# Patient Record
Sex: FEMALE | Race: WHITE | NOT HISPANIC OR LATINO | Employment: FULL TIME | URBAN - NONMETROPOLITAN AREA
[De-identification: names, ages, dates, MRNs, and addresses within clinical notes are randomized per-mention and may not be internally consistent; named-entity substitution may affect disease eponyms.]

---

## 2017-01-04 ENCOUNTER — TELEPHONE (OUTPATIENT)
Dept: OBSTETRICS AND GYNECOLOGY | Facility: CLINIC | Age: 22
End: 2017-01-04

## 2017-01-04 NOTE — TELEPHONE ENCOUNTER
----- Message from MILLY Aponte sent at 12/30/2016 12:47 PM CST -----  Contact: [phone]  I saw her in May of this year and told her to get her TSH checked and we would restart her synthroid if needed until she could get a PCP but she did not have the labs done. She needs to establish a PCP for her thyroid issues.    ----- Message -----     From: Brigid Agudelo     Sent: 12/30/2016  11:17 AM       To: MILLY Aponte    PATIENT IS WANTING TO KNOW IF SHE NEEDS TO COME BACK IN FOR HER THYROID LEVELS...

## 2017-08-15 ENCOUNTER — HOSPITAL ENCOUNTER (EMERGENCY)
Facility: HOSPITAL | Age: 22
Discharge: HOME | End: 2017-08-15
Attending: EMERGENCY MEDICINE | Admitting: EMERGENCY MEDICINE

## 2017-08-15 VITALS
OXYGEN SATURATION: 98 % | WEIGHT: 125 LBS | HEART RATE: 74 BPM | BODY MASS INDEX: 19.62 KG/M2 | SYSTOLIC BLOOD PRESSURE: 109 MMHG | DIASTOLIC BLOOD PRESSURE: 70 MMHG | RESPIRATION RATE: 16 BRPM | TEMPERATURE: 97 F | HEIGHT: 67 IN

## 2017-08-15 DIAGNOSIS — N94.6 MENSTRUAL CRAMPS: Primary | ICD-10-CM

## 2017-08-15 DIAGNOSIS — N94.3 PREMENSTRUAL SYNDROME: ICD-10-CM

## 2017-08-15 LAB
B-HCG UR QL: NEGATIVE
BACTERIA UR QL AUTO: ABNORMAL /HPF
BILIRUB UR QL STRIP: NEGATIVE
CLARITY UR: CLEAR
COLOR UR: YELLOW
GLUCOSE UR STRIP-MCNC: NEGATIVE MG/DL
HGB UR QL STRIP.AUTO: ABNORMAL
HYALINE CASTS UR QL AUTO: ABNORMAL /LPF
KETONES UR QL STRIP: ABNORMAL
LEUKOCYTE ESTERASE UR QL STRIP.AUTO: NEGATIVE
MUCOUS THREADS URNS QL MICRO: ABNORMAL /HPF
NITRITE UR QL STRIP: NEGATIVE
PH UR STRIP.AUTO: 6 [PH] (ref 5–9)
PROT UR QL STRIP: NEGATIVE
RBC # UR: ABNORMAL /HPF
REF LAB TEST METHOD: ABNORMAL
SP GR UR STRIP: 1.02 (ref 1–1.03)
SQUAMOUS #/AREA URNS HPF: ABNORMAL /HPF
UROBILINOGEN UR QL STRIP: ABNORMAL
WBC UR QL AUTO: ABNORMAL /HPF

## 2017-08-15 PROCEDURE — 87086 URINE CULTURE/COLONY COUNT: CPT | Performed by: EMERGENCY MEDICINE

## 2017-08-15 PROCEDURE — 81025 URINE PREGNANCY TEST: CPT | Performed by: EMERGENCY MEDICINE

## 2017-08-15 PROCEDURE — 96372 THER/PROPH/DIAG INJ SC/IM: CPT

## 2017-08-15 PROCEDURE — 99284 EMERGENCY DEPT VISIT MOD MDM: CPT

## 2017-08-15 PROCEDURE — 81001 URINALYSIS AUTO W/SCOPE: CPT | Performed by: EMERGENCY MEDICINE

## 2017-08-15 PROCEDURE — 81003 URINALYSIS AUTO W/O SCOPE: CPT | Performed by: EMERGENCY MEDICINE

## 2017-08-15 PROCEDURE — 25010000002 KETOROLAC TROMETHAMINE PER 15 MG: Performed by: EMERGENCY MEDICINE

## 2017-08-15 RX ORDER — KETOROLAC TROMETHAMINE 15 MG/ML
60 INJECTION, SOLUTION INTRAMUSCULAR; INTRAVENOUS ONCE
Status: DISCONTINUED | OUTPATIENT
Start: 2017-08-15 | End: 2017-08-15

## 2017-08-15 RX ORDER — KETOROLAC TROMETHAMINE 30 MG/ML
60 INJECTION, SOLUTION INTRAMUSCULAR; INTRAVENOUS ONCE
Status: COMPLETED | OUTPATIENT
Start: 2017-08-15 | End: 2017-08-15

## 2017-08-15 RX ADMIN — KETOROLAC TROMETHAMINE 60 MG: 60 INJECTION, SOLUTION INTRAMUSCULAR at 21:45

## 2017-08-16 ENCOUNTER — TELEPHONE (OUTPATIENT)
Dept: FAMILY MEDICINE CLINIC | Facility: CLINIC | Age: 22
End: 2017-08-16

## 2017-08-16 NOTE — ED PROVIDER NOTES
Subjective   HPI Comments: 22 years old female presented in the ER for evaluation of premenstrual and menstrual cramps.  She has been getting them for 2-3 years on a constant basis.  Patient reports she has sensation of bloating/fullness/nausea just before the start of cycle and initial couple of days or very heavy and painful.  They're progressively getting worse.  She is on her cycle for the last 2 days.  She woke up this morning with lower abdominal cramps.  It lasts for 30 minutes to an hour and improve on its own.  She has been taking over-the-counter pain medication with momentarily dropleaf.  She is not seen any provider for her ongoing symptoms.  She denies any nausea at present.  Her pain is better now.  She denies any diarrhea.  She has no fever or chills reported.  Patient reports her pain 2 out of 10.  Her cycles are pretty much regular.    Patient is a 22 y.o. female presenting with cramps.   History provided by:  Patient   used: No    Abdominal Cramping   Pain location: lower abdomen   Pain quality: cramping    Pain radiates to:  Does not radiate  Pain severity:  Moderate  Onset quality:  Gradual  Duration: almost 2-3 years   Timing:  Intermittent  Progression:  Waxing and waning  Chronicity:  Chronic  Context comment:  Premenstrual and menstrual  Relieved by:  OTC medications  Worsened by:  Nothing  Associated symptoms: no anorexia, no chest pain, no chills, no constipation, no cough, no diarrhea, no dysuria, no fever, no hematuria, no nausea, no shortness of breath and no vomiting        Review of Systems   Constitutional: Negative for activity change, chills and fever.   HENT: Negative for congestion, facial swelling, rhinorrhea and sinus pressure.    Eyes: Negative for photophobia and visual disturbance.   Respiratory: Negative for cough, chest tightness, shortness of breath and wheezing.    Cardiovascular: Negative for chest pain.   Gastrointestinal: Negative for abdominal  distention, abdominal pain, anorexia, constipation, diarrhea, nausea and vomiting.   Endocrine: Negative for polyuria.   Genitourinary: Negative for dysuria, flank pain and hematuria.   Musculoskeletal: Negative for back pain, joint swelling and neck pain.   Skin: Negative for rash.   Neurological: Negative for seizures, numbness and headaches.   Hematological: Negative for adenopathy.   Psychiatric/Behavioral: Negative for agitation.       Past Medical History:   Diagnosis Date   • Disease of thyroid gland    • Kidney stone    • Ovarian cyst        No Known Allergies    Past Surgical History:   Procedure Laterality Date   • ADENOIDECTOMY     • KIDNEY STONE SURGERY     • TONSILLECTOMY         History reviewed. No pertinent family history.    Social History     Social History   • Marital status: Single     Spouse name: N/A   • Number of children: N/A   • Years of education: N/A     Social History Main Topics   • Smoking status: Never Smoker   • Smokeless tobacco: None   • Alcohol use No   • Drug use: No   • Sexual activity: No     Other Topics Concern   • None     Social History Narrative   • None           Objective   Physical Exam   Constitutional: She is oriented to person, place, and time. She appears well-developed and well-nourished. No distress.   HENT:   Head: Normocephalic and atraumatic.   Eyes: Conjunctivae are normal.   Neck: Normal range of motion. Neck supple.   Cardiovascular: Normal rate, regular rhythm and normal heart sounds.    Pulmonary/Chest: Effort normal and breath sounds normal. No respiratory distress. She has no wheezes.   Abdominal: Soft. Bowel sounds are normal. There is no tenderness (minimal discomfort in the lsuprapubic area ). There is no rebound and no guarding.   Musculoskeletal: Normal range of motion. She exhibits no edema or deformity.   Neurological: She is alert and oriented to person, place, and time.   Skin: Skin is warm and dry. No rash noted.   Psychiatric: Her mood appears  anxious.   Nursing note and vitals reviewed.      Procedures         ED Course  ED Course   Comment By Time   I believe she has a urinary tract infection.  It safe contaminated somber.  She has clear picture of premenstrual syndrome with some menstrual cramps.  I have advised her to take Tylenol/ibuprofen as needed and follow-up with primary care to be on some medication for daily use. Hima Martinez MD 08/15 2955          .      Labs Reviewed   URINALYSIS W/ CULTURE IF INDICATED - Abnormal; Notable for the following:        Result Value    Ketones, UA Trace (*)     Blood, UA Trace (*)     All other components within normal limits   URINALYSIS, MICROSCOPIC ONLY - Abnormal; Notable for the following:     RBC, UA 0-2 (*)     WBC, UA 6-12 (*)     Bacteria, UA Trace (*)     Squamous Epithelial Cells, UA 6-12 (*)     Mucus, UA Large/3+ (*)     All other components within normal limits   PREGNANCY, URINE - Normal   URINE CULTURE       No results found.        MDM    Final diagnoses:   Menstrual cramps   Premenstrual syndrome            Hima Martinez MD  08/16/17 2906

## 2017-08-16 NOTE — ED TRIAGE NOTES
"Patient presents to ED with c/o lower abdominal pain, reportedly from her menstrual cycle. States her cramping is severe every month during this time but  this occurrence is the worst pain she has experienced thus far with her cycle. Claims to have a hx of ovarian cyst, diagnosed by vaginal ultrasound about 2 years ago. Pt took \"Pamprin\" for her pain @ 0800am this morning around the onset of the pain, with no relief.   "

## 2017-08-16 NOTE — DISCHARGE INSTRUCTIONS
Take Tylenol/Pamprin as needed.  Follow-up with primary care for further evaluation and to be on prophylactic medications.   none

## 2017-08-17 LAB — BACTERIA SPEC AEROBE CULT: NORMAL

## 2017-08-24 ENCOUNTER — OFFICE VISIT (OUTPATIENT)
Dept: FAMILY MEDICINE CLINIC | Facility: CLINIC | Age: 22
End: 2017-08-24

## 2017-08-24 ENCOUNTER — APPOINTMENT (OUTPATIENT)
Dept: LAB | Facility: HOSPITAL | Age: 22
End: 2017-08-24

## 2017-08-24 VITALS
WEIGHT: 132 LBS | SYSTOLIC BLOOD PRESSURE: 90 MMHG | DIASTOLIC BLOOD PRESSURE: 68 MMHG | BODY MASS INDEX: 20.72 KG/M2 | HEIGHT: 67 IN

## 2017-08-24 DIAGNOSIS — E07.89 THYROID FULLNESS: Primary | ICD-10-CM

## 2017-08-24 DIAGNOSIS — E03.9 ACQUIRED HYPOTHYROIDISM: ICD-10-CM

## 2017-08-24 DIAGNOSIS — R10.2 PELVIC PAIN: ICD-10-CM

## 2017-08-24 DIAGNOSIS — Z00.00 GENERAL MEDICAL EXAM: ICD-10-CM

## 2017-08-24 LAB
25(OH)D3 SERPL-MCNC: 32 NG/ML (ref 30–100)
ALBUMIN SERPL-MCNC: 4.6 G/DL (ref 3.4–4.8)
ALBUMIN/GLOB SERPL: 1.3 G/DL (ref 1.1–1.8)
ALP SERPL-CCNC: 81 U/L (ref 38–126)
ALT SERPL W P-5'-P-CCNC: 35 U/L (ref 9–52)
ANION GAP SERPL CALCULATED.3IONS-SCNC: 12 MMOL/L (ref 5–15)
AST SERPL-CCNC: 41 U/L (ref 14–36)
BASOPHILS # BLD AUTO: 0.01 10*3/MM3 (ref 0–0.2)
BASOPHILS NFR BLD AUTO: 0.1 % (ref 0–2)
BILIRUB SERPL-MCNC: 0.8 MG/DL (ref 0.2–1.3)
BUN BLD-MCNC: 10 MG/DL (ref 7–21)
BUN/CREAT SERPL: 10.2 (ref 7–25)
CALCIUM SPEC-SCNC: 9.9 MG/DL (ref 8.4–10.2)
CHLORIDE SERPL-SCNC: 101 MMOL/L (ref 95–110)
CO2 SERPL-SCNC: 25 MMOL/L (ref 22–31)
CREAT BLD-MCNC: 0.98 MG/DL (ref 0.5–1)
DEPRECATED RDW RBC AUTO: 40.4 FL (ref 36.4–46.3)
EOSINOPHIL # BLD AUTO: 0.23 10*3/MM3 (ref 0–0.7)
EOSINOPHIL NFR BLD AUTO: 3.3 % (ref 0–7)
ERYTHROCYTE [DISTWIDTH] IN BLOOD BY AUTOMATED COUNT: 13.4 % (ref 11.5–14.5)
GFR SERPL CREATININE-BSD FRML MDRD: 70 ML/MIN/1.73 (ref 60–165)
GLOBULIN UR ELPH-MCNC: 3.5 GM/DL (ref 2.3–3.5)
GLUCOSE BLD-MCNC: 89 MG/DL (ref 60–100)
HCT VFR BLD AUTO: 41.6 % (ref 35–45)
HGB BLD-MCNC: 14.2 G/DL (ref 12–15.5)
IMM GRANULOCYTES # BLD: 0.02 10*3/MM3 (ref 0–0.02)
IMM GRANULOCYTES NFR BLD: 0.3 % (ref 0–0.5)
IRON 24H UR-MRATE: 67 MCG/DL (ref 37–170)
LYMPHOCYTES # BLD AUTO: 1.27 10*3/MM3 (ref 0.6–4.2)
LYMPHOCYTES NFR BLD AUTO: 18 % (ref 10–50)
MCH RBC QN AUTO: 28 PG (ref 26.5–34)
MCHC RBC AUTO-ENTMCNC: 34.1 G/DL (ref 31.4–36)
MCV RBC AUTO: 81.9 FL (ref 80–98)
MONOCYTES # BLD AUTO: 0.42 10*3/MM3 (ref 0–0.9)
MONOCYTES NFR BLD AUTO: 5.9 % (ref 0–12)
NEUTROPHILS # BLD AUTO: 5.12 10*3/MM3 (ref 2–8.6)
NEUTROPHILS NFR BLD AUTO: 72.4 % (ref 37–80)
PLATELET # BLD AUTO: 216 10*3/MM3 (ref 150–450)
PMV BLD AUTO: 9.7 FL (ref 8–12)
POTASSIUM BLD-SCNC: 4.5 MMOL/L (ref 3.5–5.1)
PROT SERPL-MCNC: 8.1 G/DL (ref 6.3–8.6)
RBC # BLD AUTO: 5.08 10*6/MM3 (ref 3.77–5.16)
SODIUM BLD-SCNC: 138 MMOL/L (ref 137–145)
T3 SERPL-MCNC: 130 NG/DL (ref 97–169)
T4 SERPL-MCNC: 8.46 MCG/DL (ref 5.5–11)
TSH SERPL DL<=0.05 MIU/L-ACNC: 5.42 MIU/ML (ref 0.46–4.68)
VIT B12 BLD-MCNC: 605 PG/ML (ref 239–931)
WBC NRBC COR # BLD: 7.07 10*3/MM3 (ref 3.2–9.8)

## 2017-08-24 PROCEDURE — 82607 VITAMIN B-12: CPT | Performed by: NURSE PRACTITIONER

## 2017-08-24 PROCEDURE — 85025 COMPLETE CBC W/AUTO DIFF WBC: CPT | Performed by: NURSE PRACTITIONER

## 2017-08-24 PROCEDURE — 99214 OFFICE O/P EST MOD 30 MIN: CPT | Performed by: NURSE PRACTITIONER

## 2017-08-24 PROCEDURE — 82306 VITAMIN D 25 HYDROXY: CPT | Performed by: NURSE PRACTITIONER

## 2017-08-24 PROCEDURE — 84480 ASSAY TRIIODOTHYRONINE (T3): CPT | Performed by: NURSE PRACTITIONER

## 2017-08-24 PROCEDURE — 84443 ASSAY THYROID STIM HORMONE: CPT | Performed by: NURSE PRACTITIONER

## 2017-08-24 PROCEDURE — 80053 COMPREHEN METABOLIC PANEL: CPT | Performed by: NURSE PRACTITIONER

## 2017-08-24 PROCEDURE — 86376 MICROSOMAL ANTIBODY EACH: CPT | Performed by: NURSE PRACTITIONER

## 2017-08-24 PROCEDURE — 83540 ASSAY OF IRON: CPT | Performed by: NURSE PRACTITIONER

## 2017-08-24 PROCEDURE — 84436 ASSAY OF TOTAL THYROXINE: CPT | Performed by: NURSE PRACTITIONER

## 2017-08-24 PROCEDURE — 36415 COLL VENOUS BLD VENIPUNCTURE: CPT | Performed by: NURSE PRACTITIONER

## 2017-08-24 NOTE — PROGRESS NOTES
Chief Complaint   Patient presents with   • Establish Care   • Menstrual Problem     bad cramping   • Hypothyroidism     Subjective   Lala Padilla is a 22 y.o. female.     Menstrual Problem   This is a chronic problem. The current episode started more than 1 year ago. The problem occurs constantly. The problem has been gradually worsening. Associated symptoms include abdominal pain, arthralgias, congestion, fatigue and a sore throat. Pertinent negatives include no anorexia, change in bowel habit, chest pain, chills, coughing, diaphoresis, fever, headaches, joint swelling, myalgias, nausea, neck pain, numbness, rash, swollen glands, urinary symptoms, vertigo, visual change, vomiting or weakness. Exacerbated by: menstrual cycle  Treatments tried: 2 different ocp-did not help has been to the er several times with no relief  The treatment provided no relief.   Hypothyroidism   This is a recurrent problem. The current episode started 1 to 4 weeks ago. The problem occurs constantly. The problem has been gradually worsening. Associated symptoms include abdominal pain, arthralgias, congestion, fatigue and a sore throat. Pertinent negatives include no anorexia, change in bowel habit, chest pain, chills, coughing, diaphoresis, fever, headaches, joint swelling, myalgias, nausea, neck pain, numbness, rash, swollen glands, urinary symptoms, vertigo, visual change, vomiting or weakness. Nothing aggravates the symptoms. She has tried nothing for the symptoms.        The following portions of the patient's history were reviewed and updated as appropriate: allergies, current medications, past social history and problem list.    Review of Systems   Constitutional: Positive for activity change, appetite change and fatigue. Negative for chills, diaphoresis and fever.   HENT: Positive for congestion, postnasal drip and sore throat.    Eyes: Negative.  Negative for photophobia, pain, discharge, redness, itching and visual  "disturbance.   Respiratory: Negative.  Negative for apnea, cough, choking, chest tightness, shortness of breath, wheezing and stridor.    Cardiovascular: Negative.  Negative for chest pain.   Gastrointestinal: Positive for abdominal pain. Negative for anorexia, change in bowel habit, nausea and vomiting.   Endocrine: Negative.    Genitourinary: Positive for difficulty urinating, menstrual problem, pelvic pain and vaginal pain. Negative for decreased urine volume, dyspareunia, dysuria, enuresis, flank pain, frequency, genital sores, hematuria, urgency, vaginal bleeding and vaginal discharge.   Musculoskeletal: Positive for arthralgias. Negative for back pain, gait problem, joint swelling, myalgias and neck pain.   Skin: Negative for rash.   Allergic/Immunologic: Negative.  Negative for environmental allergies, food allergies and immunocompromised state.   Neurological: Negative.  Negative for dizziness, vertigo, tremors, seizures, syncope, facial asymmetry, speech difficulty, weakness, light-headedness, numbness and headaches.   Hematological: Negative.  Negative for adenopathy. Does not bruise/bleed easily.   Psychiatric/Behavioral: Negative.    All other systems reviewed and are negative.      Objective   BP 90/68  Ht 67\" (170.2 cm)  Wt 132 lb (59.9 kg)  BMI 20.67 kg/m2  Physical Exam   Constitutional: She is oriented to person, place, and time. She appears well-developed and well-nourished. No distress.   HENT:   Head: Normocephalic and atraumatic.   Mouth/Throat: No oropharyngeal exudate.   Thyroid fullness-right worse than left-non tender mother recently treated for thyroid cancer    Eyes: EOM are normal. Pupils are equal, round, and reactive to light. Right eye exhibits no discharge. Left eye exhibits no discharge. No scleral icterus.   Neck: Normal range of motion. No JVD present. No tracheal deviation present. No thyromegaly present.   Cardiovascular: Normal rate.  Exam reveals no gallop and no friction " rub.    No murmur heard.  Pulmonary/Chest: Effort normal and breath sounds normal. No stridor. No respiratory distress. She has no wheezes. She has no rales. She exhibits no tenderness.   Abdominal: Soft. Bowel sounds are normal. She exhibits no distension and no mass. There is tenderness. There is no rebound and no guarding. No hernia.   Musculoskeletal: Normal range of motion. She exhibits no edema, tenderness or deformity.   Lymphadenopathy:     She has no cervical adenopathy.   Neurological: She is alert and oriented to person, place, and time. She has normal reflexes. She displays normal reflexes. No cranial nerve deficit. She exhibits normal muscle tone. Coordination normal.   Skin: Skin is warm and dry. She is not diaphoretic.   Nursing note and vitals reviewed.      Assessment/Plan   Problem List Items Addressed This Visit        Endocrine    Thyroid fullness - Primary    Relevant Orders    TSH    T3    T4    Comprehensive Metabolic Panel    CBC & Differential    Iron    Vitamin B12    Vitamin D 25 Hydroxy    Thyroid Peroxidase Antibody    US Thyroid    CBC Auto Differential    Acquired hypothyroidism    Relevant Orders    TSH    T3    T4    Comprehensive Metabolic Panel    CBC & Differential    Iron    Vitamin B12    Vitamin D 25 Hydroxy    Thyroid Peroxidase Antibody    US Thyroid    CBC Auto Differential       Nervous and Auditory    Pelvic pain    Relevant Orders    Ambulatory Referral to Gynecology       Other    General medical exam    Relevant Orders    TSH    T3    T4    Comprehensive Metabolic Panel    CBC & Differential    Iron    Vitamin B12    Vitamin D 25 Hydroxy    Thyroid Peroxidase Antibody    US Thyroid    CBC Auto Differential         No orders of the defined types were placed in this encounter.    Follow up with gyn as scheduled due to heavy irregular menstrual cycles, us of thyroid due to mother's family hx and swelling of thyroid noted today

## 2017-08-25 DIAGNOSIS — E03.9 ACQUIRED HYPOTHYROIDISM: Primary | ICD-10-CM

## 2017-08-26 LAB — THYROPEROXIDASE AB SERPL-ACNC: 194 IU/ML (ref 0–34)

## 2017-08-28 DIAGNOSIS — E03.9 ACQUIRED HYPOTHYROIDISM: Primary | ICD-10-CM

## 2017-08-28 RX ORDER — LEVOTHYROXINE SODIUM 0.05 MG/1
50 TABLET ORAL DAILY
Qty: 30 TABLET | Refills: 11 | Status: SHIPPED | OUTPATIENT
Start: 2017-08-28 | End: 2018-03-02 | Stop reason: DRUGHIGH

## 2017-09-28 ENCOUNTER — OFFICE VISIT (OUTPATIENT)
Dept: OBSTETRICS AND GYNECOLOGY | Facility: CLINIC | Age: 22
End: 2017-09-28

## 2017-09-28 VITALS
WEIGHT: 132 LBS | DIASTOLIC BLOOD PRESSURE: 60 MMHG | SYSTOLIC BLOOD PRESSURE: 98 MMHG | HEIGHT: 67 IN | BODY MASS INDEX: 20.72 KG/M2

## 2017-09-28 DIAGNOSIS — Z01.419 ENCOUNTER FOR ANNUAL ROUTINE GYNECOLOGICAL EXAMINATION: Primary | ICD-10-CM

## 2017-09-28 DIAGNOSIS — N94.6 DYSMENORRHEA: Chronic | ICD-10-CM

## 2017-09-28 DIAGNOSIS — R10.2 PELVIC PAIN: ICD-10-CM

## 2017-09-28 PROCEDURE — 99385 PREV VISIT NEW AGE 18-39: CPT | Performed by: OBSTETRICS & GYNECOLOGY

## 2017-09-28 PROCEDURE — 88142 CYTOPATH C/V THIN LAYER: CPT | Performed by: OBSTETRICS & GYNECOLOGY

## 2017-09-29 NOTE — PROGRESS NOTES
Lala Padilla is a 22 y.o. y/o female     Chief Complaint: Annual GYN exam and Pap smear and pelvic pain and dysmenorrhea    HPI: 22-year-old G0 who states that she is not sexually active presents for annual GYN exam and Pap smear.  After exam and Pap smear she stated that her primary care provider sent her to me because she may have endometriosis.    We discussed endometriosis and how it is definitively diagnosed and some of the treatment options for it.  I suggested that she try a low-dose birth control pill, but she wants to think about that.    She is a nonsmoker, single, and unemployed.  She has hypothyroidism for which she takes 50 µg of Synthroid a day.  She states that her mother had thyroid cancer. Past surgical history include tonsillectomy in 2008 and surgery for kidney stones in 2014.    Review of Systems   Constitutional: Positive for fatigue. Negative for activity change, appetite change, chills, diaphoresis, fever and unexpected weight change.   Gastrointestinal: Positive for diarrhea. Negative for abdominal pain, constipation and nausea.   Genitourinary: Positive for menstrual problem and pelvic pain. Negative for difficulty urinating, dyspareunia, dysuria, urgency, vaginal bleeding, vaginal discharge and vaginal pain.   Neurological: Positive for headaches.   Psychiatric/Behavioral: Positive for dysphoric mood and sleep disturbance. The patient is nervous/anxious.    All other systems reviewed and are negative.     Breast ROS: negative    The following portions of the patient's history were reviewed and updated as appropriate: allergies, current medications, past family history, past medical history, past social history, past surgical history and problem list.    No Known Allergies       Current Outpatient Prescriptions:   •  levothyroxine (SYNTHROID) 50 MCG tablet, Take 1 tablet by mouth Daily., Disp: 30 tablet, Rfl: 11  •  Naproxen Sodium (PAMPRIN ALL DAY RELIEF MAX ST PO), Take  by  mouth., Disp: , Rfl:      The patient has a family history of   Family History   Problem Relation Age of Onset   • Cancer Other    • Diabetes Other    • Heart disease Other    • Hypertension Other    • Thyroid disease Other         Past Medical History:   Diagnosis Date   • Acute tonsillitis    • Bronchitis    • Cellulitis     History of abscess, near left axilla      • Contusion of knee    • Depressive disorder    • Diarrhea    • Disease of thyroid gland    • Dysmenorrhea 9/28/2017   • Dysuria    • Encounter for gynecological examination (general) (routine) without abnormal findings    • Encounter for removal of sutures    • Encounter for surveillance of contraceptive pills    • Folliculitis    • Fracture of phalanx of hand     Left thumb      • Gastroenteritis    • Generalized anxiety disorder    • Hypothyroidism, unspecified    • Irregular periods    • Kidney stone    • Knee pain    • Lipoma    • Ovarian cyst    • Pain in joint, ankle and foot    • Pain in wrist    • Panic disorder    • Ruptured cyst of ovary    • Sports physical    • Sprain of ankle    • Strain of neck muscle    • Tick bite    • Upper respiratory infection         OB History     No data available           Social History     Social History   • Marital status: Single     Spouse name: N/A   • Number of children: N/A   • Years of education: N/A     Occupational History   • Not on file.     Social History Main Topics   • Smoking status: Never Smoker   • Smokeless tobacco: Never Used   • Alcohol use No   • Drug use: No   • Sexual activity: Not Currently     Birth control/ protection: None     Other Topics Concern   • Not on file     Social History Narrative        Past Surgical History:   Procedure Laterality Date   • ADENOIDECTOMY     • HYSTERECTOMY     • INCISION AND DRAINAGE ABSCESS  02/04/2009    Left axillary abscess   • INJECTION OF MEDICATION  05/31/2014    Toradol(1)   • INJECTION OF MEDICATION  05/31/2014    Zofran(1)   • KIDNEY STONE  "SURGERY     • TONSILLECTOMY          Patient Active Problem List   Diagnosis   • Thyroid fullness   • General medical exam   • Acquired hypothyroidism   • Pelvic pain   • Dysmenorrhea        Documented Vitals    09/28/17 1440   BP: 98/60   Weight: 132 lb (59.9 kg)   Height: 67\" (170.2 cm)   PainSc: 0-No pain       Physical Exam   Constitutional: She is oriented to person, place, and time. She appears well-developed and well-nourished. No distress.   132 pounds with BMI 20.7   HENT:   Head: Normocephalic and atraumatic.   Eyes: Conjunctivae and EOM are normal. Pupils are equal, round, and reactive to light.   Neck: Normal range of motion. Neck supple. No JVD present. No tracheal deviation present. No thyromegaly present.   Cardiovascular: Normal rate, regular rhythm, normal heart sounds and intact distal pulses.  Exam reveals no gallop and no friction rub.    No murmur heard.  Pulmonary/Chest: Effort normal and breath sounds normal. No stridor. No respiratory distress. She has no wheezes. She has no rales. She exhibits no tenderness. Right breast exhibits no inverted nipple, no mass, no nipple discharge, no skin change and no tenderness. Left breast exhibits no inverted nipple, no mass, no nipple discharge, no skin change and no tenderness. Breasts are symmetrical. There is no breast swelling.   Abdominal: Soft. Bowel sounds are normal. She exhibits no distension and no mass. There is no tenderness. There is no rebound and no guarding. No hernia. Hernia confirmed negative in the right inguinal area and confirmed negative in the left inguinal area.   Genitourinary: Vagina normal and uterus normal. Rectal exam shows no external hemorrhoid. No breast tenderness, discharge or bleeding. No labial fusion. There is no rash, tenderness, lesion or injury on the right labia. There is no rash, tenderness, lesion or injury on the left labia. Uterus is not deviated, not enlarged, not fixed and not tender. Cervix exhibits no " motion tenderness, no discharge and no friability. Right adnexum displays no mass, no tenderness and no fullness. Left adnexum displays no mass, no tenderness and no fullness. No vaginal discharge found.   Genitourinary Comments: Pap smear of the cervix performed.  Urethral meatus without erythema or prolapse.   Musculoskeletal: Normal range of motion. She exhibits no edema, tenderness or deformity.   Lymphadenopathy:     She has no cervical adenopathy.        Right: No inguinal adenopathy present.        Left: No inguinal adenopathy present.   Neurological: She is alert and oriented to person, place, and time. She has normal reflexes. She displays normal reflexes. No cranial nerve deficit. She exhibits normal muscle tone. Coordination normal.   Skin: Skin is warm and dry. No rash noted. She is not diaphoretic. No erythema. No pallor.   Psychiatric: She has a normal mood and affect. Her behavior is normal. Judgment and thought content normal.   Nursing note and vitals reviewed.       Assessment        Diagnosis Plan   1. Encounter for annual routine gynecological examination  Liquid-based Pap Smear, Screening - ThinPrep Vial, Cervix   2. Pelvic pain     3. Dysmenorrhea           Plan      1. Consider birth control pills.    2. Encouraged in diet and exercise.  3. Handouts on depression, hot flashes, exercise, and vitamin use.   4. Follow-up in 3 months.  Follow-up sooner as needed.            This document has been electronically signed by Polo Keenan MD on September 28, 2017 8:54 PM

## 2017-10-02 LAB
LAB AP CASE REPORT: NORMAL
LAB AP GYN ADDITIONAL INFORMATION: NORMAL
Lab: NORMAL
PATH INTERP SPEC-IMP: NORMAL
STAT OF ADQ CVX/VAG CYTO-IMP: NORMAL

## 2017-11-15 DIAGNOSIS — E03.9 ACQUIRED HYPOTHYROIDISM: Primary | ICD-10-CM

## 2017-11-17 ENCOUNTER — APPOINTMENT (OUTPATIENT)
Dept: LAB | Facility: HOSPITAL | Age: 22
End: 2017-11-17

## 2017-11-17 LAB
T3 SERPL-MCNC: 132 NG/DL (ref 97–169)
T4 FREE SERPL-MCNC: 1.02 NG/DL (ref 0.78–2.19)
TSH SERPL DL<=0.05 MIU/L-ACNC: 2.02 MIU/ML (ref 0.46–4.68)

## 2017-11-17 PROCEDURE — 36415 COLL VENOUS BLD VENIPUNCTURE: CPT | Performed by: NURSE PRACTITIONER

## 2017-11-17 PROCEDURE — 84480 ASSAY TRIIODOTHYRONINE (T3): CPT | Performed by: NURSE PRACTITIONER

## 2017-11-17 PROCEDURE — 84439 ASSAY OF FREE THYROXINE: CPT | Performed by: NURSE PRACTITIONER

## 2017-11-17 PROCEDURE — 84443 ASSAY THYROID STIM HORMONE: CPT | Performed by: NURSE PRACTITIONER

## 2017-12-28 ENCOUNTER — OFFICE VISIT (OUTPATIENT)
Dept: OBSTETRICS AND GYNECOLOGY | Facility: CLINIC | Age: 22
End: 2017-12-28

## 2017-12-28 VITALS
BODY MASS INDEX: 21.6 KG/M2 | HEIGHT: 67 IN | WEIGHT: 137.6 LBS | DIASTOLIC BLOOD PRESSURE: 70 MMHG | SYSTOLIC BLOOD PRESSURE: 99 MMHG

## 2017-12-28 DIAGNOSIS — N94.6 DYSMENORRHEA: Primary | Chronic | ICD-10-CM

## 2017-12-28 DIAGNOSIS — E03.9 ACQUIRED HYPOTHYROIDISM: ICD-10-CM

## 2017-12-28 PROCEDURE — 99214 OFFICE O/P EST MOD 30 MIN: CPT | Performed by: OBSTETRICS & GYNECOLOGY

## 2017-12-28 RX ORDER — NORETHINDRONE ACETATE AND ETHINYL ESTRADIOL 1; .02 MG/1; MG/1
1 TABLET ORAL DAILY
Qty: 21 TABLET | Refills: 12 | Status: SHIPPED | OUTPATIENT
Start: 2017-12-28 | End: 2018-02-08 | Stop reason: SDUPTHER

## 2018-01-26 ENCOUNTER — OFFICE VISIT (OUTPATIENT)
Dept: FAMILY MEDICINE CLINIC | Facility: CLINIC | Age: 23
End: 2018-01-26

## 2018-01-26 VITALS
HEIGHT: 67 IN | TEMPERATURE: 98.2 F | DIASTOLIC BLOOD PRESSURE: 64 MMHG | WEIGHT: 138.9 LBS | SYSTOLIC BLOOD PRESSURE: 110 MMHG | BODY MASS INDEX: 21.8 KG/M2 | OXYGEN SATURATION: 98 % | HEART RATE: 109 BPM

## 2018-01-26 DIAGNOSIS — J01.00 ACUTE NON-RECURRENT MAXILLARY SINUSITIS: Primary | ICD-10-CM

## 2018-01-26 PROCEDURE — 99213 OFFICE O/P EST LOW 20 MIN: CPT | Performed by: NURSE PRACTITIONER

## 2018-01-26 RX ORDER — FLUTICASONE PROPIONATE 50 MCG
2 SPRAY, SUSPENSION (ML) NASAL DAILY
Qty: 1 BOTTLE | Refills: 0 | Status: SHIPPED | OUTPATIENT
Start: 2018-01-26 | End: 2018-03-01

## 2018-01-26 RX ORDER — AMOXICILLIN AND CLAVULANATE POTASSIUM 875; 125 MG/1; MG/1
1 TABLET, FILM COATED ORAL EVERY 12 HOURS SCHEDULED
Qty: 20 TABLET | Refills: 0 | Status: SHIPPED | OUTPATIENT
Start: 2018-01-26 | End: 2018-03-01

## 2018-01-26 NOTE — PROGRESS NOTES
Melissa Padilla is a 23 y.o. female.  Has had sinus issues for years but two days ago she began having blood in her drainage from her nose and the back of her throat.  Yesterday she was brushing her teeth and she kind of gagged and a lot of blood came up in her mucous.  Has been having a lot of pressure on her forehead and around her eyes.    Sinus Problem   This is a new problem. The current episode started in the past 7 days. The problem has been gradually worsening since onset. There has been no fever. Associated symptoms include coughing and sinus pressure. Past treatments include nothing. The treatment provided no relief.        The following portions of the patient's history were reviewed and updated as appropriate: allergies, current medications, past family history, past medical history, past social history, past surgical history and problem list.    Review of Systems   Constitutional: Negative.    HENT: Positive for sinus pressure.    Eyes: Negative.    Respiratory: Positive for cough.    Gastrointestinal: Negative.    Genitourinary: Negative.    Musculoskeletal: Negative.    Skin: Negative.        Objective   Physical Exam   Constitutional: She is oriented to person, place, and time. She appears well-developed and well-nourished.   HENT:   Head: Normocephalic.   Right Ear: External ear normal. Tympanic membrane is bulging.   Left Ear: External ear normal. Tympanic membrane is bulging.   Nose: Mucosal edema present. Right sinus exhibits maxillary sinus tenderness. Left sinus exhibits maxillary sinus tenderness.   Eyes: Pupils are equal, round, and reactive to light.   Dark circles to bilateral orbital areas   Neck: Normal range of motion. Neck supple.   Cardiovascular: Normal rate, regular rhythm and normal heart sounds.    Pulmonary/Chest: Effort normal and breath sounds normal.   Abdominal: Soft. Bowel sounds are normal.   Musculoskeletal: Normal range of motion.   Neurological:  She is alert and oriented to person, place, and time.   Skin: Skin is warm and dry.   Psychiatric: She has a normal mood and affect. Her behavior is normal. Judgment and thought content normal.   Nursing note and vitals reviewed.      Assessment/Plan   Problems Addressed this Visit     None      Visit Diagnoses     Acute non-recurrent maxillary sinusitis    -  Primary    Relevant Medications    amoxicillin-clavulanate (AUGMENTIN) 875-125 MG per tablet    fluticasone (FLONASE) 50 MCG/ACT nasal spray        Continue on current medications as previously prescribed  Schedule follow-up appointment with primary care provider or may establish primary care with this office    1.  Acute nonrecurrent maxillary sinusitis:  Begin prescription for Augmentin as prescribed to be taken 1 by mouth twice a day ×10 days  Educated patient on importance of completing full dose of antibiotic therapy again Flonase as prescribed to be one spray to each nostril twice a day  Schedule follow-up appointment with this office in 3-5 days if no improvement or worsening of symptoms        This document has been electronically signed by MILLY Desai on January 29, 2018 12:51 PM

## 2018-02-08 RX ORDER — NORETHINDRONE ACETATE AND ETHINYL ESTRADIOL 1; .02 MG/1; MG/1
1 TABLET ORAL DAILY
Qty: 21 TABLET | Refills: 12 | Status: SHIPPED | OUTPATIENT
Start: 2018-02-08 | End: 2019-02-05 | Stop reason: SDUPTHER

## 2018-03-01 ENCOUNTER — OFFICE VISIT (OUTPATIENT)
Dept: FAMILY MEDICINE CLINIC | Facility: CLINIC | Age: 23
End: 2018-03-01

## 2018-03-01 ENCOUNTER — APPOINTMENT (OUTPATIENT)
Dept: LAB | Facility: HOSPITAL | Age: 23
End: 2018-03-01

## 2018-03-01 VITALS
HEIGHT: 67 IN | WEIGHT: 141 LBS | DIASTOLIC BLOOD PRESSURE: 78 MMHG | BODY MASS INDEX: 22.13 KG/M2 | SYSTOLIC BLOOD PRESSURE: 118 MMHG

## 2018-03-01 DIAGNOSIS — E03.9 ACQUIRED HYPOTHYROIDISM: ICD-10-CM

## 2018-03-01 DIAGNOSIS — F41.9 ANXIETY: Primary | ICD-10-CM

## 2018-03-01 DIAGNOSIS — R53.81 MALAISE: ICD-10-CM

## 2018-03-01 LAB
25(OH)D3 SERPL-MCNC: <12.8 NG/ML (ref 30–100)
ALBUMIN SERPL-MCNC: 4.4 G/DL (ref 3.4–4.8)
ALBUMIN/GLOB SERPL: 1.3 G/DL (ref 1.1–1.8)
ALP SERPL-CCNC: 75 U/L (ref 38–126)
ALT SERPL W P-5'-P-CCNC: 28 U/L (ref 9–52)
ANION GAP SERPL CALCULATED.3IONS-SCNC: 13 MMOL/L (ref 5–15)
AST SERPL-CCNC: 29 U/L (ref 14–36)
BASOPHILS # BLD AUTO: 0.02 10*3/MM3 (ref 0–0.2)
BASOPHILS NFR BLD AUTO: 0.2 % (ref 0–2)
BILIRUB SERPL-MCNC: 0.4 MG/DL (ref 0.2–1.3)
BUN BLD-MCNC: 9 MG/DL (ref 7–21)
BUN/CREAT SERPL: 9 (ref 7–25)
CALCIUM SPEC-SCNC: 9.6 MG/DL (ref 8.4–10.2)
CHLORIDE SERPL-SCNC: 101 MMOL/L (ref 95–110)
CO2 SERPL-SCNC: 23 MMOL/L (ref 22–31)
CREAT BLD-MCNC: 1 MG/DL (ref 0.5–1)
DEPRECATED RDW RBC AUTO: 37.5 FL (ref 36.4–46.3)
EOSINOPHIL # BLD AUTO: 0.21 10*3/MM3 (ref 0–0.7)
EOSINOPHIL NFR BLD AUTO: 1.9 % (ref 0–7)
ERYTHROCYTE [DISTWIDTH] IN BLOOD BY AUTOMATED COUNT: 13 % (ref 11.5–14.5)
GFR SERPL CREATININE-BSD FRML MDRD: 68 ML/MIN/1.73 (ref 71–165)
GLOBULIN UR ELPH-MCNC: 3.5 GM/DL (ref 2.3–3.5)
GLUCOSE BLD-MCNC: 85 MG/DL (ref 60–100)
HCT VFR BLD AUTO: 40 % (ref 35–45)
HGB BLD-MCNC: 13.8 G/DL (ref 12–15.5)
IMM GRANULOCYTES # BLD: 0.02 10*3/MM3 (ref 0–0.02)
IMM GRANULOCYTES NFR BLD: 0.2 % (ref 0–0.5)
IRON 24H UR-MRATE: 97 MCG/DL (ref 37–170)
LYMPHOCYTES # BLD AUTO: 1.32 10*3/MM3 (ref 0.6–4.2)
LYMPHOCYTES NFR BLD AUTO: 12.1 % (ref 10–50)
MAGNESIUM SERPL-MCNC: 1.8 MG/DL (ref 1.6–2.3)
MCH RBC QN AUTO: 27.8 PG (ref 26.5–34)
MCHC RBC AUTO-ENTMCNC: 34.5 G/DL (ref 31.4–36)
MCV RBC AUTO: 80.5 FL (ref 80–98)
MONOCYTES # BLD AUTO: 0.44 10*3/MM3 (ref 0–0.9)
MONOCYTES NFR BLD AUTO: 4 % (ref 0–12)
NEUTROPHILS # BLD AUTO: 8.92 10*3/MM3 (ref 2–8.6)
NEUTROPHILS NFR BLD AUTO: 81.6 % (ref 37–80)
PLATELET # BLD AUTO: 283 10*3/MM3 (ref 150–450)
PMV BLD AUTO: 9.3 FL (ref 8–12)
POTASSIUM BLD-SCNC: 4.1 MMOL/L (ref 3.5–5.1)
PROT SERPL-MCNC: 7.9 G/DL (ref 6.3–8.6)
RBC # BLD AUTO: 4.97 10*6/MM3 (ref 3.77–5.16)
SODIUM BLD-SCNC: 137 MMOL/L (ref 137–145)
TSH SERPL DL<=0.05 MIU/L-ACNC: 7.97 MIU/ML (ref 0.46–4.68)
VIT B12 BLD-MCNC: 293 PG/ML (ref 239–931)
WBC NRBC COR # BLD: 10.93 10*3/MM3 (ref 3.2–9.8)

## 2018-03-01 PROCEDURE — 99213 OFFICE O/P EST LOW 20 MIN: CPT | Performed by: NURSE PRACTITIONER

## 2018-03-01 PROCEDURE — 80053 COMPREHEN METABOLIC PANEL: CPT | Performed by: NURSE PRACTITIONER

## 2018-03-01 PROCEDURE — 83540 ASSAY OF IRON: CPT | Performed by: NURSE PRACTITIONER

## 2018-03-01 PROCEDURE — 83735 ASSAY OF MAGNESIUM: CPT | Performed by: NURSE PRACTITIONER

## 2018-03-01 PROCEDURE — 82607 VITAMIN B-12: CPT | Performed by: NURSE PRACTITIONER

## 2018-03-01 PROCEDURE — 84443 ASSAY THYROID STIM HORMONE: CPT | Performed by: NURSE PRACTITIONER

## 2018-03-01 PROCEDURE — 82306 VITAMIN D 25 HYDROXY: CPT | Performed by: NURSE PRACTITIONER

## 2018-03-01 PROCEDURE — 85025 COMPLETE CBC W/AUTO DIFF WBC: CPT | Performed by: NURSE PRACTITIONER

## 2018-03-01 PROCEDURE — 36415 COLL VENOUS BLD VENIPUNCTURE: CPT | Performed by: NURSE PRACTITIONER

## 2018-03-01 RX ORDER — BUSPIRONE HYDROCHLORIDE 10 MG/1
TABLET ORAL
Qty: 60 TABLET | Refills: 5 | Status: SHIPPED | OUTPATIENT
Start: 2018-03-01 | End: 2018-06-21

## 2018-03-01 RX ORDER — HYDROXYZINE PAMOATE 25 MG/1
CAPSULE ORAL
Qty: 60 CAPSULE | Refills: 5 | Status: SHIPPED | OUTPATIENT
Start: 2018-03-01 | End: 2018-03-29

## 2018-03-01 RX ORDER — ESCITALOPRAM OXALATE 10 MG/1
10 TABLET ORAL DAILY
Qty: 30 TABLET | Refills: 11 | Status: SHIPPED | OUTPATIENT
Start: 2018-03-01 | End: 2018-12-07

## 2018-03-01 RX ORDER — CEFPROZIL 500 MG/1
500 TABLET, FILM COATED ORAL 2 TIMES DAILY
Qty: 20 TABLET | Refills: 0 | Status: SHIPPED | OUTPATIENT
Start: 2018-03-01 | End: 2018-03-29

## 2018-03-01 NOTE — PROGRESS NOTES
Chief Complaint   Patient presents with   • Depression   • Anxiety     trouble sleeping   • sinus issues     Subjective   Lala Padilla is a 23 y.o. female.     Depression   Visit Type: initial  Onset of symptoms: 1 to 6 months ago  Progression since onset: gradually worsening  Patient presents with the following symptoms: decreased concentration, depressed mood, excessive worry, feelings of worthlessness and nervousness/anxiety.  Patient is not experiencing: anhedonia, chest pain, choking sensation, compulsions, confusion, dizziness, malaise, memory impairment, muscle tension, nausea, obsessions, palpitations, panic, psychomotor agitation, psychomotor retardation, restlessness, shortness of breath and suicidal ideas.  Severity: severe   Sleep quality: fair  Nighttime awakenings: none  Patient has a history of: anxiety/panic attacks and depression  No history of: anemia, arrhythmia, asthma, bipolar disorder, CAD, CHF, chronic lung disease, fibromyalgia, hyperthyroidism, suicide attempt, mental illness and substance abuse    Anxiety   Symptoms include decreased concentration, depressed mood, excessive worry and nervous/anxious behavior. Patient reports no chest pain, compulsions, confusion, malaise, muscle tension, obsessions, palpitations, panic, restlessness, shortness of breath or suicidal ideas.     Her past medical history is significant for anxiety/panic attacks and depression. There is no history of arrhythmia, asthma, CAD, chronic lung disease or hyperthyroidism.   Sinus Problem   This is a recurrent problem. The current episode started 1 to 4 weeks ago. The problem has been gradually worsening since onset. There has been no fever. The fever has been present for less than 1 day. Her pain is at a severity of 5/10. The pain is moderate. Associated symptoms include congestion, sinus pressure, sneezing and a sore throat. Pertinent negatives include no chills, coughing, diaphoresis, ear pain,  headaches, hoarse voice, neck pain, shortness of breath or swollen glands. The treatment provided moderate relief.        The following portions of the patient's history were reviewed and updated as appropriate: allergies, current medications, past social history and problem list.    Review of Systems   Constitutional: Positive for fatigue. Negative for activity change, appetite change, chills, diaphoresis, fever and unexpected weight change.   HENT: Positive for congestion, postnasal drip, rhinorrhea, sinus pain, sinus pressure, sneezing and sore throat. Negative for dental problem, drooling, ear discharge, ear pain, facial swelling, hearing loss, hoarse voice, mouth sores, nosebleeds, tinnitus, trouble swallowing and voice change.    Eyes: Negative.    Respiratory: Negative.  Negative for apnea, cough, choking, chest tightness, shortness of breath, wheezing and stridor.    Cardiovascular: Negative.  Negative for chest pain, palpitations and leg swelling.   Gastrointestinal: Negative.  Negative for abdominal distention, abdominal pain, anal bleeding, blood in stool and constipation.   Endocrine: Negative.    Genitourinary: Negative.    Musculoskeletal: Negative.  Negative for arthralgias, back pain, neck pain and neck stiffness.   Skin: Negative.  Negative for color change.   Allergic/Immunologic: Negative.  Negative for environmental allergies, food allergies and immunocompromised state.   Neurological: Negative.  Negative for tremors, weakness and headaches.   Hematological: Negative.  Negative for adenopathy. Does not bruise/bleed easily.   Psychiatric/Behavioral: Positive for decreased concentration and sleep disturbance. Negative for agitation, behavioral problems, confusion, dysphoric mood, hallucinations, self-injury, substance abuse and suicidal ideas. The patient is nervous/anxious. The patient is not hyperactive.    All other systems reviewed and are negative.      Objective   /78  Ht 170.2 cm  "(67\")  Wt 64 kg (141 lb)  BMI 22.08 kg/m2  Physical Exam   Constitutional: She is oriented to person, place, and time. She appears well-developed and well-nourished. No distress.   HENT:   Head: Normocephalic and atraumatic.   Mouth/Throat: Oropharyngeal exudate present.   Eyes: EOM are normal. Pupils are equal, round, and reactive to light. Right eye exhibits no discharge. Left eye exhibits no discharge. No scleral icterus.   Neck: Normal range of motion. No JVD present. No tracheal deviation present. No thyromegaly present.   Cardiovascular: Normal rate.  Exam reveals no gallop and no friction rub.    No murmur heard.  Pulmonary/Chest: Effort normal and breath sounds normal. No stridor. No respiratory distress. She has no wheezes. She has no rales. She exhibits no tenderness.   Abdominal: Soft. Bowel sounds are normal. She exhibits no distension and no mass. There is no tenderness. There is no rebound and no guarding. No hernia.   Musculoskeletal: Normal range of motion. She exhibits no edema, tenderness or deformity.   Lymphadenopathy:     She has no cervical adenopathy.   Neurological: She is alert and oriented to person, place, and time. She has normal reflexes. She displays normal reflexes. No cranial nerve deficit. She exhibits normal muscle tone. Coordination normal.   Skin: Skin is warm and dry. No rash noted. She is not diaphoretic. No erythema. No pallor.   Nursing note and vitals reviewed.      Assessment/Plan   Problem List Items Addressed This Visit        Endocrine    Acquired hypothyroidism    Relevant Orders    CBC & Differential    Comprehensive Metabolic Panel    Iron    TSH    Magnesium    Vitamin B12    Vitamin D 25 Hydroxy    CBC Auto Differential       Other    Anxiety - Primary    Relevant Orders    CBC & Differential    Comprehensive Metabolic Panel    Iron    TSH    Magnesium    Vitamin B12    Vitamin D 25 Hydroxy    CBC Auto Differential    Malaise    Relevant Orders    CBC & " Differential    Comprehensive Metabolic Panel    Iron    TSH    Magnesium    Vitamin B12    Vitamin D 25 Hydroxy    CBC Auto Differential           New Medications Ordered This Visit   Medications   • escitalopram (LEXAPRO) 10 MG tablet     Sig: Take 1 tablet by mouth Daily.     Dispense:  30 tablet     Refill:  11   • busPIRone (BUSPAR) 10 MG tablet     Sig: Tid prn     Dispense:  60 tablet     Refill:  5   • cefprozil (CEFZIL) 500 MG tablet     Sig: Take 1 tablet by mouth 2 (Two) Times a Day.     Dispense:  20 tablet     Refill:  0   • hydrOXYzine (VISTARIL) 25 MG capsule     Sig: Take 1-2 at night     Dispense:  60 capsule     Refill:  5       Stress relief discussed in length. Consider therapy, suggest yoga, exercise, meditation -patient is agrees-will call back if worsen for referral

## 2018-03-02 RX ORDER — LEVOTHYROXINE SODIUM 88 UG/1
88 TABLET ORAL DAILY
Qty: 30 TABLET | Refills: 5 | Status: SHIPPED | OUTPATIENT
Start: 2018-03-02 | End: 2018-10-04 | Stop reason: SDUPTHER

## 2018-03-02 RX ORDER — ERGOCALCIFEROL 1.25 MG/1
50000 CAPSULE ORAL
Qty: 4 CAPSULE | Refills: 5 | Status: SHIPPED | OUTPATIENT
Start: 2018-03-02 | End: 2018-03-05

## 2018-03-14 RX ORDER — ERGOCALCIFEROL 1.25 MG/1
50000 CAPSULE ORAL
Qty: 4 CAPSULE | Refills: 5 | Status: SHIPPED | OUTPATIENT
Start: 2018-03-14 | End: 2018-03-29 | Stop reason: RX

## 2018-03-29 ENCOUNTER — APPOINTMENT (OUTPATIENT)
Dept: LAB | Facility: HOSPITAL | Age: 23
End: 2018-03-29

## 2018-03-29 ENCOUNTER — OFFICE VISIT (OUTPATIENT)
Dept: FAMILY MEDICINE CLINIC | Facility: CLINIC | Age: 23
End: 2018-03-29

## 2018-03-29 VITALS
BODY MASS INDEX: 21.82 KG/M2 | HEIGHT: 67 IN | WEIGHT: 139 LBS | SYSTOLIC BLOOD PRESSURE: 100 MMHG | DIASTOLIC BLOOD PRESSURE: 62 MMHG

## 2018-03-29 DIAGNOSIS — E55.9 VITAMIN D DEFICIENCY: ICD-10-CM

## 2018-03-29 DIAGNOSIS — F51.01 PRIMARY INSOMNIA: ICD-10-CM

## 2018-03-29 DIAGNOSIS — F41.9 ANXIETY: ICD-10-CM

## 2018-03-29 DIAGNOSIS — E03.9 ACQUIRED HYPOTHYROIDISM: Primary | ICD-10-CM

## 2018-03-29 LAB — TSH SERPL DL<=0.05 MIU/L-ACNC: 0.92 MIU/ML (ref 0.46–4.68)

## 2018-03-29 PROCEDURE — 36415 COLL VENOUS BLD VENIPUNCTURE: CPT | Performed by: NURSE PRACTITIONER

## 2018-03-29 PROCEDURE — 99213 OFFICE O/P EST LOW 20 MIN: CPT | Performed by: NURSE PRACTITIONER

## 2018-03-29 PROCEDURE — 84443 ASSAY THYROID STIM HORMONE: CPT | Performed by: NURSE PRACTITIONER

## 2018-03-29 RX ORDER — DOXEPIN HYDROCHLORIDE 25 MG/1
25 CAPSULE ORAL NIGHTLY
Qty: 30 CAPSULE | Refills: 11 | Status: SHIPPED | OUTPATIENT
Start: 2018-03-29 | End: 2018-06-21

## 2018-03-29 RX ORDER — ERGOCALCIFEROL 1.25 MG/1
50000 CAPSULE ORAL
Qty: 4 CAPSULE | Refills: 11 | Status: SHIPPED | OUTPATIENT
Start: 2018-03-29 | End: 2018-12-07 | Stop reason: RX

## 2018-03-29 NOTE — PROGRESS NOTES
Chief Complaint   Patient presents with   • Follow-up     4 week check up    • Anxiety   • Depression     Subjective   Lala Padilla is a 23 y.o. female.     Anxiety   Presents for follow-up visit. Symptoms include depressed mood, excessive worry and insomnia. Patient reports no chest pain, compulsions, decreased concentration, dizziness, feeling of choking, hyperventilation, impotence, irritability, malaise, muscle tension, nausea, nervous/anxious behavior, obsessions, palpitations, panic, restlessness, shortness of breath or suicidal ideas. Symptoms occur occasionally. The severity of symptoms is mild and interfering with daily activities. The quality of sleep is good. Nighttime awakenings: none.     Her past medical history is significant for depression. Compliance with medications is %.   Depression   Visit Type: initial  Onset of symptoms: 1 to 6 months ago  Progression since onset: gradually worsening  Patient presents with the following symptoms: depressed mood, excessive worry, feelings of worthlessness and insomnia.  Patient is not experiencing: anhedonia, chest pain, choking sensation, compulsions, decreased concentration, dizziness, hyperventilation, impotence, irritability, malaise, memory impairment, muscle tension, nausea, nervousness/anxiety, obsessions, palpitations, panic, psychomotor agitation, psychomotor retardation, restlessness, shortness of breath and suicidal ideas.  Severity: severe   Sleep quality: fair  Nighttime awakenings: none  Patient has a history of: depression  No history of: anemia, bipolar disorder, CHF, fibromyalgia, suicide attempt, mental illness and substance abuse    Insomnia   This is a recurrent problem. The current episode started more than 1 year ago. The problem occurs constantly. The problem has been gradually worsening. Associated symptoms include fatigue. Pertinent negatives include no abdominal pain, arthralgias, chest pain, congestion, fever,  "nausea or weakness. Nothing aggravates the symptoms. Treatments tried: vistaril and it did not help         The following portions of the patient's history were reviewed and updated as appropriate: allergies, current medications, past social history and problem list.    Review of Systems   Constitutional: Positive for fatigue. Negative for activity change, appetite change, fever, irritability and unexpected weight change.   HENT: Positive for postnasal drip, rhinorrhea and sinus pain. Negative for congestion, dental problem, drooling, ear discharge, ear pain, facial swelling, hearing loss, mouth sores, nosebleeds, tinnitus, trouble swallowing and voice change.    Eyes: Negative.    Respiratory: Negative.  Negative for apnea, choking, chest tightness, shortness of breath, wheezing and stridor.    Cardiovascular: Negative.  Negative for chest pain, palpitations and leg swelling.   Gastrointestinal: Negative.  Negative for abdominal distention, abdominal pain, anal bleeding, blood in stool, constipation and nausea.   Endocrine: Negative.    Genitourinary: Negative.  Negative for difficulty urinating and impotence.   Musculoskeletal: Negative.  Negative for arthralgias, back pain, gait problem and neck stiffness.   Skin: Negative.  Negative for color change.   Allergic/Immunologic: Negative.  Negative for environmental allergies, food allergies and immunocompromised state.   Neurological: Negative.  Negative for dizziness, tremors and weakness.   Hematological: Negative.  Negative for adenopathy. Does not bruise/bleed easily.   Psychiatric/Behavioral: Positive for sleep disturbance. Negative for decreased concentration, dysphoric mood, hallucinations, substance abuse and suicidal ideas. The patient has insomnia. The patient is not nervous/anxious and is not hyperactive.         Symptoms have improved with meds    All other systems reviewed and are negative.      Objective   /62   Ht 170.2 cm (67\")   Wt 63 kg " (139 lb)   BMI 21.77 kg/m²   Physical Exam   Constitutional: She is oriented to person, place, and time. She appears well-developed and well-nourished. No distress.   HENT:   Head: Normocephalic and atraumatic.   Mouth/Throat: No oropharyngeal exudate.   Eyes: EOM are normal. Pupils are equal, round, and reactive to light. Right eye exhibits no discharge. Left eye exhibits no discharge. No scleral icterus.   Neck: Normal range of motion. Neck supple. No JVD present. No tracheal deviation present. No thyromegaly present.   Cardiovascular: Normal rate and regular rhythm.  Exam reveals no gallop and no friction rub.    No murmur heard.  Pulmonary/Chest: Effort normal and breath sounds normal. No stridor. No respiratory distress. She has no wheezes. She has no rales. She exhibits no tenderness.   Abdominal: Soft. Bowel sounds are normal. She exhibits no distension and no mass. There is no tenderness. There is no rebound and no guarding. No hernia.   Musculoskeletal: Normal range of motion. She exhibits no edema, tenderness or deformity.   Lymphadenopathy:     She has no cervical adenopathy.   Neurological: She is alert and oriented to person, place, and time. She has normal reflexes. She displays normal reflexes. No cranial nerve deficit. She exhibits normal muscle tone. Coordination normal.   Skin: Skin is warm and dry. No rash noted. She is not diaphoretic. No erythema. No pallor.   Nursing note and vitals reviewed.      Assessment/Plan   Problem List Items Addressed This Visit        Digestive    Vitamin D deficiency       Endocrine    Acquired hypothyroidism - Primary    Relevant Orders    TSH       Other    Anxiety    Primary insomnia      Other Visit Diagnoses    None.          New Medications Ordered This Visit   Medications   • doxepin (SINEquan) 25 MG capsule     Sig: Take 1 capsule by mouth Every Night.     Dispense:  30 capsule     Refill:  11   • vitamin D (ERGOCALCIFEROL) 34149 units capsule capsule      Sig: Take 1 capsule by mouth Every 7 (Seven) Days.     Dispense:  4 capsule     Refill:  11       Stress relief discussed in length. Consider therapy, suggest yoga, exercise, meditation -patient is agrees-will call back if worsen for referral

## 2018-06-21 ENCOUNTER — APPOINTMENT (OUTPATIENT)
Dept: LAB | Facility: HOSPITAL | Age: 23
End: 2018-06-21

## 2018-06-21 ENCOUNTER — OFFICE VISIT (OUTPATIENT)
Dept: FAMILY MEDICINE CLINIC | Facility: CLINIC | Age: 23
End: 2018-06-21

## 2018-06-21 VITALS
DIASTOLIC BLOOD PRESSURE: 70 MMHG | SYSTOLIC BLOOD PRESSURE: 110 MMHG | WEIGHT: 144.5 LBS | TEMPERATURE: 97.4 F | BODY MASS INDEX: 22.68 KG/M2 | HEIGHT: 67 IN

## 2018-06-21 DIAGNOSIS — J32.9 RECURRENT SINUS INFECTIONS: ICD-10-CM

## 2018-06-21 DIAGNOSIS — E03.9 ACQUIRED HYPOTHYROIDISM: Primary | ICD-10-CM

## 2018-06-21 DIAGNOSIS — F41.9 ANXIETY: ICD-10-CM

## 2018-06-21 DIAGNOSIS — E55.9 VITAMIN D DEFICIENCY: ICD-10-CM

## 2018-06-21 PROBLEM — J01.00 ACUTE NON-RECURRENT MAXILLARY SINUSITIS: Status: ACTIVE | Noted: 2018-06-21

## 2018-06-21 LAB
25(OH)D3 SERPL-MCNC: 33.6 NG/ML (ref 30–100)
TSH SERPL DL<=0.05 MIU/L-ACNC: 0.24 MIU/ML (ref 0.46–4.68)

## 2018-06-21 PROCEDURE — 99213 OFFICE O/P EST LOW 20 MIN: CPT | Performed by: NURSE PRACTITIONER

## 2018-06-21 PROCEDURE — 84443 ASSAY THYROID STIM HORMONE: CPT | Performed by: NURSE PRACTITIONER

## 2018-06-21 PROCEDURE — 82306 VITAMIN D 25 HYDROXY: CPT | Performed by: NURSE PRACTITIONER

## 2018-06-21 PROCEDURE — 36415 COLL VENOUS BLD VENIPUNCTURE: CPT | Performed by: NURSE PRACTITIONER

## 2018-06-21 RX ORDER — CEFUROXIME AXETIL 500 MG/1
500 TABLET ORAL 2 TIMES DAILY
Qty: 20 TABLET | Refills: 0 | Status: SHIPPED | OUTPATIENT
Start: 2018-06-21 | End: 2018-12-07

## 2018-06-21 RX ORDER — FLUTICASONE PROPIONATE 50 MCG
2 SPRAY, SUSPENSION (ML) NASAL DAILY
Qty: 16 G | Refills: 11 | Status: SHIPPED | OUTPATIENT
Start: 2018-06-21 | End: 2018-12-07

## 2018-06-21 RX ORDER — LORATADINE 10 MG/1
10 TABLET ORAL DAILY
Qty: 30 TABLET | Refills: 11 | Status: SHIPPED | OUTPATIENT
Start: 2018-06-21 | End: 2018-12-07

## 2018-06-21 NOTE — PROGRESS NOTES
Chief Complaint   Patient presents with   • Sinusitis     Subjective   Lala Padilla is a 23 y.o. female.     Presents with recurrent sinus infections-requesting a referral to ENT       Anxiety   Presents for follow-up visit. Symptoms include malaise and nervous/anxious behavior. Patient reports no dizziness, feeling of choking, irritability, panic, restlessness, shortness of breath or suicidal ideas. Symptoms occur occasionally. The severity of symptoms is mild. The quality of sleep is good. Nighttime awakenings: none.     Compliance with medications is %.   Sinusitis   This is a recurrent problem. The current episode started 1 to 4 weeks ago. The problem has been waxing and waning since onset. There has been no fever. The fever has been present for 3 to 4 days. Her pain is at a severity of 7/10. The pain is moderate. Associated symptoms include congestion, headaches, sinus pressure, a sore throat and swollen glands. Pertinent negatives include no ear pain, hoarse voice, neck pain, shortness of breath or sneezing. Past treatments include lying down, saline sprays, sitting up, spray decongestants, antibiotics and acetaminophen. The treatment provided mild relief.        The following portions of the patient's history were reviewed and updated as appropriate: allergies, current medications, past social history and problem list.    Review of Systems   Constitutional: Negative for activity change, appetite change, irritability and unexpected weight change.   HENT: Positive for congestion, postnasal drip, rhinorrhea, sinus pain, sinus pressure and sore throat. Negative for dental problem, drooling, ear discharge, ear pain, facial swelling, hearing loss, hoarse voice, mouth sores, nosebleeds, sneezing, tinnitus, trouble swallowing and voice change.    Eyes: Negative.    Respiratory: Negative for apnea, chest tightness, shortness of breath, wheezing and stridor.    Cardiovascular: Negative.  Negative  "for leg swelling.   Gastrointestinal: Negative.  Negative for abdominal distention, anal bleeding, blood in stool, constipation and diarrhea.   Endocrine: Negative.    Genitourinary: Negative.  Negative for difficulty urinating.   Musculoskeletal: Negative.  Negative for back pain, gait problem, joint swelling, myalgias, neck pain and neck stiffness.   Skin: Negative.  Negative for color change.   Allergic/Immunologic: Negative.  Negative for environmental allergies, food allergies and immunocompromised state.   Neurological: Positive for headaches. Negative for dizziness and tremors.   Hematological: Negative.  Negative for adenopathy. Does not bruise/bleed easily.   Psychiatric/Behavioral: Positive for sleep disturbance. Negative for dysphoric mood, hallucinations and suicidal ideas. The patient is nervous/anxious. The patient is not hyperactive.         Symptoms have improved with meds    All other systems reviewed and are negative.      Objective   /70   Temp 97.4 °F (36.3 °C) (Tympanic)   Ht 170.2 cm (67\")   Wt 65.5 kg (144 lb 8 oz)   BMI 22.63 kg/m²   Physical Exam   Constitutional: She is oriented to person, place, and time. She appears well-developed and well-nourished. No distress.   HENT:   Head: Normocephalic and atraumatic.   Right Ear: External ear normal.   Left Ear: External ear normal.   Nose: Nose normal.   Mouth/Throat: Oropharyngeal exudate present.   Recurrent sinus infections-asking for a referral for ent    Eyes: Conjunctivae and EOM are normal. Pupils are equal, round, and reactive to light. Right eye exhibits no discharge. Left eye exhibits no discharge. No scleral icterus.   Neck: Normal range of motion. Neck supple. No JVD present. No tracheal deviation present. No thyromegaly present.   Cardiovascular: Normal rate and regular rhythm.  Exam reveals no gallop and no friction rub.    No murmur heard.  Pulmonary/Chest: Effort normal and breath sounds normal. No stridor. No " respiratory distress. She has no wheezes. She has no rales. She exhibits no tenderness.   Abdominal: Soft. Bowel sounds are normal. She exhibits no distension and no mass. There is no tenderness. There is no rebound and no guarding. No hernia.   Musculoskeletal: Normal range of motion. She exhibits no edema, tenderness or deformity.   Lymphadenopathy:     She has no cervical adenopathy.   Neurological: She is alert and oriented to person, place, and time. She has normal reflexes. She displays normal reflexes. No cranial nerve deficit. She exhibits normal muscle tone. Coordination normal.   Skin: Skin is warm and dry. No rash noted. She is not diaphoretic. No erythema. No pallor.   Nursing note and vitals reviewed.      Assessment/Plan   Problem List Items Addressed This Visit        Respiratory    Recurrent sinus infections    Relevant Medications    cefuroxime (CEFTIN) 500 MG tablet    loratadine (CLARITIN) 10 MG tablet    fluticasone (FLONASE) 50 MCG/ACT nasal spray    Other Relevant Orders    Ambulatory Referral to ENT (Otolaryngology)       Digestive    Vitamin D deficiency    Relevant Medications    cefuroxime (CEFTIN) 500 MG tablet    loratadine (CLARITIN) 10 MG tablet    fluticasone (FLONASE) 50 MCG/ACT nasal spray    Other Relevant Orders    TSH    Vitamin D 25 Hydroxy    Ambulatory Referral to ENT (Otolaryngology)       Endocrine    Acquired hypothyroidism - Primary    Relevant Medications    cefuroxime (CEFTIN) 500 MG tablet    loratadine (CLARITIN) 10 MG tablet    fluticasone (FLONASE) 50 MCG/ACT nasal spray    Other Relevant Orders    TSH    Vitamin D 25 Hydroxy    Ambulatory Referral to ENT (Otolaryngology)       Other    Anxiety    Relevant Medications    cefuroxime (CEFTIN) 500 MG tablet    loratadine (CLARITIN) 10 MG tablet    fluticasone (FLONASE) 50 MCG/ACT nasal spray    Other Relevant Orders    TSH    Vitamin D 25 Hydroxy    Ambulatory Referral to ENT (Otolaryngology)           New Medications  Ordered This Visit   Medications   • cefuroxime (CEFTIN) 500 MG tablet     Sig: Take 1 tablet by mouth 2 (Two) Times a Day.     Dispense:  20 tablet     Refill:  0   • loratadine (CLARITIN) 10 MG tablet     Sig: Take 1 tablet by mouth Daily.     Dispense:  30 tablet     Refill:  11   • fluticasone (FLONASE) 50 MCG/ACT nasal spray     Si sprays into each nostril Daily.     Dispense:  16 g     Refill:  11      FLUIDS, REST, MEDS AS DIRECTED, RETURN IF WORSEN       ENT if worsen-claritin, flonase and ceftin-cancel ent if this improves with meds

## 2018-06-28 ENCOUNTER — OFFICE VISIT (OUTPATIENT)
Dept: OBSTETRICS AND GYNECOLOGY | Facility: CLINIC | Age: 23
End: 2018-06-28

## 2018-06-28 VITALS
SYSTOLIC BLOOD PRESSURE: 100 MMHG | WEIGHT: 144 LBS | BODY MASS INDEX: 22.6 KG/M2 | HEIGHT: 67 IN | DIASTOLIC BLOOD PRESSURE: 69 MMHG

## 2018-06-28 DIAGNOSIS — N94.6 DYSMENORRHEA: Primary | ICD-10-CM

## 2018-06-28 DIAGNOSIS — E03.9 ACQUIRED HYPOTHYROIDISM: ICD-10-CM

## 2018-06-28 PROCEDURE — 99213 OFFICE O/P EST LOW 20 MIN: CPT | Performed by: OBSTETRICS & GYNECOLOGY

## 2018-07-02 ENCOUNTER — TELEPHONE (OUTPATIENT)
Dept: FAMILY MEDICINE CLINIC | Facility: CLINIC | Age: 23
End: 2018-07-02

## 2018-07-02 NOTE — TELEPHONE ENCOUNTER
I was unable to reach Lala so she gave her Mother permission to get her lab results. Mother was notified with Lala's results being normal. Paola stated that her thyroid was a little overactive but as along as she was feeling ok she would not change anything..----- Message from MILLY Oakes sent at 6/22/2018  8:33 AM CDT -----  Can you let her know her vitamin d is great, thyroid is working very well-actually a little over but this is good as long as she is good-no changes needed as long as she is feeling good. Recheck in 3 months

## 2018-10-04 RX ORDER — LEVOTHYROXINE SODIUM 88 UG/1
TABLET ORAL
Qty: 30 TABLET | Refills: 5 | Status: SHIPPED | OUTPATIENT
Start: 2018-10-04 | End: 2018-12-12 | Stop reason: DRUGHIGH

## 2018-10-14 NOTE — PROGRESS NOTES
Lala Padilla is a 23 y.o. y/o female     Chief Complaint: Pelvic pain and dysmenorrhea and probable endometriosis    HPI: 23-year-old G0 who states that she is not sexually active.  Presents for treatment for pelvic pain and dysmenorrhea and suspected endometriosis.    Pap smear 9/28/2017 was Negative for intraepithelial lesion or malignancy.    We discussed endometriosis and how it is definitively diagnosed and some of the treatment options for it.  I suggested that she try a low-dose birth control pill, and she is ready to try that.    She is a nonsmoker, single, and unemployed.      She has hypothyroidism for which she takes 50 µg of Synthroid a day.      She states that her mother had thyroid cancer.     Past surgical history include tonsillectomy in 2008 and surgery for kidney stones in 2014.    June 28, 2018, pain has improved some with birth control pills.    Review of Systems   Constitutional: Positive for fatigue. Negative for activity change, appetite change, chills, diaphoresis, fever and unexpected weight change.   Gastrointestinal: Negative for abdominal pain, constipation, diarrhea and nausea.   Genitourinary: Positive for menstrual problem and pelvic pain. Negative for difficulty urinating, dysuria, urgency, vaginal bleeding, vaginal discharge and vaginal pain.   Neurological: Negative for headaches.   Psychiatric/Behavioral: Positive for dysphoric mood and sleep disturbance. The patient is not nervous/anxious.    All other systems reviewed and are negative.     Breast ROS: negative    The following portions of the patient's history were reviewed and updated as appropriate: allergies, current medications, past family history, past medical history, past social history, past surgical history and problem list.    No Known Allergies       Current Outpatient Prescriptions:   •  cefuroxime (CEFTIN) 500 MG tablet, Take 1 tablet by mouth 2 (Two) Times a Day., Disp: 20 tablet, Rfl: 0  •   escitalopram (LEXAPRO) 10 MG tablet, Take 1 tablet by mouth Daily., Disp: 30 tablet, Rfl: 11  •  fluticasone (FLONASE) 50 MCG/ACT nasal spray, 2 sprays into each nostril Daily., Disp: 16 g, Rfl: 11  •  loratadine (CLARITIN) 10 MG tablet, Take 1 tablet by mouth Daily., Disp: 30 tablet, Rfl: 11  •  norethindrone-ethinyl estradiol (LOESTRIN 1/20, 21,) 1-20 MG-MCG per tablet, Take 1 tablet by mouth Daily., Disp: 21 tablet, Rfl: 12  •  vitamin D (ERGOCALCIFEROL) 99923 units capsule capsule, Take 1 capsule by mouth Every 7 (Seven) Days., Disp: 4 capsule, Rfl: 11  •  levothyroxine (SYNTHROID, LEVOTHROID) 88 MCG tablet, TAKE 1 TABLET BY MOUTH ONCE DAILY, Disp: 30 tablet, Rfl: 5     The patient has a family history of   Family History   Problem Relation Age of Onset   • Cancer Other    • Diabetes Other    • Heart disease Other    • Hypertension Other    • Thyroid disease Other         Past Medical History:   Diagnosis Date   • Acute tonsillitis    • Bronchitis    • Cellulitis     History of abscess, near left axilla      • Contusion of knee    • Depressive disorder    • Diarrhea    • Disease of thyroid gland    • Dysmenorrhea 9/28/2017   • Dysuria    • Encounter for gynecological examination (general) (routine) without abnormal findings    • Encounter for removal of sutures    • Encounter for surveillance of contraceptive pills    • Folliculitis    • Fracture of phalanx of hand     Left thumb      • Gastroenteritis    • Generalized anxiety disorder    • Hypothyroidism, unspecified    • Irregular periods    • Kidney stone    • Knee pain    • Lipoma    • Ovarian cyst    • Pain in joint, ankle and foot    • Pain in wrist    • Panic disorder    • Ruptured cyst of ovary    • Sports physical    • Sprain of ankle    • Strain of neck muscle    • Tick bite    • Upper respiratory infection         OB History     No data available           Social History     Social History   • Marital status: Single     Spouse name: N/A   • Number of  "children: N/A   • Years of education: N/A     Occupational History   • Not on file.     Social History Main Topics   • Smoking status: Never Smoker   • Smokeless tobacco: Never Used   • Alcohol use No   • Drug use: No   • Sexual activity: Not Currently     Birth control/ protection: None     Other Topics Concern   • Not on file     Social History Narrative   • No narrative on file        Past Surgical History:   Procedure Laterality Date   • ADENOIDECTOMY     • INCISION AND DRAINAGE ABSCESS  02/04/2009    Left axillary abscess   • INJECTION OF MEDICATION  05/31/2014    Toradol(1)   • INJECTION OF MEDICATION  05/31/2014    Zofran(1)   • KIDNEY STONE SURGERY     • TONSILLECTOMY          Patient Active Problem List   Diagnosis   • Thyroid fullness   • General medical exam   • Acquired hypothyroidism   • Pelvic pain   • Dysmenorrhea   • Anxiety   • Malaise   • Primary insomnia   • Vitamin D deficiency   • Acute non-recurrent maxillary sinusitis   • Recurrent sinus infections        Documented Vitals    06/28/18 1538   BP: 100/69   Weight: 65.3 kg (144 lb)   Height: 170.2 cm (67\")   PainSc: 0-No pain       Physical Exam   Constitutional: She is oriented to person, place, and time. She appears well-developed and well-nourished. No distress.   144 pounds with BMI 22.6.  Blood pressure 100/69.  Pulse 80.   HENT:   Head: Normocephalic and atraumatic.   Eyes: Pupils are equal, round, and reactive to light. Conjunctivae and EOM are normal.   Neck: Normal range of motion. Neck supple. No JVD present. No tracheal deviation present. No thyromegaly present.   Cardiovascular: Normal rate, regular rhythm, normal heart sounds and intact distal pulses.  Exam reveals no gallop and no friction rub.    No murmur heard.  Pulmonary/Chest: Effort normal and breath sounds normal. No stridor. No respiratory distress. She has no wheezes. She has no rales. She exhibits no tenderness.   Abdominal: Soft. Bowel sounds are normal. She exhibits no " distension and no mass. There is no tenderness. There is no rebound and no guarding. No hernia.   Musculoskeletal: Normal range of motion. She exhibits no edema, tenderness or deformity.   Lymphadenopathy:     She has no cervical adenopathy.   Neurological: She is alert and oriented to person, place, and time. She has normal reflexes. She displays normal reflexes. No cranial nerve deficit. She exhibits normal muscle tone. Coordination normal.   Skin: Skin is warm and dry. No rash noted. She is not diaphoretic. No erythema. No pallor.   Psychiatric: She has a normal mood and affect. Her behavior is normal. Judgment and thought content normal.   Nursing note and vitals reviewed.      Assessment        Diagnosis Plan   1. Dysmenorrhea     2. Acquired hypothyroidism           Plan      1. Continue LoEstrin 1/20 OCPs.  2. Continue thyroid replacement therapy  3. Recommend vitamin D3 supplementation.  4. Encouraged in healthy diet and exercise.   5. Follow-up in 6 months.  Follow-up sooner as needed.            This document has been electronically signed by Polo Keenan MD on October 14, 2018 4:55 PM

## 2018-12-07 ENCOUNTER — OFFICE VISIT (OUTPATIENT)
Dept: FAMILY MEDICINE CLINIC | Facility: CLINIC | Age: 23
End: 2018-12-07

## 2018-12-07 ENCOUNTER — APPOINTMENT (OUTPATIENT)
Dept: LAB | Facility: HOSPITAL | Age: 23
End: 2018-12-07

## 2018-12-07 VITALS
HEIGHT: 67 IN | WEIGHT: 142 LBS | BODY MASS INDEX: 22.29 KG/M2 | DIASTOLIC BLOOD PRESSURE: 70 MMHG | SYSTOLIC BLOOD PRESSURE: 120 MMHG

## 2018-12-07 DIAGNOSIS — E03.9 ACQUIRED HYPOTHYROIDISM: Primary | ICD-10-CM

## 2018-12-07 DIAGNOSIS — F41.9 ANXIETY: ICD-10-CM

## 2018-12-07 LAB
T3 SERPL-MCNC: 142 NG/DL (ref 97–169)
T4 FREE SERPL-MCNC: 1.25 NG/DL (ref 0.78–2.19)
TSH SERPL DL<=0.05 MIU/L-ACNC: 6.97 MIU/ML (ref 0.46–4.68)

## 2018-12-07 PROCEDURE — 84443 ASSAY THYROID STIM HORMONE: CPT | Performed by: NURSE PRACTITIONER

## 2018-12-07 PROCEDURE — 84480 ASSAY TRIIODOTHYRONINE (T3): CPT | Performed by: NURSE PRACTITIONER

## 2018-12-07 PROCEDURE — 36415 COLL VENOUS BLD VENIPUNCTURE: CPT | Performed by: NURSE PRACTITIONER

## 2018-12-07 PROCEDURE — 84439 ASSAY OF FREE THYROXINE: CPT | Performed by: NURSE PRACTITIONER

## 2018-12-07 PROCEDURE — 99213 OFFICE O/P EST LOW 20 MIN: CPT | Performed by: NURSE PRACTITIONER

## 2018-12-07 NOTE — PROGRESS NOTES
Chief Complaint   Patient presents with   • Follow-up     thyroid issues , having symptoms      Subjective   Lala Padilla is a 23 y.o. female.     Anxiety   Presents for follow-up visit. Symptoms include depressed mood, malaise and nervous/anxious behavior. Patient reports no chest pain, confusion, decreased concentration, dizziness, feeling of choking, irritability, nausea, palpitations, panic, restlessness, shortness of breath or suicidal ideas. Symptoms occur occasionally. The severity of symptoms is mild. The quality of sleep is good. Nighttime awakenings: none.     Compliance with medications is %.   Fatigue   This is a recurrent problem. The current episode started 1 to 4 weeks ago. The problem occurs every several days. The problem has been gradually worsening. Associated symptoms include congestion and fatigue. Pertinent negatives include no abdominal pain, anorexia, arthralgias, change in bowel habit, chest pain, chills, coughing, diaphoresis, fever, headaches, joint swelling, myalgias, nausea, neck pain, numbness, rash, sore throat, swollen glands, urinary symptoms, vertigo, visual change, vomiting or weakness. She has tried acetaminophen for the symptoms. The treatment provided mild relief.   Thyroid Problem   Presents for follow-up visit. Symptoms include anxiety, depressed mood, dry skin and fatigue. Patient reports no cold intolerance, constipation, diaphoresis, diarrhea, hair loss, heat intolerance, hoarse voice, leg swelling, menstrual problem, nail problem, palpitations, tremors, visual change, weight gain or weight loss. The symptoms have been stable.        The following portions of the patient's history were reviewed and updated as appropriate: allergies, current medications, past social history and problem list.    Review of Systems   Constitutional: Positive for activity change, appetite change and fatigue. Negative for chills, diaphoresis, fever, irritability, unexpected  weight change, weight gain and weight loss.   HENT: Positive for congestion and rhinorrhea. Negative for dental problem, drooling, ear discharge, ear pain, facial swelling, hearing loss, hoarse voice, mouth sores, nosebleeds, postnasal drip, sinus pain, sore throat, tinnitus, trouble swallowing and voice change.    Eyes: Negative.  Negative for photophobia, pain, discharge, redness, itching and visual disturbance.   Respiratory: Negative for apnea, cough, choking, chest tightness, shortness of breath, wheezing and stridor.    Cardiovascular: Negative.  Negative for chest pain, palpitations and leg swelling.   Gastrointestinal: Negative.  Negative for abdominal distention, abdominal pain, anal bleeding, anorexia, blood in stool, change in bowel habit, constipation, diarrhea, nausea, rectal pain and vomiting.   Endocrine: Negative.  Negative for cold intolerance, heat intolerance, polydipsia, polyphagia and polyuria.   Genitourinary: Negative.  Negative for decreased urine volume, difficulty urinating, dyspareunia, dysuria, menstrual problem and vaginal discharge.   Musculoskeletal: Negative.  Negative for arthralgias, back pain, gait problem, joint swelling, myalgias, neck pain and neck stiffness.   Skin: Negative.  Negative for color change and rash.   Allergic/Immunologic: Negative.  Negative for environmental allergies, food allergies and immunocompromised state.   Neurological: Negative.  Negative for dizziness, vertigo, tremors, seizures, syncope, facial asymmetry, speech difficulty, weakness, light-headedness, numbness and headaches.   Hematological: Negative.  Negative for adenopathy. Does not bruise/bleed easily.   Psychiatric/Behavioral: Positive for sleep disturbance. Negative for agitation, behavioral problems, confusion, decreased concentration, dysphoric mood, hallucinations, self-injury and suicidal ideas. The patient is nervous/anxious. The patient is not hyperactive.         Symptoms have improved  "with meds    All other systems reviewed and are negative.      Objective   /70   Ht 170.2 cm (67\")   Wt 64.4 kg (142 lb)   BMI 22.24 kg/m²   Physical Exam   Constitutional: She is oriented to person, place, and time. She appears well-developed and well-nourished. No distress.   HENT:   Head: Normocephalic and atraumatic.   Right Ear: External ear normal.   Left Ear: External ear normal.   Nose: Nose normal.   Mouth/Throat: Oropharynx is clear and moist. No oropharyngeal exudate.   Eyes: Conjunctivae and EOM are normal. Pupils are equal, round, and reactive to light. Right eye exhibits no discharge. Left eye exhibits no discharge. No scleral icterus.   Neck: Normal range of motion. Neck supple. No JVD present. No tracheal deviation present. No thyromegaly present.   Cardiovascular: Normal rate, regular rhythm, normal heart sounds and intact distal pulses. Exam reveals no gallop and no friction rub.   No murmur heard.  Pulmonary/Chest: Effort normal and breath sounds normal. No stridor. No respiratory distress. She has no wheezes. She has no rales. She exhibits no tenderness.   Abdominal: Soft. Bowel sounds are normal. She exhibits no distension and no mass. There is no tenderness. There is no rebound and no guarding. No hernia.   Musculoskeletal: Normal range of motion. She exhibits no edema, tenderness or deformity.   Lymphadenopathy:     She has no cervical adenopathy.   Neurological: She is alert and oriented to person, place, and time. She has normal reflexes. She displays normal reflexes. No cranial nerve deficit or sensory deficit. She exhibits normal muscle tone. Coordination normal.   Skin: Skin is warm and dry. No rash noted. She is not diaphoretic. No erythema. No pallor.   Nursing note and vitals reviewed.      Assessment/Plan   Problem List Items Addressed This Visit        Endocrine    Acquired hypothyroidism - Primary    Relevant Orders    T4, Free    T3    TSH       Other    Anxiety         No " orders of the defined types were placed in this encounter.      It's not just what you eat, but when you eat  Eat breakfast, and eat smaller meals throughout the day. A healthy breakfast can jumpstart your metabolism, while eating small, healthy meals (rather than the standard three large meals) keeps your energy up.   Avoid eating at night. Try to eat dinner earlier and fast for 14-16 hours until breakfast the next morning. Studies suggest that eating only when you’re most active and giving your digestive system a long break each day may help to regulate weight.     Labs as directed , meds reviewed, diet discussed

## 2018-12-12 ENCOUNTER — OFFICE VISIT (OUTPATIENT)
Dept: FAMILY MEDICINE CLINIC | Facility: CLINIC | Age: 23
End: 2018-12-12

## 2018-12-12 VITALS
WEIGHT: 142 LBS | OXYGEN SATURATION: 99 % | BODY MASS INDEX: 22.29 KG/M2 | SYSTOLIC BLOOD PRESSURE: 110 MMHG | HEIGHT: 67 IN | TEMPERATURE: 99.3 F | DIASTOLIC BLOOD PRESSURE: 62 MMHG

## 2018-12-12 DIAGNOSIS — J20.9 ACUTE BRONCHITIS, UNSPECIFIED ORGANISM: Primary | ICD-10-CM

## 2018-12-12 DIAGNOSIS — R50.9 FEVER, UNSPECIFIED FEVER CAUSE: ICD-10-CM

## 2018-12-12 LAB
EXPIRATION DATE: NORMAL
FLUAV AG NPH QL: NORMAL
FLUBV AG NPH QL: NORMAL
INTERNAL CONTROL: NORMAL
Lab: NORMAL

## 2018-12-12 PROCEDURE — 99213 OFFICE O/P EST LOW 20 MIN: CPT | Performed by: NURSE PRACTITIONER

## 2018-12-12 PROCEDURE — 87804 INFLUENZA ASSAY W/OPTIC: CPT | Performed by: NURSE PRACTITIONER

## 2018-12-12 RX ORDER — DEXTROMETHORPHAN HYDROBROMIDE AND PROMETHAZINE HYDROCHLORIDE 15; 6.25 MG/5ML; MG/5ML
5 SYRUP ORAL 4 TIMES DAILY PRN
Qty: 180 ML | Refills: 0 | Status: SHIPPED | OUTPATIENT
Start: 2018-12-12 | End: 2019-05-03

## 2018-12-12 RX ORDER — AZITHROMYCIN 250 MG/1
TABLET, FILM COATED ORAL
Qty: 6 TABLET | Refills: 1 | Status: SHIPPED | OUTPATIENT
Start: 2018-12-12 | End: 2019-03-13

## 2018-12-12 RX ORDER — LEVOTHYROXINE SODIUM 0.1 MG/1
100 TABLET ORAL DAILY
COMMUNITY
End: 2019-03-15 | Stop reason: DRUGHIGH

## 2018-12-12 RX ORDER — LEVOTHYROXINE SODIUM 0.1 MG/1
100 TABLET ORAL DAILY
Qty: 30 TABLET | Refills: 5 | Status: SHIPPED | OUTPATIENT
Start: 2018-12-12 | End: 2019-03-13

## 2018-12-12 NOTE — PROGRESS NOTES
Chief Complaint   Patient presents with   • Cough   • Nasal Congestion     Subjective   Lala Padilla is a 23 y.o. female.     Cough   This is a new problem. The current episode started yesterday. The problem has been gradually worsening. The problem occurs constantly. The cough is productive of sputum. Associated symptoms include ear congestion, ear pain, a fever, headaches, nasal congestion, postnasal drip, rhinorrhea and a sore throat. Pertinent negatives include no chest pain, chills, heartburn, hemoptysis, myalgias, rash, shortness of breath, sweats, weight loss or wheezing. Her past medical history is significant for bronchitis. There is no history of asthma, bronchiectasis, emphysema, environmental allergies or pneumonia.   Fever    This is a new problem. The current episode started today. The problem occurs rarely. The problem has been gradually worsening. The maximum temperature noted was 101 to 101.9 F. The temperature was taken using an axillary reading. Associated symptoms include congestion, coughing, diarrhea, ear pain, headaches, muscle aches, sleepiness and a sore throat. Pertinent negatives include no abdominal pain, chest pain, nausea, rash, urinary pain, vomiting or wheezing.   Risk factors: sick contacts    Risk factors: no contaminated food, no contaminated water, no hx of cancer, no immunosuppression, no recent sickness and no recent travel         The following portions of the patient's history were reviewed and updated as appropriate: allergies, current medications, past social history and problem list.    Review of Systems   Constitutional: Positive for activity change and fever. Negative for chills and weight loss.   HENT: Positive for congestion, ear pain, postnasal drip, rhinorrhea and sore throat.    Eyes: Negative for photophobia and visual disturbance.   Respiratory: Positive for cough. Negative for hemoptysis, shortness of breath and wheezing.    Cardiovascular:  "Negative for chest pain.   Gastrointestinal: Positive for diarrhea. Negative for abdominal pain, heartburn, nausea and vomiting.   Genitourinary: Negative for dysuria.   Musculoskeletal: Negative for myalgias.   Skin: Negative for rash.   Allergic/Immunologic: Negative for environmental allergies.   Neurological: Positive for headaches. Negative for light-headedness.       Objective   /62   Temp 99.3 °F (37.4 °C)   Ht 170.2 cm (67\")   Wt 64.4 kg (142 lb)   SpO2 99%   BMI 22.24 kg/m²   Physical Exam   Constitutional: She is oriented to person, place, and time. She appears well-developed and well-nourished. No distress.   HENT:   Head: Normocephalic and atraumatic.   Right Ear: External ear normal.   Left Ear: External ear normal.   Nose: Nose normal.   Mouth/Throat: Oropharyngeal exudate present.   Eyes: Conjunctivae and EOM are normal. Pupils are equal, round, and reactive to light. Right eye exhibits no discharge. Left eye exhibits no discharge. No scleral icterus.   Neck: Normal range of motion. Neck supple. No JVD present. No tracheal deviation present. No thyromegaly present.   Cardiovascular: Normal rate, regular rhythm, normal heart sounds and intact distal pulses. Exam reveals no gallop and no friction rub.   No murmur heard.  Pulmonary/Chest: Effort normal and breath sounds normal. No stridor. No respiratory distress. She has no wheezes. She has no rales. She exhibits no tenderness.   Thick pnd present mid facial pain and congestion -diffuse rhonchi with cough    Abdominal: Soft. Bowel sounds are normal. She exhibits no distension and no mass. There is no tenderness. There is no rebound and no guarding. No hernia.   Musculoskeletal: Normal range of motion. She exhibits no edema, tenderness or deformity.   Lymphadenopathy:     She has no cervical adenopathy.   Neurological: She is alert and oriented to person, place, and time. She has normal reflexes. She displays normal reflexes. No cranial nerve " deficit or sensory deficit. She exhibits normal muscle tone. Coordination normal.   Skin: Skin is warm and dry. No rash noted. She is not diaphoretic. No erythema. No pallor.   Nursing note and vitals reviewed.      Assessment/Plan   Problem List Items Addressed This Visit        Respiratory    Acute bronchitis - Primary    Relevant Medications    promethazine-dextromethorphan (PROMETHAZINE-DM) 6.25-15 MG/5ML syrup       Other    Fever    Relevant Orders    POC Influenza A / B           New Medications Ordered This Visit   Medications   • levothyroxine (SYNTHROID) 100 MCG tablet     Sig: Take 1 tablet by mouth Daily.     Dispense:  30 tablet     Refill:  5   • azithromycin (ZITHROMAX) 250 MG tablet     Sig: Take 2 tablets the first day, then 1 tablet daily for 4 days.     Dispense:  6 tablet     Refill:  1   • promethazine-dextromethorphan (PROMETHAZINE-DM) 6.25-15 MG/5ML syrup     Sig: Take 5 mL by mouth 4 (Four) Times a Day As Needed for Cough.     Dispense:  180 mL     Refill:  0   • mupirocin (BACTROBAN) 2 % ointment     Sig: Apply  topically to the appropriate area as directed 3 (Three) Times a Day.     Dispense:  30 g     Refill:  1      meds as directed, increase fluids, return if worsen   Flu negative, meds as directed

## 2019-02-05 RX ORDER — NORETHINDRONE ACETATE AND ETHINYL ESTRADIOL 1; .02 MG/1; MG/1
1 TABLET ORAL DAILY
Qty: 21 TABLET | Refills: 12 | Status: SHIPPED | OUTPATIENT
Start: 2019-02-05 | End: 2019-11-18

## 2019-03-13 ENCOUNTER — APPOINTMENT (OUTPATIENT)
Dept: LAB | Facility: HOSPITAL | Age: 24
End: 2019-03-13

## 2019-03-13 ENCOUNTER — OFFICE VISIT (OUTPATIENT)
Dept: FAMILY MEDICINE CLINIC | Facility: CLINIC | Age: 24
End: 2019-03-13

## 2019-03-13 VITALS
WEIGHT: 140 LBS | DIASTOLIC BLOOD PRESSURE: 75 MMHG | BODY MASS INDEX: 21.97 KG/M2 | HEIGHT: 67 IN | SYSTOLIC BLOOD PRESSURE: 101 MMHG

## 2019-03-13 DIAGNOSIS — E03.9 ACQUIRED HYPOTHYROIDISM: ICD-10-CM

## 2019-03-13 DIAGNOSIS — E55.9 VITAMIN D DEFICIENCY: Primary | ICD-10-CM

## 2019-03-13 LAB
25(OH)D3 SERPL-MCNC: 28.3 NG/ML (ref 30–100)
ALBUMIN SERPL-MCNC: 4.4 G/DL (ref 3.4–4.8)
ALBUMIN/GLOB SERPL: 1.3 G/DL (ref 1.1–1.8)
ALP SERPL-CCNC: 64 U/L (ref 38–126)
ALT SERPL W P-5'-P-CCNC: 37 U/L (ref 9–52)
ANION GAP SERPL CALCULATED.3IONS-SCNC: 6 MMOL/L (ref 5–15)
AST SERPL-CCNC: 39 U/L (ref 14–36)
BASOPHILS # BLD AUTO: 0.04 10*3/MM3 (ref 0–0.2)
BASOPHILS NFR BLD AUTO: 0.4 % (ref 0–1.5)
BILIRUB SERPL-MCNC: 0.3 MG/DL (ref 0.2–1.3)
BUN BLD-MCNC: 13 MG/DL (ref 7–21)
BUN/CREAT SERPL: 10.8 (ref 7–25)
CALCIUM SPEC-SCNC: 9.7 MG/DL (ref 8.4–10.2)
CHLORIDE SERPL-SCNC: 101 MMOL/L (ref 95–110)
CO2 SERPL-SCNC: 28 MMOL/L (ref 22–31)
CREAT BLD-MCNC: 1.2 MG/DL (ref 0.5–1)
DEPRECATED RDW RBC AUTO: 43.5 FL (ref 37–54)
EOSINOPHIL # BLD AUTO: 0.4 10*3/MM3 (ref 0–0.4)
EOSINOPHIL NFR BLD AUTO: 4.1 % (ref 0.3–6.2)
ERYTHROCYTE [DISTWIDTH] IN BLOOD BY AUTOMATED COUNT: 14.6 % (ref 12.3–15.4)
GFR SERPL CREATININE-BSD FRML MDRD: 55 ML/MIN/1.73 (ref 71–165)
GLOBULIN UR ELPH-MCNC: 3.4 GM/DL (ref 2.3–3.5)
GLUCOSE BLD-MCNC: 74 MG/DL (ref 60–100)
HCT VFR BLD AUTO: 38.9 % (ref 34–46.6)
HGB BLD-MCNC: 12.9 G/DL (ref 12–15.9)
IMM GRANULOCYTES # BLD AUTO: 0.03 10*3/MM3 (ref 0–0.05)
IMM GRANULOCYTES NFR BLD AUTO: 0.3 % (ref 0–0.5)
IRON 24H UR-MRATE: 53 MCG/DL (ref 37–170)
LYMPHOCYTES # BLD AUTO: 1.84 10*3/MM3 (ref 0.7–3.1)
LYMPHOCYTES NFR BLD AUTO: 18.6 % (ref 19.6–45.3)
MCH RBC QN AUTO: 27.3 PG (ref 26.6–33)
MCHC RBC AUTO-ENTMCNC: 33.2 G/DL (ref 31.5–35.7)
MCV RBC AUTO: 82.4 FL (ref 79–97)
MONOCYTES # BLD AUTO: 0.64 10*3/MM3 (ref 0.1–0.9)
MONOCYTES NFR BLD AUTO: 6.5 % (ref 5–12)
NEUTROPHILS # BLD AUTO: 6.92 10*3/MM3 (ref 1.4–7)
NEUTROPHILS NFR BLD AUTO: 70.1 % (ref 42.7–76)
NRBC BLD AUTO-RTO: 0 /100 WBC (ref 0–0)
PLATELET # BLD AUTO: 283 10*3/MM3 (ref 140–450)
PMV BLD AUTO: 9.2 FL (ref 6–12)
POTASSIUM BLD-SCNC: 4.3 MMOL/L (ref 3.5–5.1)
PROT SERPL-MCNC: 7.8 G/DL (ref 6.3–8.6)
RBC # BLD AUTO: 4.72 10*6/MM3 (ref 3.77–5.28)
SODIUM BLD-SCNC: 135 MMOL/L (ref 137–145)
TSH SERPL DL<=0.05 MIU/L-ACNC: 9.53 MIU/ML (ref 0.46–4.68)
VIT B12 BLD-MCNC: 305 PG/ML (ref 239–931)
WBC NRBC COR # BLD: 9.87 10*3/MM3 (ref 3.4–10.8)

## 2019-03-13 PROCEDURE — 99213 OFFICE O/P EST LOW 20 MIN: CPT | Performed by: NURSE PRACTITIONER

## 2019-03-13 PROCEDURE — 82607 VITAMIN B-12: CPT | Performed by: NURSE PRACTITIONER

## 2019-03-13 PROCEDURE — 85025 COMPLETE CBC W/AUTO DIFF WBC: CPT | Performed by: NURSE PRACTITIONER

## 2019-03-13 PROCEDURE — 84443 ASSAY THYROID STIM HORMONE: CPT | Performed by: NURSE PRACTITIONER

## 2019-03-13 PROCEDURE — 83540 ASSAY OF IRON: CPT | Performed by: NURSE PRACTITIONER

## 2019-03-13 PROCEDURE — 36415 COLL VENOUS BLD VENIPUNCTURE: CPT | Performed by: NURSE PRACTITIONER

## 2019-03-13 PROCEDURE — 80053 COMPREHEN METABOLIC PANEL: CPT | Performed by: NURSE PRACTITIONER

## 2019-03-13 PROCEDURE — 82306 VITAMIN D 25 HYDROXY: CPT | Performed by: NURSE PRACTITIONER

## 2019-03-13 NOTE — PROGRESS NOTES
Chief Complaint   Patient presents with   • Follow-up   • Hypothyroidism     Subjective   Lala Padilla is a 24 y.o. female.     Hypothyroidism   This is a chronic problem. The current episode started more than 1 year ago. The problem has been gradually improving. Pertinent negatives include no abdominal pain, arthralgias, change in bowel habit, chest pain, chills, diaphoresis, headaches, joint swelling, myalgias, nausea, neck pain, numbness, swollen glands, urinary symptoms, vertigo, visual change or weakness. The treatment provided mild relief.   Anxiety   Presents for follow-up visit. Symptoms include depressed mood, malaise and nervous/anxious behavior. Patient reports no chest pain, confusion, decreased concentration, dizziness, feeling of choking, irritability, nausea, palpitations, panic, restlessness or suicidal ideas. Symptoms occur occasionally. The severity of symptoms is mild. The quality of sleep is good. Nighttime awakenings: none.     Compliance with medications is %.   Fatigue   This is a recurrent problem. The current episode started 1 to 4 weeks ago. The problem occurs every several days. The problem has been gradually worsening. Pertinent negatives include no abdominal pain, arthralgias, change in bowel habit, chest pain, chills, diaphoresis, headaches, joint swelling, myalgias, nausea, neck pain, numbness, swollen glands, urinary symptoms, vertigo, visual change or weakness. She has tried acetaminophen for the symptoms. The treatment provided mild relief.   Thyroid Problem   Presents for follow-up visit. Symptoms include anxiety, depressed mood and dry skin. Patient reports no cold intolerance, constipation, diaphoresis, hair loss, heat intolerance, hoarse voice, leg swelling, menstrual problem, nail problem, palpitations, tremors, visual change, weight gain or weight loss. The symptoms have been stable.        The following portions of the patient's history were reviewed and  "updated as appropriate: allergies, current medications, past social history and problem list.    Review of Systems   Constitutional: Positive for activity change. Negative for appetite change, chills, diaphoresis, irritability, weight gain and weight loss.   HENT: Negative for dental problem, drooling, ear discharge, ear pain, facial swelling, hoarse voice and postnasal drip.    Eyes: Negative.  Negative for photophobia and visual disturbance.   Respiratory: Negative.  Negative for wheezing.    Cardiovascular: Negative.  Negative for chest pain, palpitations and leg swelling.   Gastrointestinal: Negative.  Negative for abdominal distention, abdominal pain, anal bleeding, blood in stool, change in bowel habit, constipation and nausea.   Endocrine: Negative for cold intolerance and heat intolerance.   Genitourinary: Negative.  Negative for dysuria, genital sores, hematuria and menstrual problem.   Musculoskeletal: Negative.  Negative for arthralgias, back pain, gait problem, joint swelling, myalgias, neck pain and neck stiffness.   Skin: Negative.  Negative for color change and pallor.        Requesting refill of bactroban-does not need now but requesting for the future-hx of folliculitis    Allergic/Immunologic: Negative.  Negative for environmental allergies.   Neurological: Negative for dizziness, vertigo, tremors, syncope, weakness, light-headedness, numbness and headaches.   Hematological: Negative.  Negative for adenopathy. Does not bruise/bleed easily.   Psychiatric/Behavioral: Negative for agitation, behavioral problems, confusion, decreased concentration, dysphoric mood, hallucinations, self-injury, sleep disturbance and suicidal ideas. The patient is nervous/anxious. The patient is not hyperactive.         Symptoms improved on meds       Objective   /75 (BP Location: Left arm)   Ht 170.2 cm (67\")   Wt 63.5 kg (140 lb)   BMI 21.93 kg/m²   Physical Exam   Constitutional: She is oriented to person, " place, and time. She appears well-developed and well-nourished. No distress.   HENT:   Head: Normocephalic and atraumatic.   Right Ear: External ear normal.   Left Ear: External ear normal.   Nose: Nose normal.   Mouth/Throat: No oropharyngeal exudate.   Eyes: Conjunctivae and EOM are normal. Pupils are equal, round, and reactive to light. Right eye exhibits no discharge. Left eye exhibits no discharge. No scleral icterus.   Neck: Normal range of motion. Neck supple. No JVD present. No tracheal deviation present. No thyromegaly present.   Cardiovascular: Normal rate, regular rhythm, normal heart sounds and intact distal pulses. Exam reveals no gallop and no friction rub.   No murmur heard.  Pulmonary/Chest: Effort normal and breath sounds normal. No stridor. No respiratory distress. She has no wheezes. She has no rales. She exhibits no tenderness.   Abdominal: Soft. Bowel sounds are normal. She exhibits no distension and no mass. There is no tenderness. There is no rebound and no guarding. No hernia.   Musculoskeletal: Normal range of motion. She exhibits no edema, tenderness or deformity.   Lymphadenopathy:     She has no cervical adenopathy.   Neurological: She is alert and oriented to person, place, and time. She has normal reflexes. She displays normal reflexes. No cranial nerve deficit or sensory deficit. She exhibits normal muscle tone. Coordination normal.   Skin: Skin is warm and dry. No rash noted. She is not diaphoretic. No erythema. No pallor.   Nursing note and vitals reviewed.      Assessment/Plan   Problem List Items Addressed This Visit        Digestive    Vitamin D deficiency - Primary    Relevant Orders    CBC & Differential    Comprehensive Metabolic Panel    Iron    TSH    Vitamin D 25 Hydroxy    Vitamin B12       Endocrine    Acquired hypothyroidism           New Medications Ordered This Visit   Medications   • mupirocin (BACTROBAN) 2 % ointment     Sig: Apply  topically to the appropriate area  as directed 3 (Three) Times a Day.     Dispense:  30 g     Refill:  1      rx for bactroban to use on groin lesions as needed

## 2019-03-15 DIAGNOSIS — E03.9 HYPOTHYROIDISM, UNSPECIFIED TYPE: Primary | ICD-10-CM

## 2019-03-15 RX ORDER — LEVOTHYROXINE SODIUM 0.12 MG/1
125 TABLET ORAL DAILY
Qty: 30 TABLET | Refills: 5 | Status: SHIPPED | OUTPATIENT
Start: 2019-03-15 | End: 2019-10-21 | Stop reason: SDUPTHER

## 2019-05-03 ENCOUNTER — APPOINTMENT (OUTPATIENT)
Dept: LAB | Facility: HOSPITAL | Age: 24
End: 2019-05-03

## 2019-05-03 ENCOUNTER — OFFICE VISIT (OUTPATIENT)
Dept: ENDOCRINOLOGY | Facility: CLINIC | Age: 24
End: 2019-05-03

## 2019-05-03 VITALS
SYSTOLIC BLOOD PRESSURE: 124 MMHG | HEART RATE: 102 BPM | HEIGHT: 67 IN | WEIGHT: 144 LBS | BODY MASS INDEX: 22.6 KG/M2 | DIASTOLIC BLOOD PRESSURE: 70 MMHG

## 2019-05-03 DIAGNOSIS — E03.9 ACQUIRED HYPOTHYROIDISM: Primary | ICD-10-CM

## 2019-05-03 DIAGNOSIS — E55.9 VITAMIN D DEFICIENCY: ICD-10-CM

## 2019-05-03 LAB
ALBUMIN SERPL-MCNC: 4.4 G/DL (ref 3.5–5.2)
ALBUMIN/GLOB SERPL: 1.4 G/DL
ALP SERPL-CCNC: 55 U/L (ref 39–117)
ALT SERPL W P-5'-P-CCNC: 21 U/L (ref 1–33)
ANION GAP SERPL CALCULATED.3IONS-SCNC: 11.1 MMOL/L
AST SERPL-CCNC: 19 U/L (ref 1–32)
BASOPHILS # BLD AUTO: 0.06 10*3/MM3 (ref 0–0.2)
BASOPHILS NFR BLD AUTO: 0.7 % (ref 0–1.5)
BILIRUB SERPL-MCNC: 0.4 MG/DL (ref 0.2–1.2)
BUN BLD-MCNC: 10 MG/DL (ref 6–20)
BUN/CREAT SERPL: 9.3 (ref 7–25)
CALCIUM SPEC-SCNC: 9.3 MG/DL (ref 8.6–10.5)
CHLORIDE SERPL-SCNC: 105 MMOL/L (ref 98–107)
CO2 SERPL-SCNC: 24.9 MMOL/L (ref 22–29)
CREAT BLD-MCNC: 1.07 MG/DL (ref 0.57–1)
DEPRECATED RDW RBC AUTO: 41 FL (ref 37–54)
EOSINOPHIL # BLD AUTO: 0.28 10*3/MM3 (ref 0–0.4)
EOSINOPHIL NFR BLD AUTO: 3.3 % (ref 0.3–6.2)
ERYTHROCYTE [DISTWIDTH] IN BLOOD BY AUTOMATED COUNT: 13.3 % (ref 12.3–15.4)
GFR SERPL CREATININE-BSD FRML MDRD: 62 ML/MIN/1.73
GLOBULIN UR ELPH-MCNC: 3.2 GM/DL
GLUCOSE BLD-MCNC: 84 MG/DL (ref 65–99)
HCT VFR BLD AUTO: 40.9 % (ref 34–46.6)
HGB BLD-MCNC: 13.5 G/DL (ref 12–15.9)
IMM GRANULOCYTES # BLD AUTO: 0.05 10*3/MM3 (ref 0–0.05)
IMM GRANULOCYTES NFR BLD AUTO: 0.6 % (ref 0–0.5)
LYMPHOCYTES # BLD AUTO: 1.3 10*3/MM3 (ref 0.7–3.1)
LYMPHOCYTES NFR BLD AUTO: 15.5 % (ref 19.6–45.3)
MCH RBC QN AUTO: 27.4 PG (ref 26.6–33)
MCHC RBC AUTO-ENTMCNC: 33 G/DL (ref 31.5–35.7)
MCV RBC AUTO: 83.1 FL (ref 79–97)
MONOCYTES # BLD AUTO: 0.52 10*3/MM3 (ref 0.1–0.9)
MONOCYTES NFR BLD AUTO: 6.2 % (ref 5–12)
NEUTROPHILS # BLD AUTO: 6.19 10*3/MM3 (ref 1.7–7)
NEUTROPHILS NFR BLD AUTO: 73.7 % (ref 42.7–76)
NRBC BLD AUTO-RTO: 0 /100 WBC (ref 0–0.2)
PLATELET # BLD AUTO: 262 10*3/MM3 (ref 140–450)
PMV BLD AUTO: 10.2 FL (ref 6–12)
POTASSIUM BLD-SCNC: 4.6 MMOL/L (ref 3.5–5.2)
PROT SERPL-MCNC: 7.6 G/DL (ref 6–8.5)
RBC # BLD AUTO: 4.92 10*6/MM3 (ref 3.77–5.28)
SODIUM BLD-SCNC: 141 MMOL/L (ref 136–145)
TSH SERPL DL<=0.05 MIU/L-ACNC: 2.58 MIU/ML (ref 0.27–4.2)
WBC NRBC COR # BLD: 8.4 10*3/MM3 (ref 3.4–10.8)

## 2019-05-03 PROCEDURE — 36415 COLL VENOUS BLD VENIPUNCTURE: CPT | Performed by: NURSE PRACTITIONER

## 2019-05-03 PROCEDURE — 83516 IMMUNOASSAY NONANTIBODY: CPT | Performed by: NURSE PRACTITIONER

## 2019-05-03 PROCEDURE — 80053 COMPREHEN METABOLIC PANEL: CPT | Performed by: NURSE PRACTITIONER

## 2019-05-03 PROCEDURE — 86376 MICROSOMAL ANTIBODY EACH: CPT | Performed by: NURSE PRACTITIONER

## 2019-05-03 PROCEDURE — 85025 COMPLETE CBC W/AUTO DIFF WBC: CPT | Performed by: NURSE PRACTITIONER

## 2019-05-03 PROCEDURE — 82784 ASSAY IGA/IGD/IGG/IGM EACH: CPT | Performed by: NURSE PRACTITIONER

## 2019-05-03 PROCEDURE — 84443 ASSAY THYROID STIM HORMONE: CPT | Performed by: NURSE PRACTITIONER

## 2019-05-03 PROCEDURE — 99214 OFFICE O/P EST MOD 30 MIN: CPT | Performed by: NURSE PRACTITIONER

## 2019-05-03 NOTE — PROGRESS NOTES
Referring provider Paola Obando     History       24 year old female presents for consultation     REASON --- hypothyroidism       Duration  - since 2015     Timing - constant    Quality - not controlled     Severity - mild    Context - diagnosed with routine lab work     Location/size - thyroid     No ultrasond     Quantity -       Lab Results   Component Value Date    TSH 9.530 (H) 03/13/2019         Symptoms -  Trouble having sleeping , no complaints , no dysphagia       Alleviating Factors - levothyroxine 125 mcg one daily   She takes the medication alone with no other medications or food     Aggravating Factors - none      Past Medical History:   Diagnosis Date   • Acute tonsillitis    • Bronchitis    • Cellulitis     History of abscess, near left axilla      • Contusion of knee    • Depressive disorder    • Diarrhea    • Disease of thyroid gland    • Dysmenorrhea 9/28/2017   • Dysuria    • Encounter for gynecological examination (general) (routine) without abnormal findings    • Encounter for removal of sutures    • Encounter for surveillance of contraceptive pills    • Folliculitis    • Fracture of phalanx of hand     Left thumb      • Gastroenteritis    • Generalized anxiety disorder    • Hypothyroidism, unspecified    • Irregular periods    • Kidney stone    • Knee pain    • Lipoma    • Ovarian cyst    • Pain in joint, ankle and foot    • Pain in wrist    • Panic disorder    • Ruptured cyst of ovary    • Sports physical    • Sprain of ankle    • Strain of neck muscle    • Tick bite    • Upper respiratory infection      Past Surgical History:   Procedure Laterality Date   • ADENOIDECTOMY     • INCISION AND DRAINAGE ABSCESS  02/04/2009    Left axillary abscess   • INJECTION OF MEDICATION  05/31/2014    Toradol(1)   • INJECTION OF MEDICATION  05/31/2014    Zofran(1)   • KIDNEY STONE SURGERY     • TONSILLECTOMY       Family History   Problem Relation Age of Onset   • Cancer Other    • Diabetes Other    • Heart  disease Other    • Hypertension Other    • Thyroid disease Other      Social History     Tobacco Use   • Smoking status: Never Smoker   • Smokeless tobacco: Never Used   Substance Use Topics   • Alcohol use: No   • Drug use: No           Current Outpatient Medications:   •  levothyroxine (SYNTHROID) 125 MCG tablet, Take 1 tablet by mouth Daily., Disp: 30 tablet, Rfl: 5  •  mupirocin (BACTROBAN) 2 % ointment, Apply  topically to the appropriate area as directed 3 (Three) Times a Day., Disp: 30 g, Rfl: 1  •  norethindrone-ethinyl estradiol (LOESTRIN 1/20, 21,) 1-20 MG-MCG per tablet, Take 1 tablet by mouth Daily., Disp: 21 tablet, Rfl: 12        Review of Systems   Constitutional: Negative for activity change, appetite change, diaphoresis and fatigue.   HENT: Negative for facial swelling, sneezing, sore throat, tinnitus, trouble swallowing and voice change.    Eyes: Negative for photophobia, pain, discharge, redness, itching and visual disturbance.   Respiratory: Negative for apnea, cough, choking, chest tightness and shortness of breath.    Cardiovascular: Negative for chest pain, palpitations and leg swelling.   Gastrointestinal: Negative for abdominal distention, abdominal pain, constipation, diarrhea, nausea and vomiting.   Endocrine: Negative for cold intolerance, heat intolerance, polydipsia, polyphagia and polyuria.   Genitourinary: Negative for difficulty urinating, dysuria, frequency, hematuria and urgency.   Musculoskeletal: Negative for arthralgias, back pain, gait problem, joint swelling, myalgias, neck pain and neck stiffness.   Skin: Negative for color change, pallor, rash and wound.   Neurological: Negative for dizziness, tremors, weakness, light-headedness, numbness and headaches.   Hematological: Negative for adenopathy. Does not bruise/bleed easily.   Psychiatric/Behavioral: Negative for behavioral problems, confusion and sleep disturbance.       /70 (BP Location: Left arm, Patient Position:  "Sitting, Cuff Size: Adult)   Pulse 102   Ht 170.2 cm (67\")   Wt 65.3 kg (144 lb)   BMI 22.55 kg/m²     Physical Exam   Constitutional: She is oriented to person, place, and time. She appears well-developed and well-nourished. No distress.   HENT:   Head: Normocephalic and atraumatic.   Right Ear: External ear normal.   Left Ear: External ear normal.   Nose: Nose normal.   Eyes: Conjunctivae and EOM are normal. Pupils are equal, round, and reactive to light.   Neck: Normal range of motion. Neck supple. No tracheal deviation present. No thyromegaly present.   Cardiovascular: Normal rate, regular rhythm and normal heart sounds.   No murmur heard.  Pulmonary/Chest: Effort normal and breath sounds normal. No respiratory distress. She has no wheezes.   Abdominal: Soft. Bowel sounds are normal. There is no tenderness. There is no rebound and no guarding.   Musculoskeletal: Normal range of motion. She exhibits no edema, tenderness or deformity.   Neurological: She is alert and oriented to person, place, and time. No cranial nerve deficit.   Skin: Skin is warm and dry. No rash noted.   Psychiatric: She has a normal mood and affect. Her behavior is normal. Judgment and thought content normal.           Labs    Lab Results   Component Value Date    GLUCOSE 74 03/13/2019    BUN 13 03/13/2019    CREATININE 1.20 (H) 03/13/2019    EGFRIFNONA 55 (L) 03/13/2019    BCR 10.8 03/13/2019    K 4.3 03/13/2019    CO2 28.0 03/13/2019    CALCIUM 9.7 03/13/2019    ALBUMIN 4.40 03/13/2019    AST 39 (H) 03/13/2019    ALT 37 03/13/2019       Lab Results   Component Value Date    WBC 9.87 03/13/2019    HGB 12.9 03/13/2019    HCT 38.9 03/13/2019    MCV 82.4 03/13/2019     03/13/2019       Lab Results   Component Value Date    JRYY82UY 28.3 (L) 03/13/2019       No results found for: HGBA1C          Lab Results   Component Value Date    TSH 9.530 (H) 03/13/2019    TSH 6.970 (H) 12/07/2018       Lab Results   Component Value Date    FREET4 " 1.25 12/07/2018    FREET4 1.02 11/17/2017       Lab Results   Component Value Date    H7KWGAX 142.0 12/07/2018    T4YOQIF 132.0 11/17/2017       Thyroid Peroxidase Antibodies    Lab Results   Component Value Date    THYROIDAB 194 (H) 08/24/2017       ThyroidStimulating Immunoglobulin    No results found for: LABTHYR      Assessment         ICD-10-CM ICD-9-CM   1. Acquired hypothyroidism E03.9 244.9   2. Vitamin D deficiency E55.9 268.9     Hypothyroidism not controlled  Vitamin d def.     In summary Ms. Padilla is a very pleasant 24 year old female with a history of hypothyroidism that is not controlled. She has been referred to us for further evaluation.    PLAN    Repeat TSH today and I will call     Due to variability in TSH will rule out celiac disease    We need to change to brand name synthroid due to variability in TSH     She takes the medication correctly so compliance is not an issue, will check for malabsorption with celiac panel, b12 was normal        Bone Health    Vitamin d def..    Component      Latest Ref Rng & Units 3/13/2019   25 Hydroxy, Vitamin D      30.0 - 100.0 ng/ml 28.3 (L)       Recommend to start MVI daily       Return in about 3 months (around 8/3/2019) for Recheck.      Records from Mrs. Obando received and reviewed from 2019   Thank you for this consultation

## 2019-05-04 LAB
GLIADIN PEPTIDE IGA SER-ACNC: 3 UNITS (ref 0–19)
IGA SERPL-MCNC: 88 MG/DL (ref 87–352)
THYROPEROXIDASE AB SERPL-ACNC: 124 IU/ML (ref 0–34)
TTG IGA SER-ACNC: <2 U/ML (ref 0–3)

## 2019-05-10 ENCOUNTER — TELEPHONE (OUTPATIENT)
Dept: ENDOCRINOLOGY | Facility: CLINIC | Age: 24
End: 2019-05-10

## 2019-05-10 NOTE — TELEPHONE ENCOUNTER
----- Message from MILLY Alcala sent at 5/6/2019  7:53 AM CDT -----  Let her know thyroid level is normal no change in dosage; celiac penal negative ( that is test for gluten)

## 2019-07-19 ENCOUNTER — APPOINTMENT (OUTPATIENT)
Dept: LAB | Facility: HOSPITAL | Age: 24
End: 2019-07-19

## 2019-07-19 ENCOUNTER — OFFICE VISIT (OUTPATIENT)
Dept: FAMILY MEDICINE CLINIC | Facility: CLINIC | Age: 24
End: 2019-07-19

## 2019-07-19 VITALS
SYSTOLIC BLOOD PRESSURE: 112 MMHG | HEIGHT: 67 IN | BODY MASS INDEX: 22.6 KG/M2 | WEIGHT: 144 LBS | DIASTOLIC BLOOD PRESSURE: 72 MMHG

## 2019-07-19 DIAGNOSIS — F41.9 ANXIETY: ICD-10-CM

## 2019-07-19 DIAGNOSIS — E07.89 THYROID FULLNESS: ICD-10-CM

## 2019-07-19 DIAGNOSIS — E03.9 ACQUIRED HYPOTHYROIDISM: Primary | ICD-10-CM

## 2019-07-19 DIAGNOSIS — R53.81 MALAISE: ICD-10-CM

## 2019-07-19 DIAGNOSIS — E55.9 VITAMIN D DEFICIENCY: ICD-10-CM

## 2019-07-19 PROCEDURE — 84443 ASSAY THYROID STIM HORMONE: CPT | Performed by: NURSE PRACTITIONER

## 2019-07-19 PROCEDURE — 99214 OFFICE O/P EST MOD 30 MIN: CPT | Performed by: NURSE PRACTITIONER

## 2019-07-19 PROCEDURE — 36415 COLL VENOUS BLD VENIPUNCTURE: CPT | Performed by: NURSE PRACTITIONER

## 2019-07-19 PROCEDURE — 86376 MICROSOMAL ANTIBODY EACH: CPT | Performed by: NURSE PRACTITIONER

## 2019-07-19 PROCEDURE — 82306 VITAMIN D 25 HYDROXY: CPT | Performed by: NURSE PRACTITIONER

## 2019-07-19 PROCEDURE — 85025 COMPLETE CBC W/AUTO DIFF WBC: CPT | Performed by: NURSE PRACTITIONER

## 2019-07-19 PROCEDURE — 80053 COMPREHEN METABOLIC PANEL: CPT | Performed by: NURSE PRACTITIONER

## 2019-07-19 PROCEDURE — 83540 ASSAY OF IRON: CPT | Performed by: NURSE PRACTITIONER

## 2019-07-19 NOTE — PROGRESS NOTES
Chief Complaint   Patient presents with   • Hypothyroidism     4 month check up      Subjective   Lala Padilla is a 24 y.o. female.     Hypothyroidism   This is a chronic problem. The current episode started more than 1 year ago. The problem has been waxing and waning. Associated symptoms include fatigue. Pertinent negatives include no abdominal pain, arthralgias, change in bowel habit, chest pain, chills, diaphoresis, fever, headaches, joint swelling, myalgias, nausea, neck pain, numbness, sore throat, swollen glands, urinary symptoms, vertigo, visual change, vomiting or weakness. The treatment provided mild relief.   Fatigue   This is a recurrent problem. The current episode started 1 to 4 weeks ago. The problem occurs every several days. The problem has been gradually worsening. Associated symptoms include fatigue. Pertinent negatives include no abdominal pain, arthralgias, change in bowel habit, chest pain, chills, diaphoresis, fever, headaches, joint swelling, myalgias, nausea, neck pain, numbness, sore throat, swollen glands, urinary symptoms, vertigo, visual change, vomiting or weakness. She has tried acetaminophen for the symptoms. The treatment provided mild relief.   Thyroid Problem   Presents for follow-up visit. Symptoms include depressed mood, dry skin and fatigue. Patient reports no anxiety, cold intolerance, constipation, diaphoresis, hair loss, heat intolerance, hoarse voice, leg swelling, menstrual problem, nail problem, palpitations, tremors, visual change, weight gain or weight loss. The symptoms have been stable.   Anxiety   Presents for follow-up visit. Symptoms include depressed mood, excessive worry and panic. Patient reports no chest pain, compulsions, confusion, decreased concentration, dizziness, dry mouth, feeling of choking, hyperventilation, impotence, insomnia, irritability, malaise, muscle tension, nausea, nervous/anxious behavior, obsessions, palpitations,  restlessness, shortness of breath or suicidal ideas. Symptoms occur most days. The quality of sleep is good. Nighttime awakenings: none.     Compliance with medications is %.        The following portions of the patient's history were reviewed and updated as appropriate: allergies, current medications, past social history and problem list.    Review of Systems   Constitutional: Positive for activity change and fatigue. Negative for appetite change, chills, diaphoresis, fever, irritability, unexpected weight change, weight gain and weight loss.   HENT: Negative for dental problem, drooling, ear discharge, ear pain, facial swelling, hoarse voice, postnasal drip and sore throat.         Reports trouble swallowing on and off    Eyes: Negative.  Negative for photophobia, discharge, itching and visual disturbance.   Respiratory: Negative.  Negative for apnea, choking, chest tightness, shortness of breath and wheezing.    Cardiovascular: Negative.  Negative for chest pain, palpitations and leg swelling.   Gastrointestinal: Negative.  Negative for abdominal distention, abdominal pain, anal bleeding, blood in stool, change in bowel habit, constipation, nausea, rectal pain and vomiting.   Endocrine: Negative for cold intolerance and heat intolerance.   Genitourinary: Negative.  Negative for dysuria, genital sores, hematuria, impotence and menstrual problem.   Musculoskeletal: Negative.  Negative for arthralgias, back pain, gait problem, joint swelling, myalgias, neck pain and neck stiffness.   Skin: Negative.  Negative for color change and pallor.   Allergic/Immunologic: Negative.  Negative for environmental allergies, food allergies and immunocompromised state.   Neurological: Negative.  Negative for dizziness, vertigo, tremors, seizures, syncope, speech difficulty, weakness, light-headedness, numbness and headaches.   Hematological: Negative.  Negative for adenopathy. Does not bruise/bleed easily.  "  Psychiatric/Behavioral: Negative for agitation, behavioral problems, confusion, decreased concentration, dysphoric mood, hallucinations, self-injury, sleep disturbance and suicidal ideas. The patient is not nervous/anxious, does not have insomnia and is not hyperactive.         Symptoms improved on meds       Objective   /72   Ht 170.2 cm (67\")   Wt 65.3 kg (144 lb)   BMI 22.55 kg/m²   Physical Exam   Constitutional: She is oriented to person, place, and time. She appears well-developed and well-nourished. No distress.   HENT:   Head: Normocephalic and atraumatic.   Right Ear: External ear normal.   Left Ear: External ear normal.   Nose: Nose normal.   Mouth/Throat: No oropharyngeal exudate.   Thyroid fullness right side worse than left   Eyes: Conjunctivae and EOM are normal. Pupils are equal, round, and reactive to light. Right eye exhibits no discharge. Left eye exhibits no discharge. No scleral icterus.   Neck: Normal range of motion. Neck supple. No JVD present. No tracheal deviation present. No thyromegaly present.   Cardiovascular: Normal rate, regular rhythm, normal heart sounds and intact distal pulses. Exam reveals no gallop and no friction rub.   No murmur heard.  Pulmonary/Chest: Effort normal and breath sounds normal. No stridor. No respiratory distress. She has no wheezes. She has no rales. She exhibits no tenderness.   Abdominal: Soft. Bowel sounds are normal. She exhibits no distension and no mass. There is no tenderness. There is no rebound and no guarding. No hernia.   Musculoskeletal: Normal range of motion. She exhibits no edema, tenderness or deformity.   Lymphadenopathy:     She has no cervical adenopathy.   Neurological: She is alert and oriented to person, place, and time. She has normal reflexes. She displays normal reflexes. No cranial nerve deficit or sensory deficit. She exhibits normal muscle tone. Coordination normal.   Skin: Skin is warm and dry. No rash noted. She is not " diaphoretic. No erythema. No pallor.   Nursing note and vitals reviewed.      Assessment/Plan   Problem List Items Addressed This Visit        Digestive    Vitamin D deficiency    Relevant Orders    CBC & Differential    Comprehensive Metabolic Panel    TSH    Iron    Vitamin D 25 Hydroxy    CBC Auto Differential       Endocrine    Thyroid fullness    Relevant Orders    US Thyroid    Acquired hypothyroidism - Primary    Relevant Orders    CBC & Differential    Comprehensive Metabolic Panel    TSH    Iron    Vitamin D 25 Hydroxy    Thyroid Peroxidase Antibody    US Thyroid    CBC Auto Differential       Other    Anxiety    Relevant Orders    Ambulatory Referral to Behavioral Health (Completed)    Malaise    Relevant Orders    CBC & Differential    Comprehensive Metabolic Panel    TSH    Iron    Vitamin D 25 Hydroxy    CBC Auto Differential         No orders of the defined types were placed in this encounter.      It's not just what you eat, but when you eat  Eat breakfast, and eat smaller meals throughout the day. A healthy breakfast can jumpstart your metabolism, while eating small, healthy meals (rather than the standard three large meals) keeps your energy up.   Avoid eating at night. Try to eat dinner earlier and fast for 14-16 hours until breakfast the next morning. Studies suggest that eating only when you’re most active and giving your digestive system a long break each day may help to regulate weight.     Labs today, refer to mental health for evaluation-us of thyroid as directed

## 2019-07-20 LAB
25(OH)D3 SERPL-MCNC: 35.8 NG/ML (ref 30–100)
ALBUMIN SERPL-MCNC: 4.2 G/DL (ref 3.5–5.2)
ALBUMIN/GLOB SERPL: 1.4 G/DL
ALP SERPL-CCNC: 59 U/L (ref 39–117)
ALT SERPL W P-5'-P-CCNC: 24 U/L (ref 1–33)
ANION GAP SERPL CALCULATED.3IONS-SCNC: 13.9 MMOL/L (ref 5–15)
AST SERPL-CCNC: 23 U/L (ref 1–32)
BASOPHILS # BLD AUTO: 0.02 10*3/MM3 (ref 0–0.2)
BASOPHILS NFR BLD AUTO: 0.2 % (ref 0–1.5)
BILIRUB SERPL-MCNC: 0.2 MG/DL (ref 0.2–1.2)
BUN BLD-MCNC: 12 MG/DL (ref 6–20)
BUN/CREAT SERPL: 11.2 (ref 7–25)
CALCIUM SPEC-SCNC: 10 MG/DL (ref 8.6–10.5)
CHLORIDE SERPL-SCNC: 99 MMOL/L (ref 98–107)
CO2 SERPL-SCNC: 22.1 MMOL/L (ref 22–29)
CREAT BLD-MCNC: 1.07 MG/DL (ref 0.57–1)
DEPRECATED RDW RBC AUTO: 38 FL (ref 37–54)
EOSINOPHIL # BLD AUTO: 0.23 10*3/MM3 (ref 0–0.4)
EOSINOPHIL NFR BLD AUTO: 2.8 % (ref 0.3–6.2)
ERYTHROCYTE [DISTWIDTH] IN BLOOD BY AUTOMATED COUNT: 13 % (ref 12.3–15.4)
GFR SERPL CREATININE-BSD FRML MDRD: 62 ML/MIN/1.73
GLOBULIN UR ELPH-MCNC: 3 GM/DL
GLUCOSE BLD-MCNC: 75 MG/DL (ref 65–99)
HCT VFR BLD AUTO: 38.6 % (ref 34–46.6)
HGB BLD-MCNC: 12.8 G/DL (ref 12–15.9)
IMM GRANULOCYTES # BLD AUTO: 0.03 10*3/MM3 (ref 0–0.05)
IMM GRANULOCYTES NFR BLD AUTO: 0.4 % (ref 0–0.5)
IRON 24H UR-MRATE: 56 MCG/DL (ref 37–145)
LYMPHOCYTES # BLD AUTO: 1.65 10*3/MM3 (ref 0.7–3.1)
LYMPHOCYTES NFR BLD AUTO: 20.2 % (ref 19.6–45.3)
MCH RBC QN AUTO: 26.8 PG (ref 26.6–33)
MCHC RBC AUTO-ENTMCNC: 33.2 G/DL (ref 31.5–35.7)
MCV RBC AUTO: 80.9 FL (ref 79–97)
MONOCYTES # BLD AUTO: 0.58 10*3/MM3 (ref 0.1–0.9)
MONOCYTES NFR BLD AUTO: 7.1 % (ref 5–12)
NEUTROPHILS # BLD AUTO: 5.64 10*3/MM3 (ref 1.7–7)
NEUTROPHILS NFR BLD AUTO: 69.3 % (ref 42.7–76)
NRBC BLD AUTO-RTO: 0 /100 WBC (ref 0–0.2)
PLATELET # BLD AUTO: 300 10*3/MM3 (ref 140–450)
PMV BLD AUTO: 10 FL (ref 6–12)
POTASSIUM BLD-SCNC: 4.2 MMOL/L (ref 3.5–5.2)
PROT SERPL-MCNC: 7.2 G/DL (ref 6–8.5)
RBC # BLD AUTO: 4.77 10*6/MM3 (ref 3.77–5.28)
SODIUM BLD-SCNC: 135 MMOL/L (ref 136–145)
TSH SERPL DL<=0.05 MIU/L-ACNC: 1.74 MIU/ML (ref 0.27–4.2)
WBC NRBC COR # BLD: 8.15 10*3/MM3 (ref 3.4–10.8)

## 2019-07-21 LAB — THYROPEROXIDASE AB SERPL-ACNC: 105 IU/ML (ref 0–34)

## 2019-07-26 ENCOUNTER — OFFICE VISIT (OUTPATIENT)
Dept: ENDOCRINOLOGY | Facility: CLINIC | Age: 24
End: 2019-07-26

## 2019-07-26 ENCOUNTER — HOSPITAL ENCOUNTER (OUTPATIENT)
Dept: ULTRASOUND IMAGING | Facility: HOSPITAL | Age: 24
Discharge: HOME | End: 2019-07-26
Admitting: NURSE PRACTITIONER

## 2019-07-26 VITALS
SYSTOLIC BLOOD PRESSURE: 118 MMHG | WEIGHT: 146 LBS | HEIGHT: 67 IN | BODY MASS INDEX: 22.91 KG/M2 | DIASTOLIC BLOOD PRESSURE: 84 MMHG | HEART RATE: 90 BPM

## 2019-07-26 DIAGNOSIS — E55.9 VITAMIN D DEFICIENCY: ICD-10-CM

## 2019-07-26 DIAGNOSIS — E06.3 HASHIMOTO'S THYROIDITIS: Primary | ICD-10-CM

## 2019-07-26 PROCEDURE — 76536 US EXAM OF HEAD AND NECK: CPT

## 2019-07-26 PROCEDURE — 99214 OFFICE O/P EST MOD 30 MIN: CPT | Performed by: NURSE PRACTITIONER

## 2019-07-26 NOTE — PROGRESS NOTES
Subjective    Lala Padilla is a 24 y.o. female. she is here today for follow-up.    History of Present Illness       History         24 year old female presents for follow up thyroid        REASON --- hypothyroidism         Duration  - since 2015      Timing - constant     Quality - not controlled      Severity - mild     Context - diagnosed with routine lab work      Location/size - thyroid      THYROID ULTRASOUND     HISTORY: Hypothyroidism. Acquired hypothyroidism.     The thyroid was scanned with a small parts transducer.     COMPARISON: None     FINDINGS:  The right lobe measures 4.23 cm in length by 1.13 cm AP by 1.23  cm transverse.  The left lobe measures 4.30 cm in length by 1.28 cm AP by 1.14 cm  transverse.  Unremarkable isthmus.     Thyroid of inhomogeneous echotexture.  No focal nodule or mass.     IMPRESSION:  CONCLUSION:  Heterogeneous thyroid which could represent thyroiditis.  No focal nodule or mass.     [id number]     Electronically signed by:  Jm Lee MD  7/26/2019 1:35 PM CDT  Workstation: [id number]       Quantity -               Lab Results   Component Value Date     TSH 9.530 (H) 03/13/2019            Lab Results   Component Value Date    TSH 1.740 07/19/2019       Symptoms -  Trouble having sleeping , no complaints , no dysphagia         Alleviating Factors - levothyroxine 125 mcg one daily   She takes the medication alone with no other medications or food      Aggravating Factors - none                     Evaluation history:  TSH   Date Value Ref Range Status   07/19/2019 1.740 0.270 - 4.200 mIU/mL Final     Free T4   Date Value Ref Range Status   12/07/2018 1.25 0.78 - 2.19 ng/dL Final       Current medications:  Current Outpatient Medications   Medication Sig Dispense Refill   • levothyroxine (SYNTHROID) 125 MCG tablet Take 1 tablet by mouth Daily. 30 tablet 5   • norethindrone-ethinyl estradiol (LOESTRIN 1/20, 21,) 1-20 MG-MCG per tablet Take 1 tablet by mouth Daily. 21  tablet 12     No current facility-administered medications for this visit.        The following portions of the patient's history were reviewed and updated as appropriate:   Past Medical History:   Diagnosis Date   • Acute tonsillitis    • Bronchitis    • Cellulitis     History of abscess, near left axilla      • Contusion of knee    • Depressive disorder    • Diarrhea    • Disease of thyroid gland    • Dysmenorrhea 9/28/2017   • Dysuria    • Encounter for gynecological examination (general) (routine) without abnormal findings    • Encounter for removal of sutures    • Encounter for surveillance of contraceptive pills    • Folliculitis    • Fracture of phalanx of hand     Left thumb      • Gastroenteritis    • Generalized anxiety disorder    • Hypothyroidism, unspecified    • Irregular periods    • Kidney stone    • Knee pain    • Lipoma    • Ovarian cyst    • Pain in joint, ankle and foot    • Pain in wrist    • Panic disorder    • Ruptured cyst of ovary    • Sports physical    • Sprain of ankle    • Strain of neck muscle    • Tick bite    • Upper respiratory infection      Past Surgical History:   Procedure Laterality Date   • ADENOIDECTOMY     • INCISION AND DRAINAGE ABSCESS  02/04/2009    Left axillary abscess   • INJECTION OF MEDICATION  05/31/2014    Toradol(1)   • INJECTION OF MEDICATION  05/31/2014    Zofran(1)   • KIDNEY STONE SURGERY     • TONSILLECTOMY       Family History   Problem Relation Age of Onset   • Cancer Other    • Diabetes Other    • Heart disease Other    • Hypertension Other    • Thyroid disease Other      OB History     No data available        No Known Allergies  Social History     Socioeconomic History   • Marital status: Single     Spouse name: Not on file   • Number of children: Not on file   • Years of education: Not on file   • Highest education level: Not on file   Tobacco Use   • Smoking status: Never Smoker   • Smokeless tobacco: Never Used   Substance and Sexual Activity   •  "Alcohol use: No   • Drug use: No   • Sexual activity: Not Currently     Birth control/protection: None       Review of Systems  Review of Systems   Constitutional: Negative for activity change, appetite change, diaphoresis and fatigue.   HENT: Negative for facial swelling, sneezing, sore throat, tinnitus, trouble swallowing and voice change.    Eyes: Negative for photophobia, pain, discharge, redness, itching and visual disturbance.   Respiratory: Negative for apnea, cough, choking, chest tightness and shortness of breath.    Cardiovascular: Negative for chest pain, palpitations and leg swelling.   Gastrointestinal: Negative for abdominal distention, abdominal pain, constipation, diarrhea, nausea and vomiting.   Endocrine: Negative for cold intolerance, heat intolerance, polydipsia, polyphagia and polyuria.   Genitourinary: Negative for difficulty urinating, dysuria, frequency, hematuria and urgency.   Musculoskeletal: Negative for arthralgias, back pain, gait problem, joint swelling, myalgias, neck pain and neck stiffness.   Skin: Negative for color change, pallor, rash and wound.   Neurological: Negative for dizziness, tremors, weakness, light-headedness, numbness and headaches.   Hematological: Negative for adenopathy. Does not bruise/bleed easily.   Psychiatric/Behavioral: Negative for behavioral problems, confusion and sleep disturbance.        Objective    /84 (BP Location: Right arm, Patient Position: Sitting, Cuff Size: Adult)   Pulse 90   Ht 170.2 cm (67\")   Wt 66.2 kg (146 lb)   BMI 22.87 kg/m²   Physical Exam   Constitutional: She is oriented to person, place, and time. She appears well-developed and well-nourished. No distress.   HENT:   Head: Normocephalic and atraumatic.   Right Ear: External ear normal.   Left Ear: External ear normal.   Nose: Nose normal.   Eyes: Conjunctivae and EOM are normal. Pupils are equal, round, and reactive to light.   Neck: Normal range of motion. Neck supple. No " tracheal deviation present. No thyromegaly present.   Cardiovascular: Normal rate, regular rhythm and normal heart sounds.   No murmur heard.  Pulmonary/Chest: Effort normal and breath sounds normal. No respiratory distress. She has no wheezes.   Abdominal: Soft. Bowel sounds are normal. There is no tenderness. There is no rebound and no guarding.   Musculoskeletal: Normal range of motion. She exhibits no edema, tenderness or deformity.   Neurological: She is alert and oriented to person, place, and time. No cranial nerve deficit.   Skin: Skin is warm and dry. No rash noted.   Psychiatric: She has a normal mood and affect. Her behavior is normal. Judgment and thought content normal.       Lab Review  Lab Results   Component Value Date    TSH 1.740 07/19/2019     Lab Results   Component Value Date    FREET4 1.25 12/07/2018        Assessment/Plan      1. Hashimoto's thyroiditis    2. Vitamin D deficiency    . This diagnosis was discussed and reviewed with the patient including the advantages of drug therapy.     1. Orders placed during this encounter include:  Orders Placed This Encounter   Procedures   • TSH       Medications prescribed:  Outpatient Encounter Medications as of 7/26/2019   Medication Sig Dispense Refill   • levothyroxine (SYNTHROID) 125 MCG tablet Take 1 tablet by mouth Daily. 30 tablet 5   • norethindrone-ethinyl estradiol (LOESTRIN 1/20, 21,) 1-20 MG-MCG per tablet Take 1 tablet by mouth Daily. 21 tablet 12     No facility-administered encounter medications on file as of 7/26/2019.      Hypothyroidism due to hashimoto's   Vitamin d def.            Component      Latest Ref Rng & Units 5/3/2019   TSH Baseline      0.270 - 4.200 mIU/mL 2.580           Lab Results   Component Value Date    TSH 1.740 07/19/2019                        Bone Health     Vitamin d def..     Component      Latest Ref Rng & Units 3/13/2019   25 Hydroxy, Vitamin D      30.0 - 100.0 ng/ml 28.3 (L)         Recommend to start MVI  daily      Component      Latest Ref Rng & Units 7/19/2019   25 Hydroxy, Vitamin D      30.0 - 100.0 ng/ml 35.8          4. Return in about 6 months (around 1/26/2020) for Recheck.

## 2019-07-31 ENCOUNTER — TELEPHONE (OUTPATIENT)
Dept: FAMILY MEDICINE CLINIC | Facility: CLINIC | Age: 24
End: 2019-07-31

## 2019-07-31 NOTE — PROGRESS NOTES
Per MILLY Ratliff, Ms. Padilla has been called with recent Thyroid Ultrasound results & recommendations.  Continue current medications and follow-up as planned or sooner if any problems.

## 2019-07-31 NOTE — TELEPHONE ENCOUNTER
Per MILLY Ratliff, Ms. Padilla has been called with recent Thyroid Ultrasound results & recommendations.  Continue current medications and follow-up as planned or sooner if any problems.      ----- Message from MILLY Keen sent at 7/29/2019  7:45 AM CDT -----  Can you let her know no nodules just enlarged thyroid-she is already being treated for this.

## 2019-08-21 DIAGNOSIS — N28.9 ABNORMAL KIDNEY FUNCTION: Primary | ICD-10-CM

## 2019-10-09 ENCOUNTER — APPOINTMENT (OUTPATIENT)
Dept: LAB | Facility: HOSPITAL | Age: 24
End: 2019-10-09

## 2019-10-09 ENCOUNTER — TRANSCRIBE ORDERS (OUTPATIENT)
Dept: LAB | Facility: HOSPITAL | Age: 24
End: 2019-10-09

## 2019-10-09 ENCOUNTER — HOSPITAL ENCOUNTER (OUTPATIENT)
Dept: ULTRASOUND IMAGING | Facility: HOSPITAL | Age: 24
Discharge: HOME | End: 2019-10-09
Admitting: INTERNAL MEDICINE

## 2019-10-09 DIAGNOSIS — N28.9 DISORDER OF KIDNEY AND URETER: ICD-10-CM

## 2019-10-09 DIAGNOSIS — N28.9 DISORDER OF KIDNEY AND URETER: Primary | ICD-10-CM

## 2019-10-09 LAB
ALBUMIN SERPL-MCNC: 4.5 G/DL (ref 3.5–5.2)
ALBUMIN UR-MCNC: <1.2 MG/DL
ANION GAP SERPL CALCULATED.3IONS-SCNC: 12 MMOL/L (ref 5–15)
BUN BLD-MCNC: 8 MG/DL (ref 6–20)
BUN/CREAT SERPL: 8.6 (ref 7–25)
CALCIUM SPEC-SCNC: 9.6 MG/DL (ref 8.6–10.5)
CHLORIDE SERPL-SCNC: 99 MMOL/L (ref 98–107)
CO2 SERPL-SCNC: 27 MMOL/L (ref 22–29)
CREAT BLD-MCNC: 0.93 MG/DL (ref 0.57–1)
CREAT UR-MCNC: 17.9 MG/DL
GFR SERPL CREATININE-BSD FRML MDRD: 73 ML/MIN/1.73
GLUCOSE BLD-MCNC: 83 MG/DL (ref 65–99)
MICROALBUMIN/CREAT UR: NORMAL MG/G{CREAT}
PHOSPHATE SERPL-MCNC: 2.9 MG/DL (ref 2.5–4.5)
POTASSIUM BLD-SCNC: 4 MMOL/L (ref 3.5–5.2)
PTH-INTACT SERPL-MCNC: 35 PG/ML (ref 15–65)
SODIUM BLD-SCNC: 138 MMOL/L (ref 136–145)
URATE SERPL-MCNC: 3.6 MG/DL (ref 2.4–5.7)

## 2019-10-09 PROCEDURE — 36415 COLL VENOUS BLD VENIPUNCTURE: CPT | Performed by: INTERNAL MEDICINE

## 2019-10-09 PROCEDURE — 82570 ASSAY OF URINE CREATININE: CPT | Performed by: INTERNAL MEDICINE

## 2019-10-09 PROCEDURE — 80069 RENAL FUNCTION PANEL: CPT | Performed by: INTERNAL MEDICINE

## 2019-10-09 PROCEDURE — 84550 ASSAY OF BLOOD/URIC ACID: CPT | Performed by: INTERNAL MEDICINE

## 2019-10-09 PROCEDURE — 76775 US EXAM ABDO BACK WALL LIM: CPT

## 2019-10-09 PROCEDURE — 83970 ASSAY OF PARATHORMONE: CPT | Performed by: INTERNAL MEDICINE

## 2019-10-09 PROCEDURE — 82043 UR ALBUMIN QUANTITATIVE: CPT | Performed by: INTERNAL MEDICINE

## 2019-10-22 RX ORDER — LEVOTHYROXINE SODIUM 0.12 MG/1
125 TABLET ORAL DAILY
Qty: 30 TABLET | Refills: 5 | Status: SHIPPED | OUTPATIENT
Start: 2019-10-22 | End: 2019-11-21 | Stop reason: SDUPTHER

## 2019-11-18 ENCOUNTER — OFFICE VISIT (OUTPATIENT)
Dept: FAMILY MEDICINE CLINIC | Facility: CLINIC | Age: 24
End: 2019-11-18

## 2019-11-18 ENCOUNTER — APPOINTMENT (OUTPATIENT)
Dept: LAB | Facility: HOSPITAL | Age: 24
End: 2019-11-18

## 2019-11-18 VITALS
BODY MASS INDEX: 21.66 KG/M2 | WEIGHT: 138 LBS | DIASTOLIC BLOOD PRESSURE: 78 MMHG | HEIGHT: 67 IN | SYSTOLIC BLOOD PRESSURE: 120 MMHG

## 2019-11-18 DIAGNOSIS — M25.512 ACUTE PAIN OF LEFT SHOULDER: ICD-10-CM

## 2019-11-18 DIAGNOSIS — F41.9 ANXIETY: ICD-10-CM

## 2019-11-18 DIAGNOSIS — E03.9 ACQUIRED HYPOTHYROIDISM: Primary | ICD-10-CM

## 2019-11-18 PROCEDURE — 36415 COLL VENOUS BLD VENIPUNCTURE: CPT | Performed by: NURSE PRACTITIONER

## 2019-11-18 PROCEDURE — 85025 COMPLETE CBC W/AUTO DIFF WBC: CPT | Performed by: NURSE PRACTITIONER

## 2019-11-18 PROCEDURE — 84443 ASSAY THYROID STIM HORMONE: CPT | Performed by: NURSE PRACTITIONER

## 2019-11-18 PROCEDURE — 83540 ASSAY OF IRON: CPT | Performed by: NURSE PRACTITIONER

## 2019-11-18 PROCEDURE — 82306 VITAMIN D 25 HYDROXY: CPT | Performed by: NURSE PRACTITIONER

## 2019-11-18 PROCEDURE — 82607 VITAMIN B-12: CPT | Performed by: NURSE PRACTITIONER

## 2019-11-18 PROCEDURE — 80053 COMPREHEN METABOLIC PANEL: CPT | Performed by: NURSE PRACTITIONER

## 2019-11-18 PROCEDURE — 99214 OFFICE O/P EST MOD 30 MIN: CPT | Performed by: NURSE PRACTITIONER

## 2019-11-18 PROCEDURE — 83735 ASSAY OF MAGNESIUM: CPT | Performed by: NURSE PRACTITIONER

## 2019-11-18 RX ORDER — BACLOFEN 10 MG/1
10 TABLET ORAL 2 TIMES DAILY
Qty: 60 TABLET | Refills: 4 | Status: SHIPPED | OUTPATIENT
Start: 2019-11-18 | End: 2020-10-29

## 2019-11-18 RX ORDER — BUPROPION HYDROCHLORIDE 150 MG/1
150 TABLET ORAL DAILY
Refills: 2 | COMMUNITY
Start: 2019-11-07 | End: 2020-02-17

## 2019-11-18 NOTE — PROGRESS NOTES
Chief Complaint   Patient presents with   • Hypothyroidism     4 month check up      Subjective   Lala Padilla is a 24 y.o. female.     Hypothyroidism   This is a chronic problem. The current episode started more than 1 year ago. The problem has been waxing and waning. Associated symptoms include arthralgias, fatigue, joint swelling and myalgias. Pertinent negatives include no abdominal pain, anorexia, change in bowel habit, chest pain, chills, congestion, coughing, diaphoresis, fever, headaches, nausea, neck pain, numbness, rash, sore throat, swollen glands, urinary symptoms, vertigo, visual change, vomiting or weakness. The treatment provided mild relief.   Fatigue   This is a recurrent problem. The current episode started 1 to 4 weeks ago. The problem occurs every several days. The problem has been gradually worsening. Associated symptoms include arthralgias, fatigue, joint swelling and myalgias. Pertinent negatives include no abdominal pain, anorexia, change in bowel habit, chest pain, chills, congestion, coughing, diaphoresis, fever, headaches, nausea, neck pain, numbness, rash, sore throat, swollen glands, urinary symptoms, vertigo, visual change, vomiting or weakness. She has tried acetaminophen for the symptoms. The treatment provided mild relief.   Thyroid Problem   Presents for follow-up visit. Symptoms include depressed mood, dry skin and fatigue. Patient reports no anxiety, cold intolerance, constipation, diaphoresis, diarrhea, hair loss, heat intolerance, hoarse voice, leg swelling, menstrual problem, nail problem, palpitations, tremors, visual change, weight gain or weight loss. The symptoms have been stable.   Anxiety   Presents for follow-up visit. Symptoms include depressed mood, excessive worry and panic. Patient reports no chest pain, compulsions, confusion, decreased concentration, dizziness, dry mouth, feeling of choking, hyperventilation, impotence, insomnia, irritability,  malaise, muscle tension, nausea, nervous/anxious behavior, obsessions, palpitations, restlessness, shortness of breath or suicidal ideas. Symptoms occur most days. The quality of sleep is good. Nighttime awakenings: none.     Compliance with medications is %.   Upper Extremity Issue   This is a new problem. The current episode started 1 to 4 weeks ago. The problem occurs intermittently. The problem has been gradually worsening. Associated symptoms include arthralgias, fatigue, joint swelling and myalgias. Pertinent negatives include no abdominal pain, anorexia, change in bowel habit, chest pain, chills, congestion, coughing, diaphoresis, fever, headaches, nausea, neck pain, numbness, rash, sore throat, swollen glands, urinary symptoms, vertigo, visual change, vomiting or weakness. She has tried NSAIDs and acetaminophen for the symptoms. The treatment provided mild relief.        The following portions of the patient's history were reviewed and updated as appropriate: allergies, current medications, past social history and problem list.    Review of Systems   Constitutional: Positive for activity change and fatigue. Negative for appetite change, chills, diaphoresis, fever, irritability, unexpected weight change, weight gain and weight loss.   HENT: Negative for congestion, dental problem, drooling, ear discharge, ear pain, facial swelling, hearing loss, hoarse voice, postnasal drip, rhinorrhea, sinus pain and sore throat.         Reports trouble swallowing on and off    Eyes: Negative.  Negative for photophobia, pain, discharge, redness, itching and visual disturbance.   Respiratory: Negative.  Negative for apnea, cough, choking, chest tightness, shortness of breath, wheezing and stridor.    Cardiovascular: Negative.  Negative for chest pain, palpitations and leg swelling.   Gastrointestinal: Negative.  Negative for abdominal distention, abdominal pain, anal bleeding, anorexia, blood in stool, change in bowel  "habit, constipation, diarrhea, nausea, rectal pain and vomiting.   Endocrine: Negative for cold intolerance, heat intolerance, polydipsia, polyphagia and polyuria.   Genitourinary: Negative.  Negative for dysuria, genital sores, hematuria, impotence and menstrual problem.   Musculoskeletal: Positive for arthralgias, back pain, gait problem, joint swelling and myalgias. Negative for neck pain and neck stiffness.   Skin: Negative.  Negative for color change, pallor and rash.   Allergic/Immunologic: Negative.  Negative for environmental allergies, food allergies and immunocompromised state.   Neurological: Negative for dizziness, vertigo, tremors, seizures, syncope, speech difficulty, weakness, light-headedness, numbness and headaches.   Hematological: Negative.  Negative for adenopathy. Does not bruise/bleed easily.   Psychiatric/Behavioral: Positive for sleep disturbance. Negative for agitation, behavioral problems, confusion, decreased concentration, dysphoric mood, hallucinations, self-injury and suicidal ideas. The patient is not nervous/anxious, does not have insomnia and is not hyperactive.         Symptoms improved on meds       Objective   /78   Ht 170.2 cm (67\")   Wt 62.6 kg (138 lb)   BMI 21.61 kg/m²   Physical Exam   Constitutional: She is oriented to person, place, and time. She appears well-developed and well-nourished. No distress.   HENT:   Head: Normocephalic and atraumatic.   Right Ear: External ear normal.   Left Ear: External ear normal.   Nose: Nose normal.   Mouth/Throat: No oropharyngeal exudate.   Thyroid fullness right side worse than left   Eyes: Conjunctivae and EOM are normal. Pupils are equal, round, and reactive to light. Right eye exhibits no discharge. Left eye exhibits no discharge. No scleral icterus.   Neck: Normal range of motion. Neck supple. No JVD present. No tracheal deviation present. No thyromegaly present.   Cardiovascular: Normal rate, regular rhythm, normal heart " sounds and intact distal pulses. Exam reveals no gallop and no friction rub.   No murmur heard.  Pulmonary/Chest: Effort normal and breath sounds normal. No stridor. No respiratory distress. She has no wheezes. She has no rales. She exhibits no tenderness.   Abdominal: Soft. Bowel sounds are normal. She exhibits no distension and no mass. There is no tenderness. There is no rebound and no guarding. No hernia.   Musculoskeletal: Normal range of motion. She exhibits tenderness. She exhibits no edema or deformity.        Cervical back: She exhibits tenderness, bony tenderness, swelling and pain. She exhibits normal range of motion, no edema, no deformity, no laceration, no spasm and normal pulse.        Back:    Lymphadenopathy:     She has no cervical adenopathy.   Neurological: She is alert and oriented to person, place, and time. She has normal reflexes. She displays normal reflexes. No cranial nerve deficit or sensory deficit. She exhibits normal muscle tone. Coordination normal.   Skin: Skin is warm and dry. No rash noted. She is not diaphoretic. No erythema. No pallor.   Nursing note and vitals reviewed.      Assessment/Plan   Problem List Items Addressed This Visit        Endocrine    Acquired hypothyroidism - Primary    Relevant Orders    CBC & Differential    Comprehensive Metabolic Panel    Iron    Vitamin B12    Vitamin D 25 Hydroxy    Magnesium    CBC Auto Differential       Nervous and Auditory    Acute pain of left shoulder       Other    Anxiety           New Medications Ordered This Visit   Medications   • baclofen (LIORESAL) 10 MG tablet     Sig: Take 1 tablet by mouth 2 (Two) Times a Day.     Dispense:  60 tablet     Refill:  4   • diclofenac (VOLTAREN) 50 MG EC tablet     Sig: Take 1 tablet by mouth 2 (Two) Times a Day. Bid prn     Dispense:  60 tablet     Refill:  5       It's not just what you eat, but when you eat  Eat breakfast, and eat smaller meals throughout the day. A healthy breakfast can  jumpstart your metabolism, while eating small, healthy meals (rather than the standard three large meals) keeps your energy up.   Avoid eating at night. Try to eat dinner earlier and fast for 14-16 hours until breakfast the next morning. Studies suggest that eating only when you’re most active and giving your digestive system a long break each day may help to regulate weight.     meds as directed, recheck if worsen

## 2019-11-19 LAB
25(OH)D3 SERPL-MCNC: 22.1 NG/ML (ref 30–100)
ALBUMIN SERPL-MCNC: 4.4 G/DL (ref 3.5–5.2)
ALBUMIN/GLOB SERPL: 1.3 G/DL
ALP SERPL-CCNC: 78 U/L (ref 39–117)
ALT SERPL W P-5'-P-CCNC: 19 U/L (ref 1–33)
ANION GAP SERPL CALCULATED.3IONS-SCNC: 12.4 MMOL/L (ref 5–15)
AST SERPL-CCNC: 18 U/L (ref 1–32)
BASOPHILS # BLD AUTO: 0.06 10*3/MM3 (ref 0–0.2)
BASOPHILS NFR BLD AUTO: 0.6 % (ref 0–1.5)
BILIRUB SERPL-MCNC: 0.3 MG/DL (ref 0.2–1.2)
BUN BLD-MCNC: 8 MG/DL (ref 6–20)
BUN/CREAT SERPL: 8.6 (ref 7–25)
CALCIUM SPEC-SCNC: 10 MG/DL (ref 8.6–10.5)
CHLORIDE SERPL-SCNC: 102 MMOL/L (ref 98–107)
CO2 SERPL-SCNC: 25.6 MMOL/L (ref 22–29)
CREAT BLD-MCNC: 0.93 MG/DL (ref 0.57–1)
DEPRECATED RDW RBC AUTO: 39.3 FL (ref 37–54)
EOSINOPHIL # BLD AUTO: 0.38 10*3/MM3 (ref 0–0.4)
EOSINOPHIL NFR BLD AUTO: 3.6 % (ref 0.3–6.2)
ERYTHROCYTE [DISTWIDTH] IN BLOOD BY AUTOMATED COUNT: 13.5 % (ref 12.3–15.4)
GFR SERPL CREATININE-BSD FRML MDRD: 73 ML/MIN/1.73
GLOBULIN UR ELPH-MCNC: 3.5 GM/DL
GLUCOSE BLD-MCNC: 76 MG/DL (ref 65–99)
HCT VFR BLD AUTO: 41.5 % (ref 34–46.6)
HGB BLD-MCNC: 13.4 G/DL (ref 12–15.9)
IMM GRANULOCYTES # BLD AUTO: 0.03 10*3/MM3 (ref 0–0.05)
IMM GRANULOCYTES NFR BLD AUTO: 0.3 % (ref 0–0.5)
IRON 24H UR-MRATE: 26 MCG/DL (ref 37–145)
LYMPHOCYTES # BLD AUTO: 1.31 10*3/MM3 (ref 0.7–3.1)
LYMPHOCYTES NFR BLD AUTO: 12.5 % (ref 19.6–45.3)
MAGNESIUM SERPL-MCNC: 2 MG/DL (ref 1.6–2.6)
MCH RBC QN AUTO: 25.9 PG (ref 26.6–33)
MCHC RBC AUTO-ENTMCNC: 32.3 G/DL (ref 31.5–35.7)
MCV RBC AUTO: 80.3 FL (ref 79–97)
MONOCYTES # BLD AUTO: 0.76 10*3/MM3 (ref 0.1–0.9)
MONOCYTES NFR BLD AUTO: 7.2 % (ref 5–12)
NEUTROPHILS # BLD AUTO: 7.96 10*3/MM3 (ref 1.7–7)
NEUTROPHILS NFR BLD AUTO: 75.8 % (ref 42.7–76)
NRBC BLD AUTO-RTO: 0 /100 WBC (ref 0–0.2)
PLATELET # BLD AUTO: 314 10*3/MM3 (ref 140–450)
PMV BLD AUTO: 9.7 FL (ref 6–12)
POTASSIUM BLD-SCNC: 4.5 MMOL/L (ref 3.5–5.2)
PROT SERPL-MCNC: 7.9 G/DL (ref 6–8.5)
RBC # BLD AUTO: 5.17 10*6/MM3 (ref 3.77–5.28)
SODIUM BLD-SCNC: 140 MMOL/L (ref 136–145)
TSH SERPL DL<=0.05 MIU/L-ACNC: 0.02 UIU/ML (ref 0.27–4.2)
VIT B12 BLD-MCNC: 497 PG/ML (ref 211–946)
WBC NRBC COR # BLD: 10.5 10*3/MM3 (ref 3.4–10.8)

## 2019-11-22 ENCOUNTER — TELEPHONE (OUTPATIENT)
Dept: FAMILY MEDICINE CLINIC | Facility: CLINIC | Age: 24
End: 2019-11-22

## 2019-11-22 RX ORDER — VITAMIN B COMPLEX
1000 TABLET ORAL DAILY
Qty: 90 TABLET | Refills: 3 | Status: SHIPPED | OUTPATIENT
Start: 2019-11-22 | End: 2022-02-25

## 2019-11-22 RX ORDER — LEVOTHYROXINE SODIUM 0.12 MG/1
125 TABLET ORAL DAILY
Qty: 30 TABLET | Refills: 5 | Status: SHIPPED | OUTPATIENT
Start: 2019-11-22 | End: 2020-02-18 | Stop reason: DRUGHIGH

## 2019-11-22 RX ORDER — FERROUS SULFATE 325(65) MG
325 TABLET ORAL
Qty: 90 TABLET | Refills: 2 | Status: SHIPPED | OUTPATIENT
Start: 2019-11-22 | End: 2020-04-28 | Stop reason: ALTCHOICE

## 2019-11-22 NOTE — TELEPHONE ENCOUNTER
Per MILLY Ratliff, Ms. Padilla has been called with recent lab results & recommendations.  Continue current medications and follow-up as planned or sooner if any problems.    Scripts sent to her Pharmacy for the OTC meds      ----- Message from MILLY Keen sent at 11/20/2019  7:50 AM CST -----  Can you let her know labs are good except needs vitamin d and iron daily-also thyroid is a little overstimulated but this is ok -will continue her dose-make sure she gets iron and vitamin d otc-325mg iron and 1000iu vitamin d

## 2019-11-22 NOTE — PROGRESS NOTES
Per MILLY Ratliff, Ms. Padilla has been called with recent lab results & recommendations.  Continue current medications and follow-up as planned or sooner if any problems.    Scripts sent to her Pharmacy for the OTC meds

## 2019-12-19 ENCOUNTER — OFFICE VISIT (OUTPATIENT)
Dept: FAMILY MEDICINE CLINIC | Facility: CLINIC | Age: 24
End: 2019-12-19

## 2019-12-19 ENCOUNTER — APPOINTMENT (OUTPATIENT)
Dept: LAB | Facility: HOSPITAL | Age: 24
End: 2019-12-19

## 2019-12-19 VITALS
HEIGHT: 67 IN | SYSTOLIC BLOOD PRESSURE: 110 MMHG | BODY MASS INDEX: 21.35 KG/M2 | WEIGHT: 136 LBS | DIASTOLIC BLOOD PRESSURE: 82 MMHG

## 2019-12-19 DIAGNOSIS — Z23 NEED FOR VACCINATION: ICD-10-CM

## 2019-12-19 DIAGNOSIS — Z00.00 WELLNESS EXAMINATION: Primary | ICD-10-CM

## 2019-12-19 PROCEDURE — 90471 IMMUNIZATION ADMIN: CPT | Performed by: NURSE PRACTITIONER

## 2019-12-19 PROCEDURE — 86480 TB TEST CELL IMMUN MEASURE: CPT | Performed by: NURSE PRACTITIONER

## 2019-12-19 PROCEDURE — 90674 CCIIV4 VAC NO PRSV 0.5 ML IM: CPT | Performed by: NURSE PRACTITIONER

## 2019-12-19 PROCEDURE — 36415 COLL VENOUS BLD VENIPUNCTURE: CPT | Performed by: NURSE PRACTITIONER

## 2019-12-19 PROCEDURE — 99395 PREV VISIT EST AGE 18-39: CPT | Performed by: NURSE PRACTITIONER

## 2019-12-19 NOTE — PROGRESS NOTES
Chief Complaint   Patient presents with   • Will be shadowing here at the clinic     flu vaccine and Tb skin   • Medication question     Subjective   Lala Padilla is a 24 y.o. female.     Presents with shadowing at the clinic and is needing updated tb skin test and flu vaccine -doing well otherwise        The following portions of the patient's history were reviewed and updated as appropriate: allergies, current medications, past social history and problem list.    Review of Systems   Constitutional: Positive for activity change. Negative for appetite change and unexpected weight change.   HENT: Negative for dental problem, drooling, ear discharge, ear pain, facial swelling, hearing loss, postnasal drip, rhinorrhea and sinus pain.         Reports trouble swallowing on and off    Eyes: Negative.  Negative for photophobia, pain, discharge, redness, itching and visual disturbance.   Respiratory: Negative.  Negative for apnea, choking, chest tightness, wheezing and stridor.    Cardiovascular: Negative.  Negative for palpitations and leg swelling.   Gastrointestinal: Negative.  Negative for abdominal distention, anal bleeding, blood in stool, constipation, diarrhea and rectal pain.   Endocrine: Negative for heat intolerance, polydipsia, polyphagia and polyuria.   Genitourinary: Negative.  Negative for dysuria, genital sores, hematuria and menstrual problem.   Musculoskeletal: Positive for back pain and gait problem. Negative for neck stiffness.   Skin: Negative.  Negative for color change and pallor.   Allergic/Immunologic: Negative.  Negative for environmental allergies, food allergies and immunocompromised state.   Neurological: Negative for tremors, seizures, syncope, speech difficulty and light-headedness.   Hematological: Negative.  Negative for adenopathy. Does not bruise/bleed easily.   Psychiatric/Behavioral: Positive for sleep disturbance. Negative for agitation, behavioral problems, confusion,  "decreased concentration, dysphoric mood, hallucinations, self-injury and suicidal ideas. The patient is not nervous/anxious and is not hyperactive.         Symptoms improved on meds       Objective   /82   Ht 170.2 cm (67\")   Wt 61.7 kg (136 lb)   BMI 21.30 kg/m²   Physical Exam   Constitutional: She is oriented to person, place, and time. She appears well-developed and well-nourished. No distress.   HENT:   Head: Normocephalic and atraumatic.   Right Ear: External ear normal.   Left Ear: External ear normal.   Nose: Nose normal.   Mouth/Throat: No oropharyngeal exudate.   Eyes: Pupils are equal, round, and reactive to light. Conjunctivae and EOM are normal. Right eye exhibits no discharge. Left eye exhibits no discharge. No scleral icterus.   Neck: Normal range of motion. Neck supple. No JVD present. No tracheal deviation present. No thyromegaly present.   Cardiovascular: Normal rate, regular rhythm, normal heart sounds and intact distal pulses. Exam reveals no gallop and no friction rub.   No murmur heard.  Pulmonary/Chest: Effort normal and breath sounds normal. No stridor. No respiratory distress. She has no wheezes. She has no rales. She exhibits no tenderness.   Abdominal: Soft. Bowel sounds are normal. She exhibits no distension and no mass. There is no tenderness. There is no rebound and no guarding. No hernia.   Musculoskeletal: Normal range of motion. She exhibits no edema, tenderness or deformity.        Cervical back: She exhibits bony tenderness, swelling and pain. She exhibits normal range of motion, no edema, no deformity, no laceration, no spasm and normal pulse.   Lymphadenopathy:     She has no cervical adenopathy.   Neurological: She is alert and oriented to person, place, and time. She has normal reflexes. She displays normal reflexes. No cranial nerve deficit or sensory deficit. She exhibits normal muscle tone. Coordination normal.   Skin: Skin is warm and dry. No rash noted. She is not " diaphoretic. No erythema. No pallor.   Nursing note and vitals reviewed.      Assessment/Plan   Problem List Items Addressed This Visit        Other    Wellness examination - Primary    Relevant Orders    QuantiFERON TB Gold    Need for vaccination    Relevant Medications    Influenza Vac Subunit Quad (FLUCELVAX) injection 0.5 mL (Completed)           New Medications Ordered This Visit   Medications   • Influenza Vac Subunit Quad (FLUCELVAX) injection 0.5 mL       It's not just what you eat, but when you eat  Eat breakfast, and eat smaller meals throughout the day. A healthy breakfast can jumpstart your metabolism, while eating small, healthy meals (rather than the standard three large meals) keeps your energy up.   Avoid eating at night. Try to eat dinner earlier and fast for 14-16 hours until breakfast the next morning. Studies suggest that eating only when you’re most active and giving your digestive system a long break each day may help to regulate weight.

## 2019-12-24 LAB
QUANTIFERON CRITERIA: NORMAL
QUANTIFERON MITOGEN VALUE: 4.25 IU/ML
QUANTIFERON NIL VALUE: 0.03 IU/ML
QUANTIFERON TB1 AG VALUE: 0.07 IU/ML
QUANTIFERON TB2 AG VALUE: 0.06 IU/ML
QUANTIFERON-TB GOLD PLUS: NEGATIVE

## 2020-01-21 DIAGNOSIS — E55.9 VITAMIN D DEFICIENCY: Primary | ICD-10-CM

## 2020-01-21 DIAGNOSIS — E03.9 ACQUIRED HYPOTHYROIDISM: ICD-10-CM

## 2020-01-23 LAB
ALBUMIN SERPL-MCNC: 4.4 G/DL (ref 3.5–5)
ALBUMIN/GLOB SERPL: 1.4 G/DL (ref 1.1–1.8)
ALP SERPL-CCNC: 74 U/L (ref 38–126)
ALT SERPL W P-5'-P-CCNC: 17 U/L
ANION GAP SERPL CALCULATED.3IONS-SCNC: 7 MMOL/L (ref 5–15)
AST SERPL-CCNC: 24 U/L (ref 14–36)
BASOPHILS # BLD AUTO: 0.03 10*3/MM3 (ref 0–0.2)
BASOPHILS NFR BLD AUTO: 0.4 % (ref 0–1.5)
BILIRUB SERPL-MCNC: 0.5 MG/DL (ref 0.2–1.3)
BUN BLD-MCNC: 10 MG/DL (ref 7–23)
BUN/CREAT SERPL: 9.7 (ref 7–25)
CALCIUM SPEC-SCNC: 10.1 MG/DL (ref 8.4–10.2)
CHLORIDE SERPL-SCNC: 105 MMOL/L (ref 101–112)
CO2 SERPL-SCNC: 29 MMOL/L (ref 22–30)
CREAT BLD-MCNC: 1.03 MG/DL (ref 0.52–1.04)
DEPRECATED RDW RBC AUTO: 41.9 FL (ref 37–54)
EOSINOPHIL # BLD AUTO: 0.21 10*3/MM3 (ref 0–0.4)
EOSINOPHIL NFR BLD AUTO: 2.7 % (ref 0.3–6.2)
ERYTHROCYTE [DISTWIDTH] IN BLOOD BY AUTOMATED COUNT: 14.6 % (ref 12.3–15.4)
GFR SERPL CREATININE-BSD FRML MDRD: 65 ML/MIN/1.73 (ref 71–165)
GLOBULIN UR ELPH-MCNC: 3.1 GM/DL (ref 2.3–3.5)
GLUCOSE BLD-MCNC: 104 MG/DL (ref 70–99)
HCT VFR BLD AUTO: 40.1 % (ref 34–46.6)
HGB BLD-MCNC: 13.2 G/DL (ref 12–15.9)
LYMPHOCYTES # BLD AUTO: 0.94 10*3/MM3 (ref 0.7–3.1)
LYMPHOCYTES NFR BLD AUTO: 12.1 % (ref 19.6–45.3)
MCH RBC QN AUTO: 26.5 PG (ref 26.6–33)
MCHC RBC AUTO-ENTMCNC: 32.9 G/DL (ref 31.5–35.7)
MCV RBC AUTO: 80.5 FL (ref 79–97)
MONOCYTES # BLD AUTO: 0.52 10*3/MM3 (ref 0.1–0.9)
MONOCYTES NFR BLD AUTO: 6.7 % (ref 5–12)
NEUTROPHILS # BLD AUTO: 6.08 10*3/MM3 (ref 1.7–7)
NEUTROPHILS NFR BLD AUTO: 78.1 % (ref 42.7–76)
PLATELET # BLD AUTO: 201 10*3/MM3 (ref 140–450)
PMV BLD AUTO: 8.6 FL (ref 6–12)
POTASSIUM BLD-SCNC: 4.1 MMOL/L (ref 3.4–5)
PROT SERPL-MCNC: 7.5 G/DL (ref 6.3–8.6)
RBC # BLD AUTO: 4.98 10*6/MM3 (ref 3.77–5.28)
SODIUM BLD-SCNC: 141 MMOL/L (ref 137–145)
WBC NRBC COR # BLD: 7.78 10*3/MM3 (ref 3.4–10.8)

## 2020-01-23 PROCEDURE — 85025 COMPLETE CBC W/AUTO DIFF WBC: CPT | Performed by: NURSE PRACTITIONER

## 2020-01-23 PROCEDURE — 84443 ASSAY THYROID STIM HORMONE: CPT | Performed by: NURSE PRACTITIONER

## 2020-01-23 PROCEDURE — 84439 ASSAY OF FREE THYROXINE: CPT | Performed by: NURSE PRACTITIONER

## 2020-01-23 PROCEDURE — 82306 VITAMIN D 25 HYDROXY: CPT | Performed by: NURSE PRACTITIONER

## 2020-01-23 PROCEDURE — 36415 COLL VENOUS BLD VENIPUNCTURE: CPT | Performed by: NURSE PRACTITIONER

## 2020-01-23 PROCEDURE — 80053 COMPREHEN METABOLIC PANEL: CPT | Performed by: NURSE PRACTITIONER

## 2020-01-24 LAB
25(OH)D3 SERPL-MCNC: 32.2 NG/ML (ref 30–100)
T4 FREE SERPL-MCNC: 1.86 NG/DL (ref 0.93–1.7)
TSH SERPL DL<=0.05 MIU/L-ACNC: 0.03 UIU/ML (ref 0.27–4.2)

## 2020-01-27 ENCOUNTER — OFFICE VISIT (OUTPATIENT)
Dept: ENDOCRINOLOGY | Facility: CLINIC | Age: 25
End: 2020-01-27

## 2020-01-27 VITALS
OXYGEN SATURATION: 99 % | SYSTOLIC BLOOD PRESSURE: 120 MMHG | BODY MASS INDEX: 20.92 KG/M2 | HEIGHT: 67 IN | DIASTOLIC BLOOD PRESSURE: 78 MMHG | HEART RATE: 107 BPM | WEIGHT: 133.3 LBS

## 2020-01-27 DIAGNOSIS — E55.9 VITAMIN D DEFICIENCY: Primary | ICD-10-CM

## 2020-01-27 DIAGNOSIS — E61.1 IRON DEFICIENCY: ICD-10-CM

## 2020-01-27 DIAGNOSIS — E03.9 ACQUIRED HYPOTHYROIDISM: ICD-10-CM

## 2020-01-27 PROCEDURE — 99214 OFFICE O/P EST MOD 30 MIN: CPT | Performed by: NURSE PRACTITIONER

## 2020-01-27 NOTE — PROGRESS NOTES
Subjective    Lala Padilla is a 25 y.o. female. she is here today for follow-up.    History of Present Illness         25 year old female presents for follow up thyroid         REASON --- hypothyroidism         Duration  - since 2015      Timing - constant     Quality - not controlled      Severity - mild     Context - diagnosed with routine lab work      Location/size - thyroid      THYROID ULTRASOUND     HISTORY: Hypothyroidism. Acquired hypothyroidism.     The thyroid was scanned with a small parts transducer.     COMPARISON: None     FINDINGS:  The right lobe measures 4.23 cm in length by 1.13 cm AP by 1.23  cm transverse.  The left lobe measures 4.30 cm in length by 1.28 cm AP by 1.14 cm  transverse.  Unremarkable isthmus.     Thyroid of inhomogeneous echotexture.  No focal nodule or mass.     IMPRESSION:  CONCLUSION:  Heterogeneous thyroid which could represent thyroiditis.  No focal nodule or mass.     [id number]     Electronically signed by:  Jm Lee MD  7/26/2019 1:35 PM CDT  Workstation: [id number]        Quantity -      Lab Results   Component Value Date    TSH 0.027 (L) 01/23/2020             Symptoms -  fatigue, anxiety         Alleviating Factors - levothyroxine 125 mcg one daily         She takes the medication alone with no other medications or food      Aggravating Factors - none                   Evaluation history:  TSH   Date Value Ref Range Status   01/23/2020 0.027 (L) 0.270 - 4.200 uIU/mL Final     Free T4   Date Value Ref Range Status   01/23/2020 1.86 (H) 0.93 - 1.70 ng/dL Final       Current medications:  Current Outpatient Medications   Medication Sig Dispense Refill   • baclofen (LIORESAL) 10 MG tablet Take 1 tablet by mouth 2 (Two) Times a Day. 60 tablet 4   • buPROPion XL (WELLBUTRIN XL) 150 MG 24 hr tablet Take 150 mg by mouth Daily.  2   • Cholecalciferol (VITAMIN D) 25 MCG (1000 UT) tablet Take 1 tablet by mouth Daily. 90 tablet 3   • diclofenac (VOLTAREN) 50 MG EC  tablet Take 1 tablet by mouth 2 (Two) Times a Day. Bid prn 60 tablet 5   • ferrous sulfate 325 (65 FE) MG tablet Take 1 tablet by mouth Daily With Breakfast. 90 tablet 2   • levothyroxine (SYNTHROID) 125 MCG tablet Take 1 tablet by mouth Daily. 30 tablet 5     No current facility-administered medications for this visit.        The following portions of the patient's history were reviewed and updated as appropriate:   Past Medical History:   Diagnosis Date   • Acute tonsillitis    • Bronchitis    • Cellulitis     History of abscess, near left axilla      • Contusion of knee    • Depressive disorder    • Diarrhea    • Disease of thyroid gland    • Dysmenorrhea 9/28/2017   • Dysuria    • Encounter for gynecological examination (general) (routine) without abnormal findings    • Encounter for removal of sutures    • Encounter for surveillance of contraceptive pills    • Folliculitis    • Fracture of phalanx of hand     Left thumb      • Gastroenteritis    • Generalized anxiety disorder    • Hypothyroidism, unspecified    • Irregular periods    • Kidney stone    • Knee pain    • Lipoma    • Ovarian cyst    • Pain in joint, ankle and foot    • Pain in wrist    • Panic disorder    • Ruptured cyst of ovary    • Sports physical    • Sprain of ankle    • Strain of neck muscle    • Tick bite    • Upper respiratory infection      Past Surgical History:   Procedure Laterality Date   • ADENOIDECTOMY     • INCISION AND DRAINAGE ABSCESS  02/04/2009    Left axillary abscess   • INJECTION OF MEDICATION  05/31/2014    Toradol(1)   • INJECTION OF MEDICATION  05/31/2014    Zofran(1)   • KIDNEY STONE SURGERY     • TONSILLECTOMY       Family History   Problem Relation Age of Onset   • Cancer Other    • Diabetes Other    • Heart disease Other    • Hypertension Other    • Thyroid disease Other      OB History    None       No Known Allergies  Social History     Socioeconomic History   • Marital status: Single     Spouse name: Not on file   •  "Number of children: Not on file   • Years of education: Not on file   • Highest education level: Not on file   Tobacco Use   • Smoking status: Never Smoker   • Smokeless tobacco: Never Used   Substance and Sexual Activity   • Alcohol use: No   • Drug use: No   • Sexual activity: Not Currently     Birth control/protection: None       Review of Systems  Review of Systems   Constitutional: Negative for activity change, appetite change, diaphoresis and fatigue.   HENT: Negative for facial swelling, sneezing, sore throat, tinnitus, trouble swallowing and voice change.    Eyes: Negative for photophobia, pain, discharge, redness, itching and visual disturbance.   Respiratory: Negative for apnea, cough, choking, chest tightness and shortness of breath.    Cardiovascular: Negative for chest pain, palpitations and leg swelling.   Gastrointestinal: Negative for abdominal distention, abdominal pain, constipation, diarrhea, nausea and vomiting.   Endocrine: Negative for cold intolerance, heat intolerance, polydipsia, polyphagia and polyuria.   Genitourinary: Negative for difficulty urinating, dysuria, frequency, hematuria and urgency.   Musculoskeletal: Negative for arthralgias, back pain, gait problem, joint swelling, myalgias, neck pain and neck stiffness.   Skin: Negative for color change, pallor, rash and wound.   Neurological: Negative for dizziness, tremors, weakness, light-headedness, numbness and headaches.   Hematological: Negative for adenopathy. Does not bruise/bleed easily.   Psychiatric/Behavioral: Negative for behavioral problems, confusion and sleep disturbance.        Objective    /78   Pulse 107   Ht 170.2 cm (67\")   Wt 60.5 kg (133 lb 4.8 oz)   SpO2 99%   BMI 20.88 kg/m²   Physical Exam   Constitutional: She is oriented to person, place, and time. She appears well-developed and well-nourished. No distress.   HENT:   Head: Normocephalic and atraumatic.   Right Ear: External ear normal.   Left Ear: " External ear normal.   Nose: Nose normal.   Eyes: Pupils are equal, round, and reactive to light. Conjunctivae and EOM are normal.   Neck: Normal range of motion. Neck supple. No tracheal deviation present. No thyromegaly present.   Cardiovascular: Normal rate, regular rhythm and normal heart sounds.   No murmur heard.  Pulmonary/Chest: Effort normal and breath sounds normal. No respiratory distress. She has no wheezes.   Abdominal: Soft. Bowel sounds are normal. There is no tenderness. There is no rebound and no guarding.   Musculoskeletal: Normal range of motion. She exhibits no edema, tenderness or deformity.   Neurological: She is alert and oriented to person, place, and time. No cranial nerve deficit.   Skin: Skin is warm and dry. No rash noted.   Psychiatric: She has a normal mood and affect. Her behavior is normal. Judgment and thought content normal.       Lab Review  Lab Results   Component Value Date    TSH 0.027 (L) 01/23/2020     Lab Results   Component Value Date    FREET4 1.86 (H) 01/23/2020        Assessment/Plan      1. Vitamin D deficiency    2. Acquired hypothyroidism    3. Iron deficiency    . This diagnosis was discussed and reviewed with the patient including the advantages of drug therapy.     1. Orders placed during this encounter include:  Orders Placed This Encounter   Procedures   • Comprehensive Metabolic Panel   • TSH   • Vitamin B12   • Vitamin D 25 Hydroxy   • Iron Profile   • CBC & Differential     Order Specific Question:   Manual Differential     Answer:   No       Medications prescribed:  Outpatient Encounter Medications as of 1/27/2020   Medication Sig Dispense Refill   • baclofen (LIORESAL) 10 MG tablet Take 1 tablet by mouth 2 (Two) Times a Day. 60 tablet 4   • buPROPion XL (WELLBUTRIN XL) 150 MG 24 hr tablet Take 150 mg by mouth Daily.  2   • Cholecalciferol (VITAMIN D) 25 MCG (1000 UT) tablet Take 1 tablet by mouth Daily. 90 tablet 3   • diclofenac (VOLTAREN) 50 MG EC tablet  Take 1 tablet by mouth 2 (Two) Times a Day. Bid prn 60 tablet 5   • ferrous sulfate 325 (65 FE) MG tablet Take 1 tablet by mouth Daily With Breakfast. 90 tablet 2   • levothyroxine (SYNTHROID) 125 MCG tablet Take 1 tablet by mouth Daily. 30 tablet 5     No facility-administered encounter medications on file as of 1/27/2020.      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                 currently on 125 mcg of levothyroxine      Lab Results   Component Value Date    TSH 0.027 (L) 01/23/2020          Component      Latest Ref Rng & Units 1/23/2020   Free T4      0.93 - 1.70 ng/dL 1.86 (H)            decrease to 112 mcg daily            Bone Health     Vitamin d def..     Component      Latest Ref Rng & Units 3/13/2019   25 Hydroxy, Vitamin D      30.0 - 100.0 ng/ml 28.3 (L)         Component      Latest Ref Rng & Units 7/19/2019   25 Hydroxy, Vitamin D      30.0 - 100.0 ng/ml 35.8         Component      Latest Ref Rng & Units 1/23/2020   25 Hydroxy, Vitamin D      30.0 - 100.0 ng/ml 32.2         Taking MVI daily     4. Return in about 3 months (around 4/27/2020) for Recheck.

## 2020-02-17 ENCOUNTER — OFFICE VISIT (OUTPATIENT)
Dept: FAMILY MEDICINE CLINIC | Facility: CLINIC | Age: 25
End: 2020-02-17

## 2020-02-17 ENCOUNTER — APPOINTMENT (OUTPATIENT)
Dept: LAB | Facility: HOSPITAL | Age: 25
End: 2020-02-17

## 2020-02-17 VITALS
WEIGHT: 131.6 LBS | SYSTOLIC BLOOD PRESSURE: 100 MMHG | HEART RATE: 78 BPM | BODY MASS INDEX: 20.65 KG/M2 | OXYGEN SATURATION: 98 % | HEIGHT: 67 IN | DIASTOLIC BLOOD PRESSURE: 70 MMHG

## 2020-02-17 DIAGNOSIS — R41.840 CONCENTRATION DEFICIT: ICD-10-CM

## 2020-02-17 DIAGNOSIS — F41.9 ANXIETY: Primary | ICD-10-CM

## 2020-02-17 DIAGNOSIS — E03.9 ACQUIRED HYPOTHYROIDISM: ICD-10-CM

## 2020-02-17 LAB — TSH SERPL DL<=0.05 MIU/L-ACNC: 0.18 UIU/ML (ref 0.27–4.2)

## 2020-02-17 PROCEDURE — 99214 OFFICE O/P EST MOD 30 MIN: CPT | Performed by: NURSE PRACTITIONER

## 2020-02-17 PROCEDURE — 84443 ASSAY THYROID STIM HORMONE: CPT | Performed by: NURSE PRACTITIONER

## 2020-02-17 RX ORDER — BUPROPION HYDROCHLORIDE 100 MG/1
100 TABLET, EXTENDED RELEASE ORAL DAILY
Qty: 30 TABLET | Refills: 11 | Status: SHIPPED | OUTPATIENT
Start: 2020-02-17 | End: 2020-07-10

## 2020-02-17 NOTE — PROGRESS NOTES
Chief Complaint   Patient presents with   • Acquired hypothyroidism     Subjective   Lala Padilla is a 25 y.o. female.     Hypothyroidism   This is a chronic problem. The current episode started more than 1 year ago. The problem has been waxing and waning. Associated symptoms include arthralgias, fatigue, joint swelling and myalgias. Pertinent negatives include no abdominal pain, anorexia, change in bowel habit, chest pain, chills, congestion, coughing, diaphoresis, headaches, numbness, vertigo, visual change or weakness. The symptoms are aggravated by stress. She has tried rest (synthroid ) for the symptoms. The treatment provided mild relief.   Fatigue   This is a recurrent problem. The current episode started 1 to 4 weeks ago. The problem occurs every several days. The problem has been resolved. Associated symptoms include arthralgias, fatigue, joint swelling and myalgias. Pertinent negatives include no abdominal pain, anorexia, change in bowel habit, chest pain, chills, congestion, coughing, diaphoresis, headaches, numbness, vertigo, visual change or weakness. She has tried acetaminophen for the symptoms. The treatment provided mild relief.   Thyroid Problem   Presents for follow-up visit. Symptoms include depressed mood, dry skin and fatigue. Patient reports no anxiety, cold intolerance, constipation, diaphoresis, diarrhea, hair loss, heat intolerance, hoarse voice, leg swelling, menstrual problem, nail problem, palpitations, tremors, visual change, weight gain or weight loss. The symptoms have been stable.   Anxiety   Presents for follow-up visit. Symptoms include decreased concentration, depressed mood, excessive worry and panic. Patient reports no chest pain, compulsions, confusion, dizziness, dry mouth, feeling of choking, hyperventilation, impotence, insomnia, irritability, malaise, muscle tension, nervous/anxious behavior, obsessions, palpitations, restlessness, shortness of breath or  suicidal ideas. Symptoms occur most days. The quality of sleep is good. Nighttime awakenings: none.     Compliance with medications is %.        The following portions of the patient's history were reviewed and updated as appropriate: allergies, current medications, past social history and problem list.    Review of Systems   Constitutional: Positive for activity change and fatigue. Negative for appetite change, chills, diaphoresis, irritability, unexpected weight change, weight gain and weight loss.   HENT: Negative for congestion, dental problem, drooling, ear discharge, ear pain, facial swelling, hearing loss, hoarse voice, mouth sores, nosebleeds, postnasal drip, rhinorrhea and sinus pain.         Reports trouble swallowing on and off    Eyes: Negative.  Negative for photophobia, pain, discharge, redness, itching and visual disturbance.   Respiratory: Negative.  Negative for apnea, cough, choking, chest tightness, shortness of breath, wheezing and stridor.    Cardiovascular: Negative.  Negative for chest pain, palpitations and leg swelling.   Gastrointestinal: Negative.  Negative for abdominal distention, abdominal pain, anal bleeding, anorexia, blood in stool, change in bowel habit, constipation, diarrhea and rectal pain.   Endocrine: Negative for cold intolerance, heat intolerance, polydipsia, polyphagia and polyuria.   Genitourinary: Negative.  Negative for difficulty urinating, dyspareunia, dysuria, genital sores, hematuria, impotence and menstrual problem.   Musculoskeletal: Positive for arthralgias, back pain, gait problem, joint swelling and myalgias. Negative for neck stiffness.   Skin: Negative.  Negative for color change and pallor.   Allergic/Immunologic: Negative.  Negative for environmental allergies, food allergies and immunocompromised state.   Neurological: Negative for dizziness, vertigo, tremors, seizures, syncope, speech difficulty, weakness, light-headedness, numbness and headaches.  "  Hematological: Negative.  Negative for adenopathy. Does not bruise/bleed easily.   Psychiatric/Behavioral: Positive for decreased concentration and sleep disturbance. Negative for agitation, behavioral problems, confusion, dysphoric mood, hallucinations, self-injury and suicidal ideas. The patient is not nervous/anxious, does not have insomnia and is not hyperactive.         Reports anxiety and stress at times-having trouble concentrating        Objective   /70   Pulse 78   Ht 170.2 cm (67\")   Wt 59.7 kg (131 lb 9.6 oz)   SpO2 98%   BMI 20.61 kg/m²   Physical Exam   Constitutional: She is oriented to person, place, and time. She appears well-developed and well-nourished. No distress.   HENT:   Head: Normocephalic and atraumatic.   Right Ear: External ear normal.   Left Ear: External ear normal.   Nose: Nose normal.   Mouth/Throat: No oropharyngeal exudate.   Eyes: Pupils are equal, round, and reactive to light. Conjunctivae and EOM are normal. Right eye exhibits no discharge. Left eye exhibits no discharge. No scleral icterus.   Neck: Normal range of motion. Neck supple. No JVD present. No tracheal deviation present. No thyromegaly present.   Cardiovascular: Normal rate, regular rhythm, normal heart sounds and intact distal pulses. Exam reveals no gallop and no friction rub.   No murmur heard.  Pulmonary/Chest: Effort normal and breath sounds normal. No stridor. No respiratory distress. She has no wheezes. She has no rales. She exhibits no tenderness.   Abdominal: Soft. Bowel sounds are normal. She exhibits no distension and no mass. There is no tenderness. There is no rebound and no guarding. No hernia.   Musculoskeletal: Normal range of motion. She exhibits tenderness. She exhibits no edema or deformity.        Cervical back: She exhibits tenderness, bony tenderness, swelling and pain. She exhibits normal range of motion, no edema, no deformity, no laceration, no spasm and normal pulse.        " Back:    Lymphadenopathy:     She has no cervical adenopathy.   Neurological: She is alert and oriented to person, place, and time. She has normal reflexes. She displays normal reflexes. No cranial nerve deficit or sensory deficit. She exhibits normal muscle tone. Coordination normal.   Skin: Skin is warm and dry. No rash noted. She is not diaphoretic. No erythema. No pallor.   Nursing note and vitals reviewed.      Assessment/Plan   Problem List Items Addressed This Visit        Endocrine    Acquired hypothyroidism       Other    Anxiety - Primary    Relevant Orders    TSH    Concentration deficit    Relevant Orders    Ambulatory Referral to Behavioral Health (Completed)    TSH           New Medications Ordered This Visit   Medications   • buPROPion SR (WELLBUTRIN SR) 100 MG 12 hr tablet     Sig: Take 1 tablet by mouth Daily.     Dispense:  30 tablet     Refill:  11       It's not just what you eat, but when you eat  Eat breakfast, and eat smaller meals throughout the day. A healthy breakfast can jumpstart your metabolism, while eating small, healthy meals (rather than the standard three large meals) keeps your energy up.   Avoid eating at night. Try to eat dinner earlier and fast for 14-16 hours until breakfast the next morning. Studies suggest that eating only when you’re most active and giving your digestive system a long break each day may help to regulate weight.     Decrease wellbutrin and see if helps with symptoms-refer for mental health evaluation as directed, diet discussed, meds reviewed, no changes otherwise for now-tsh today

## 2020-02-18 ENCOUNTER — TELEPHONE (OUTPATIENT)
Dept: FAMILY MEDICINE CLINIC | Facility: CLINIC | Age: 25
End: 2020-02-18

## 2020-02-18 RX ORDER — LEVOTHYROXINE SODIUM 0.1 MG/1
100 TABLET ORAL DAILY
Qty: 30 TABLET | Refills: 5 | Status: SHIPPED | OUTPATIENT
Start: 2020-02-18 | End: 2020-04-28 | Stop reason: SDUPTHER

## 2020-02-18 NOTE — PROGRESS NOTES
Per MILLY Ratliff, Ms. Padilla has been called with recent lab results & recommendations.  Continue current medications and follow-up as planned or sooner if any problems.  New Script sent to [facility] in [place]

## 2020-02-18 NOTE — TELEPHONE ENCOUNTER
Per MILLY Ratliff, Ms. Padilla has been called with recent lab results & recommendations.  Continue current medications and follow-up as planned or sooner if any problems.  New Script sent to [facility] in [place]      ----- Message from MILLY Keen sent at 2/18/2020  2:43 PM CST -----  Regarding: FW:  Can you let her know still overstimulated. Go to the 100mcg daily. Needs a new RX. Probably causing all her symptoms  ----- Message -----  From: Lab, Background User  Sent: 2/17/2020   5:50 PM CST  To: MILLY Keen

## 2020-04-22 LAB
ALBUMIN SERPL-MCNC: 4.3 G/DL (ref 3.5–5)
ALBUMIN/GLOB SERPL: 1.4 G/DL (ref 1.1–1.8)
ALP SERPL-CCNC: 77 U/L (ref 38–126)
ALT SERPL W P-5'-P-CCNC: 13 U/L
ANION GAP SERPL CALCULATED.3IONS-SCNC: 10 MMOL/L (ref 5–15)
AST SERPL-CCNC: 25 U/L (ref 14–36)
BASOPHILS # BLD AUTO: 0.03 10*3/MM3 (ref 0–0.2)
BASOPHILS NFR BLD AUTO: 0.4 % (ref 0–1.5)
BILIRUB SERPL-MCNC: 0.6 MG/DL (ref 0.2–1.3)
BUN BLD-MCNC: 8 MG/DL (ref 7–23)
BUN/CREAT SERPL: 7.8 (ref 7–25)
CALCIUM SPEC-SCNC: 9.7 MG/DL (ref 8.4–10.2)
CHLORIDE SERPL-SCNC: 103 MMOL/L (ref 101–112)
CO2 SERPL-SCNC: 26 MMOL/L (ref 22–30)
CREAT BLD-MCNC: 1.02 MG/DL (ref 0.52–1.04)
DEPRECATED RDW RBC AUTO: 42.9 FL (ref 37–54)
EOSINOPHIL # BLD AUTO: 0.16 10*3/MM3 (ref 0–0.4)
EOSINOPHIL NFR BLD AUTO: 2 % (ref 0.3–6.2)
ERYTHROCYTE [DISTWIDTH] IN BLOOD BY AUTOMATED COUNT: 14.8 % (ref 12.3–15.4)
GFR SERPL CREATININE-BSD FRML MDRD: 66 ML/MIN/1.73 (ref 71–165)
GLOBULIN UR ELPH-MCNC: 3.1 GM/DL (ref 2.3–3.5)
GLUCOSE BLD-MCNC: 97 MG/DL (ref 70–99)
HCT VFR BLD AUTO: 39.7 % (ref 34–46.6)
HGB BLD-MCNC: 13.4 G/DL (ref 12–15.9)
LYMPHOCYTES # BLD AUTO: 1.29 10*3/MM3 (ref 0.7–3.1)
LYMPHOCYTES NFR BLD AUTO: 16.3 % (ref 19.6–45.3)
MCH RBC QN AUTO: 27.3 PG (ref 26.6–33)
MCHC RBC AUTO-ENTMCNC: 33.8 G/DL (ref 31.5–35.7)
MCV RBC AUTO: 81 FL (ref 79–97)
MONOCYTES # BLD AUTO: 0.59 10*3/MM3 (ref 0.1–0.9)
MONOCYTES NFR BLD AUTO: 7.4 % (ref 5–12)
NEUTROPHILS # BLD AUTO: 5.86 10*3/MM3 (ref 1.7–7)
NEUTROPHILS NFR BLD AUTO: 73.9 % (ref 42.7–76)
PLATELET # BLD AUTO: 242 10*3/MM3 (ref 140–450)
PMV BLD AUTO: 8.7 FL (ref 6–12)
POTASSIUM BLD-SCNC: 4.5 MMOL/L (ref 3.4–5)
PROT SERPL-MCNC: 7.4 G/DL (ref 6.3–8.6)
RBC # BLD AUTO: 4.9 10*6/MM3 (ref 3.77–5.28)
SODIUM BLD-SCNC: 139 MMOL/L (ref 137–145)
WBC NRBC COR # BLD: 7.93 10*3/MM3 (ref 3.4–10.8)

## 2020-04-22 PROCEDURE — 84466 ASSAY OF TRANSFERRIN: CPT | Performed by: NURSE PRACTITIONER

## 2020-04-22 PROCEDURE — 82306 VITAMIN D 25 HYDROXY: CPT | Performed by: NURSE PRACTITIONER

## 2020-04-22 PROCEDURE — 82607 VITAMIN B-12: CPT | Performed by: NURSE PRACTITIONER

## 2020-04-22 PROCEDURE — 36415 COLL VENOUS BLD VENIPUNCTURE: CPT | Performed by: NURSE PRACTITIONER

## 2020-04-22 PROCEDURE — 84443 ASSAY THYROID STIM HORMONE: CPT | Performed by: NURSE PRACTITIONER

## 2020-04-22 PROCEDURE — 80053 COMPREHEN METABOLIC PANEL: CPT | Performed by: NURSE PRACTITIONER

## 2020-04-22 PROCEDURE — 83540 ASSAY OF IRON: CPT | Performed by: NURSE PRACTITIONER

## 2020-04-22 PROCEDURE — 85025 COMPLETE CBC W/AUTO DIFF WBC: CPT | Performed by: NURSE PRACTITIONER

## 2020-04-23 LAB
25(OH)D3 SERPL-MCNC: 24.7 NG/ML (ref 30–100)
IRON 24H UR-MRATE: 135 MCG/DL (ref 37–145)
IRON SATN MFR SERPL: 41 % (ref 20–50)
TIBC SERPL-MCNC: 331 MCG/DL (ref 298–536)
TRANSFERRIN SERPL-MCNC: 222 MG/DL (ref 200–360)
TSH SERPL DL<=0.05 MIU/L-ACNC: 2.35 UIU/ML (ref 0.27–4.2)
VIT B12 BLD-MCNC: 387 PG/ML (ref 211–946)

## 2020-04-28 ENCOUNTER — TELEMEDICINE (OUTPATIENT)
Dept: ENDOCRINOLOGY | Facility: CLINIC | Age: 25
End: 2020-04-28

## 2020-04-28 DIAGNOSIS — E55.9 VITAMIN D DEFICIENCY: Primary | ICD-10-CM

## 2020-04-28 DIAGNOSIS — E03.9 ACQUIRED HYPOTHYROIDISM: ICD-10-CM

## 2020-04-28 PROCEDURE — 99214 OFFICE O/P EST MOD 30 MIN: CPT | Performed by: NURSE PRACTITIONER

## 2020-04-28 RX ORDER — LEVOTHYROXINE SODIUM 0.1 MG/1
100 TABLET ORAL DAILY
Qty: 30 TABLET | Refills: 11 | Status: SHIPPED | OUTPATIENT
Start: 2020-04-28 | End: 2020-08-31 | Stop reason: SDUPTHER

## 2020-04-28 NOTE — PROGRESS NOTES
Subjective    Lala Padilla is a 25 y.o. female. she is here for follow up     History of Present Illness       You have chosen to receive care through a telehealth visit.  Do you consent to use a video/audio connection for your medical care today? Yes        TELEHEALTH VIDEO VISIT     This a video visit due to [facility] current guidelines for social distancing due to the COVID 19 pandemic        25 year old female presents for follow up thyroid         REASON --- hypothyroidism         Duration  - since 2015      Timing - constant     Quality - not controlled      Severity - mild     Context - diagnosed with routine lab work      Location/size - thyroid      THYROID ULTRASOUND     HISTORY: Hypothyroidism. Acquired hypothyroidism.     The thyroid was scanned with a small parts transducer.     COMPARISON: None     FINDINGS:  The right lobe measures 4.23 cm in length by 1.13 cm AP by 1.23  cm transverse.  The left lobe measures 4.30 cm in length by 1.28 cm AP by 1.14 cm  transverse.  Unremarkable isthmus.     Thyroid of inhomogeneous echotexture.  No focal nodule or mass.     IMPRESSION:  CONCLUSION:  Heterogeneous thyroid which could represent thyroiditis.  No focal nodule or mass.     [id number]     Electronically signed by:  Jm Lee MD  7/26/2019 1:35 PM CDT  Workstation: [id number]        Quantity -    Lab Results   Component Value Date    TSH 2.350 04/22/2020                   Symptoms -  none today         Alleviating Factors - levothyroxine 100 mcg one daily            She takes the medication alone with no other medications or food      Aggravating Factors - none              Evaluation history:  TSH   Date Value Ref Range Status   04/22/2020 2.350 0.270 - 4.200 uIU/mL Final     Comment:     TSH results may be falsely decreased if patient taking Biotin.     Free T4   Date Value Ref Range Status   01/23/2020 1.86 (H) 0.93 - 1.70 ng/dL Final       Current medications:  Current Outpatient Medications    Medication Sig Dispense Refill   • baclofen (LIORESAL) 10 MG tablet Take 1 tablet by mouth 2 (Two) Times a Day. 60 tablet 4   • buPROPion SR (WELLBUTRIN SR) 100 MG 12 hr tablet Take 1 tablet by mouth Daily. 30 tablet 11   • Cholecalciferol (VITAMIN D) 25 MCG (1000 UT) tablet Take 1 tablet by mouth Daily. 90 tablet 3   • levothyroxine (Synthroid) 100 MCG tablet Take 1 tablet by mouth Daily. 30 tablet 11     No current facility-administered medications for this visit.        The following portions of the patient's history were reviewed and updated as appropriate:   Past Medical History:   Diagnosis Date   • Acute tonsillitis    • Bronchitis    • Cellulitis     History of abscess, near left axilla      • Contusion of knee    • Depressive disorder    • Diarrhea    • Disease of thyroid gland    • Dysmenorrhea 9/28/2017   • Dysuria    • Encounter for gynecological examination (general) (routine) without abnormal findings    • Encounter for removal of sutures    • Encounter for surveillance of contraceptive pills    • Folliculitis    • Fracture of phalanx of hand     Left thumb      • Gastroenteritis    • Generalized anxiety disorder    • Hypothyroidism, unspecified    • Irregular periods    • Kidney stone    • Knee pain    • Lipoma    • Ovarian cyst    • Pain in joint, ankle and foot    • Pain in wrist    • Panic disorder    • Ruptured cyst of ovary    • Sports physical    • Sprain of ankle    • Strain of neck muscle    • Tick bite    • Upper respiratory infection      Past Surgical History:   Procedure Laterality Date   • ADENOIDECTOMY     • INCISION AND DRAINAGE ABSCESS  02/04/2009    Left axillary abscess   • INJECTION OF MEDICATION  05/31/2014    Toradol(1)   • INJECTION OF MEDICATION  05/31/2014    Zofran(1)   • KIDNEY STONE SURGERY     • TONSILLECTOMY       Family History   Problem Relation Age of Onset   • Cancer Other    • Diabetes Other    • Heart disease Other    • Hypertension Other    • Thyroid disease Other       OB History    None       No Known Allergies  Social History     Socioeconomic History   • Marital status: Single     Spouse name: Not on file   • Number of children: Not on file   • Years of education: Not on file   • Highest education level: Not on file   Tobacco Use   • Smoking status: Never Smoker   • Smokeless tobacco: Never Used   Substance and Sexual Activity   • Alcohol use: No   • Drug use: No   • Sexual activity: Not Currently     Birth control/protection: None       Review of Systems  Review of Systems   Constitutional: Negative for activity change, appetite change, diaphoresis and fatigue.   HENT: Negative for facial swelling, sneezing, sore throat, tinnitus, trouble swallowing and voice change.    Eyes: Negative for photophobia, pain, discharge, redness, itching and visual disturbance.   Respiratory: Negative for apnea, cough, choking, chest tightness and shortness of breath.    Cardiovascular: Negative for chest pain, palpitations and leg swelling.   Gastrointestinal: Negative for abdominal distention, abdominal pain, constipation, diarrhea, nausea and vomiting.   Endocrine: Negative for cold intolerance, heat intolerance, polydipsia, polyphagia and polyuria.   Genitourinary: Negative for difficulty urinating, dysuria, frequency, hematuria and urgency.   Musculoskeletal: Negative for arthralgias, back pain, gait problem, joint swelling, myalgias, neck pain and neck stiffness.   Skin: Negative for color change, pallor, rash and wound.   Neurological: Negative for dizziness, tremors, weakness, light-headedness, numbness and headaches.   Hematological: Negative for adenopathy. Does not bruise/bleed easily.   Psychiatric/Behavioral: Negative for behavioral problems, confusion and sleep disturbance.        Objective    There were no vitals taken for this visit.  Physical Exam   Constitutional: She is oriented to person, place, and time. She appears well-developed and well-nourished. No distress.   HENT:    Head: Normocephalic and atraumatic.   Right Ear: External ear normal.   Left Ear: External ear normal.   Nose: Nose normal.   Mouth/Throat: Oropharynx is clear and moist.   Eyes: Pupils are equal, round, and reactive to light. Conjunctivae and EOM are normal.   Neck: Normal range of motion.   Pulmonary/Chest: Effort normal. No respiratory distress.   Musculoskeletal: Normal range of motion.   Neurological: She is alert and oriented to person, place, and time.   Skin: No pallor.   Psychiatric: She has a normal mood and affect. Her behavior is normal. Judgment and thought content normal.       Lab Review  Lab Results   Component Value Date    TSH 2.350 04/22/2020     Lab Results   Component Value Date    FREET4 1.86 (H) 01/23/2020        Assessment/Plan      1. Vitamin D deficiency    2. Acquired hypothyroidism    . This diagnosis was discussed and reviewed with the patient including the advantages of drug therapy.     1. Orders placed during this encounter include:  Orders Placed This Encounter   Procedures   • Comprehensive Metabolic Panel   • TSH   • Vitamin D 25 Hydroxy   • CBC & Differential     Order Specific Question:   Manual Differential     Answer:   No       Medications prescribed:  Outpatient Encounter Medications as of 4/28/2020   Medication Sig Dispense Refill   • baclofen (LIORESAL) 10 MG tablet Take 1 tablet by mouth 2 (Two) Times a Day. 60 tablet 4   • buPROPion SR (WELLBUTRIN SR) 100 MG 12 hr tablet Take 1 tablet by mouth Daily. 30 tablet 11   • Cholecalciferol (VITAMIN D) 25 MCG (1000 UT) tablet Take 1 tablet by mouth Daily. 90 tablet 3   • levothyroxine (Synthroid) 100 MCG tablet Take 1 tablet by mouth Daily. 30 tablet 11   • [DISCONTINUED] diclofenac (VOLTAREN) 50 MG EC tablet Take 1 tablet by mouth 2 (Two) Times a Day. Bid prn 60 tablet 5   • [DISCONTINUED] ferrous sulfate 325 (65 FE) MG tablet Take 1 tablet by mouth Daily With Breakfast. 90 tablet 2   • [DISCONTINUED] levothyroxine (SYNTHROID)  100 MCG tablet Take 1 tablet by mouth Daily. 30 tablet 5     No facility-administered encounter medications on file as of 4/28/2020.      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                Taking MVI daily --not taking     Please restart       Component      Latest Ref Rng & Units 4/22/2020   25 Hydroxy, Vitamin D      30.0 - 100.0 ng/ml 24.7 (L)       We spent 25 minutes on the video call    I reviewed the patients electronic chart, reviewed medications, reviewed past and current labs, and active problems      I provided advice regarding thyroid disease management, refilled medications today, order future labs and made future appointment     Patient was advised to contact the office if there were any unanswered questions or any concerns        4. Return in about 3 months (around 7/28/2020).

## 2020-05-22 ENCOUNTER — OFFICE VISIT (OUTPATIENT)
Dept: FAMILY MEDICINE CLINIC | Facility: CLINIC | Age: 25
End: 2020-05-22

## 2020-05-22 DIAGNOSIS — F43.23 ADJUSTMENT DISORDER WITH MIXED ANXIETY AND DEPRESSED MOOD: Primary | ICD-10-CM

## 2020-05-22 PROCEDURE — 90791 PSYCH DIAGNOSTIC EVALUATION: CPT | Performed by: PSYCHOLOGIST
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
## 2020-07-10 ENCOUNTER — OFFICE VISIT (OUTPATIENT)
Dept: FAMILY MEDICINE CLINIC | Facility: CLINIC | Age: 25
End: 2020-07-10

## 2020-07-10 ENCOUNTER — LAB (OUTPATIENT)
Dept: LAB | Facility: HOSPITAL | Age: 25
End: 2020-07-10

## 2020-07-10 VITALS
BODY MASS INDEX: 20.88 KG/M2 | HEIGHT: 67 IN | WEIGHT: 133 LBS | SYSTOLIC BLOOD PRESSURE: 120 MMHG | DIASTOLIC BLOOD PRESSURE: 72 MMHG

## 2020-07-10 DIAGNOSIS — N89.8 VAGINAL DISCHARGE: ICD-10-CM

## 2020-07-10 DIAGNOSIS — E03.9 HYPOTHYROIDISM, UNSPECIFIED TYPE: ICD-10-CM

## 2020-07-10 DIAGNOSIS — N92.6 IRREGULAR MENSTRUAL CYCLE: Primary | ICD-10-CM

## 2020-07-10 DIAGNOSIS — R53.83 MALAISE AND FATIGUE: ICD-10-CM

## 2020-07-10 DIAGNOSIS — R53.81 MALAISE AND FATIGUE: ICD-10-CM

## 2020-07-10 LAB
CANDIDA ALBICANS: NEGATIVE
GARDNERELLA VAGINALIS: NEGATIVE
T VAGINALIS DNA VAG QL PROBE+SIG AMP: NEGATIVE

## 2020-07-10 PROCEDURE — 87510 GARDNER VAG DNA DIR PROBE: CPT | Performed by: NURSE PRACTITIONER

## 2020-07-10 PROCEDURE — 83540 ASSAY OF IRON: CPT | Performed by: NURSE PRACTITIONER

## 2020-07-10 PROCEDURE — 80053 COMPREHEN METABOLIC PANEL: CPT | Performed by: NURSE PRACTITIONER

## 2020-07-10 PROCEDURE — 85025 COMPLETE CBC W/AUTO DIFF WBC: CPT | Performed by: NURSE PRACTITIONER

## 2020-07-10 PROCEDURE — 99214 OFFICE O/P EST MOD 30 MIN: CPT | Performed by: NURSE PRACTITIONER

## 2020-07-10 PROCEDURE — 82607 VITAMIN B-12: CPT | Performed by: NURSE PRACTITIONER

## 2020-07-10 PROCEDURE — 36415 COLL VENOUS BLD VENIPUNCTURE: CPT | Performed by: NURSE PRACTITIONER

## 2020-07-10 PROCEDURE — 87660 TRICHOMONAS VAGIN DIR PROBE: CPT | Performed by: NURSE PRACTITIONER

## 2020-07-10 PROCEDURE — 87109 MYCOPLASMA: CPT | Performed by: NURSE PRACTITIONER

## 2020-07-10 PROCEDURE — 82306 VITAMIN D 25 HYDROXY: CPT | Performed by: NURSE PRACTITIONER

## 2020-07-10 PROCEDURE — 87480 CANDIDA DNA DIR PROBE: CPT | Performed by: NURSE PRACTITIONER

## 2020-07-10 PROCEDURE — 84443 ASSAY THYROID STIM HORMONE: CPT | Performed by: NURSE PRACTITIONER

## 2020-07-10 PROCEDURE — 83735 ASSAY OF MAGNESIUM: CPT | Performed by: NURSE PRACTITIONER

## 2020-07-10 NOTE — PROGRESS NOTES
Chief Complaint   Patient presents with   • Menstrual Problem     has had a period for a month now     Subjective   Lala Padilla is a 25 y.o. female.     Menstrual Problem   Associated symptoms include arthralgias, fatigue, joint swelling and myalgias. Pertinent negatives include no abdominal pain, anorexia, change in bowel habit, chest pain, chills, congestion, coughing, diaphoresis, fever, headaches, neck pain, numbness, sore throat, vertigo, visual change, vomiting or weakness.   Hypothyroidism   This is a chronic problem. The current episode started more than 1 year ago. The problem has been waxing and waning. Associated symptoms include arthralgias, fatigue, joint swelling and myalgias. Pertinent negatives include no abdominal pain, anorexia, change in bowel habit, chest pain, chills, congestion, coughing, diaphoresis, fever, headaches, neck pain, numbness, sore throat, vertigo, visual change, vomiting or weakness. The symptoms are aggravated by stress. She has tried rest (synthroid ) for the symptoms. The treatment provided mild relief.   Fatigue   This is a recurrent problem. The current episode started 1 to 4 weeks ago. The problem occurs every several days. The problem has been resolved. Associated symptoms include arthralgias, fatigue, joint swelling and myalgias. Pertinent negatives include no abdominal pain, anorexia, change in bowel habit, chest pain, chills, congestion, coughing, diaphoresis, fever, headaches, neck pain, numbness, sore throat, vertigo, visual change, vomiting or weakness. She has tried acetaminophen for the symptoms. The treatment provided mild relief.   Thyroid Problem   Presents for follow-up visit. Symptoms include anxiety, dry skin, fatigue and menstrual problem. Patient reports no cold intolerance, constipation, diaphoresis, diarrhea, hair loss, heat intolerance, hoarse voice, leg swelling, nail problem, palpitations, tremors, visual change, weight gain or weight  loss. The symptoms have been stable.        The following portions of the patient's history were reviewed and updated as appropriate: allergies, current medications, past social history and problem list.    Review of Systems   Constitutional: Positive for activity change and fatigue. Negative for appetite change, chills, diaphoresis, fever, unexpected weight change, weight gain and weight loss.   HENT: Negative for congestion, dental problem, drooling, ear discharge, ear pain, facial swelling, hearing loss, hoarse voice, mouth sores, nosebleeds, postnasal drip, rhinorrhea, sinus pressure, sinus pain, sneezing, sore throat, tinnitus and trouble swallowing.         Reports trouble swallowing on and off    Eyes: Negative.  Negative for photophobia, pain, discharge, redness, itching and visual disturbance.   Respiratory: Negative.  Negative for apnea, cough, choking, chest tightness, wheezing and stridor.    Cardiovascular: Negative.  Negative for chest pain, palpitations and leg swelling.   Gastrointestinal: Negative.  Negative for abdominal distention, abdominal pain, anal bleeding, anorexia, blood in stool, change in bowel habit, constipation, diarrhea, rectal pain and vomiting.   Endocrine: Negative for cold intolerance, heat intolerance, polydipsia, polyphagia and polyuria.   Genitourinary: Positive for menstrual problem and vaginal discharge. Negative for decreased urine volume, difficulty urinating, dyspareunia, dysuria, enuresis, flank pain, frequency, genital sores, hematuria, pelvic pain, urgency, vaginal bleeding and vaginal pain.        Reports green discharge with no odor    Musculoskeletal: Positive for arthralgias, back pain, gait problem, joint swelling and myalgias. Negative for neck pain and neck stiffness.   Skin: Negative.  Negative for color change and pallor.   Allergic/Immunologic: Negative.  Negative for environmental allergies, food allergies and immunocompromised state.   Neurological: Negative  "for dizziness, vertigo, tremors, seizures, syncope, facial asymmetry, speech difficulty, weakness, light-headedness, numbness and headaches.   Hematological: Negative.  Negative for adenopathy. Does not bruise/bleed easily.   Psychiatric/Behavioral: Positive for sleep disturbance. Negative for agitation, behavioral problems, confusion, decreased concentration, dysphoric mood, hallucinations and self-injury. The patient is nervous/anxious. The patient is not hyperactive.        Objective   /72   Ht 170.2 cm (67\")   Wt 60.3 kg (133 lb)   BMI 20.83 kg/m²   Physical Exam   Constitutional: She is oriented to person, place, and time. She appears well-developed and well-nourished. No distress.   HENT:   Head: Normocephalic and atraumatic.   Right Ear: External ear normal.   Left Ear: External ear normal.   Nose: Nose normal.   Mouth/Throat: No oropharyngeal exudate.   Eyes: Pupils are equal, round, and reactive to light. Conjunctivae and EOM are normal. Right eye exhibits no discharge. Left eye exhibits no discharge. No scleral icterus.   Neck: Normal range of motion. Neck supple. No JVD present. No tracheal deviation present. No thyromegaly present.   Cardiovascular: Normal rate, regular rhythm, normal heart sounds and intact distal pulses. Exam reveals no gallop and no friction rub.   No murmur heard.  Pulmonary/Chest: Effort normal and breath sounds normal. No stridor. No respiratory distress. She has no wheezes. She has no rales. She exhibits no tenderness.   Abdominal: Soft. Bowel sounds are normal. She exhibits no distension and no mass. There is no tenderness. There is no rebound and no guarding. No hernia.   Genitourinary: Rectal exam shows guaiac negative stool. No erythema, tenderness or bleeding in the vagina. No foreign body in the vagina. No signs of injury around the vagina. Vaginal discharge found.   Genitourinary Comments: Bloody discharge noted    Musculoskeletal: Normal range of motion. She " exhibits no edema, tenderness or deformity.        Cervical back: She exhibits normal range of motion, no bony tenderness, no edema, no deformity, no laceration, no pain, no spasm and normal pulse.   Lymphadenopathy:     She has no cervical adenopathy.   Neurological: She is alert and oriented to person, place, and time. She has normal reflexes. She displays normal reflexes. No cranial nerve deficit or sensory deficit. She exhibits normal muscle tone. Coordination normal.   Skin: Skin is warm and dry. No rash noted. She is not diaphoretic. No erythema. No pallor.   Nursing note and vitals reviewed.      Assessment/Plan   Problem List Items Addressed This Visit        Endocrine    Hypothyroidism       Genitourinary    Vaginal discharge    Relevant Orders    Gardnerella vaginalis, Trichomonas vaginalis, Candida albicans, DNA - Swab, Vagina    Mycoplasma / Ureaplasma Culture - Swab, Cervix    Irregular menstrual cycle - Primary    Relevant Orders    Iron    Vitamin B12    Magnesium    Gardnerella vaginalis, Trichomonas vaginalis, Candida albicans, DNA - Swab, Vagina       Other    Malaise and fatigue         No orders of the defined types were placed in this encounter.        Answers for HPI/ROS submitted by the patient on 7/10/2020   What is the primary reason for your visit?: Other  Please describe your symptoms.: I have been on my period for around a month. Each time my cycle ends, it starts again a few days later. There is also green discharge that happens at the end of my cycle.  Have you had these symptoms before?: No  How long have you been having these symptoms?: Greater than 2 weeks      Labs as directed-will notify of labs as directed

## 2020-07-11 LAB
25(OH)D3 SERPL-MCNC: 34.3 NG/ML (ref 30–100)
ALBUMIN SERPL-MCNC: 4.6 G/DL (ref 3.5–5.2)
ALBUMIN/GLOB SERPL: 1.5 G/DL
ALP SERPL-CCNC: 73 U/L (ref 39–117)
ALT SERPL W P-5'-P-CCNC: 8 U/L (ref 1–33)
ANION GAP SERPL CALCULATED.3IONS-SCNC: 12 MMOL/L (ref 5–15)
AST SERPL-CCNC: 15 U/L (ref 1–32)
BASOPHILS # BLD AUTO: 0.05 10*3/MM3 (ref 0–0.2)
BASOPHILS NFR BLD AUTO: 0.5 % (ref 0–1.5)
BILIRUB SERPL-MCNC: 0.5 MG/DL (ref 0–1.2)
BUN SERPL-MCNC: 10 MG/DL (ref 6–20)
BUN/CREAT SERPL: 10.3 (ref 7–25)
CALCIUM SPEC-SCNC: 9.9 MG/DL (ref 8.6–10.5)
CHLORIDE SERPL-SCNC: 102 MMOL/L (ref 98–107)
CO2 SERPL-SCNC: 26 MMOL/L (ref 22–29)
CREAT SERPL-MCNC: 0.97 MG/DL (ref 0.57–1)
DEPRECATED RDW RBC AUTO: 39.9 FL (ref 37–54)
EOSINOPHIL # BLD AUTO: 0.14 10*3/MM3 (ref 0–0.4)
EOSINOPHIL NFR BLD AUTO: 1.3 % (ref 0.3–6.2)
ERYTHROCYTE [DISTWIDTH] IN BLOOD BY AUTOMATED COUNT: 13.2 % (ref 12.3–15.4)
GFR SERPL CREATININE-BSD FRML MDRD: 69 ML/MIN/1.73
GLOBULIN UR ELPH-MCNC: 3 GM/DL
GLUCOSE SERPL-MCNC: 97 MG/DL (ref 65–99)
HCT VFR BLD AUTO: 40.5 % (ref 34–46.6)
HGB BLD-MCNC: 13.6 G/DL (ref 12–15.9)
IMM GRANULOCYTES # BLD AUTO: 0.02 10*3/MM3 (ref 0–0.05)
IMM GRANULOCYTES NFR BLD AUTO: 0.2 % (ref 0–0.5)
IRON 24H UR-MRATE: 42 MCG/DL (ref 37–145)
LYMPHOCYTES # BLD AUTO: 1.79 10*3/MM3 (ref 0.7–3.1)
LYMPHOCYTES NFR BLD AUTO: 16.8 % (ref 19.6–45.3)
MAGNESIUM SERPL-MCNC: 2 MG/DL (ref 1.6–2.6)
MCH RBC QN AUTO: 27.9 PG (ref 26.6–33)
MCHC RBC AUTO-ENTMCNC: 33.6 G/DL (ref 31.5–35.7)
MCV RBC AUTO: 83.2 FL (ref 79–97)
MONOCYTES # BLD AUTO: 0.57 10*3/MM3 (ref 0.1–0.9)
MONOCYTES NFR BLD AUTO: 5.4 % (ref 5–12)
NEUTROPHILS NFR BLD AUTO: 75.8 % (ref 42.7–76)
NEUTROPHILS NFR BLD AUTO: 8.06 10*3/MM3 (ref 1.7–7)
NRBC BLD AUTO-RTO: 0 /100 WBC (ref 0–0.2)
PLATELET # BLD AUTO: 263 10*3/MM3 (ref 140–450)
PMV BLD AUTO: 9.5 FL (ref 6–12)
POTASSIUM SERPL-SCNC: 3.7 MMOL/L (ref 3.5–5.2)
PROT SERPL-MCNC: 7.6 G/DL (ref 6–8.5)
RBC # BLD AUTO: 4.87 10*6/MM3 (ref 3.77–5.28)
SODIUM SERPL-SCNC: 140 MMOL/L (ref 136–145)
TSH SERPL DL<=0.05 MIU/L-ACNC: 1.08 UIU/ML (ref 0.27–4.2)
VIT B12 BLD-MCNC: 366 PG/ML (ref 211–946)
WBC # BLD AUTO: 10.63 10*3/MM3 (ref 3.4–10.8)

## 2020-07-13 ENCOUNTER — TELEPHONE (OUTPATIENT)
Dept: FAMILY MEDICINE CLINIC | Facility: CLINIC | Age: 25
End: 2020-07-13

## 2020-07-13 NOTE — PROGRESS NOTES
Per MILLY Ratliff, Ms. Padilla  has been called with recent lab results & recommendations.  Continue current medications and follow-up as planned or sooner if any problems.

## 2020-07-13 NOTE — TELEPHONE ENCOUNTER
Per MILLY Ratliff, Ms. Padilla  has been called with recent lab results & recommendations.  Continue current medications and follow-up as planned or sooner if any problems.      ----- Message from MILLY Keen sent at 7/12/2020 11:24 AM CDT -----  Regarding: FW:  Can you let her know normal  ----- Message -----  From: Lab, Background User  Sent: 7/10/2020   6:12 PM CDT  To: MILLY Keen

## 2020-07-14 ENCOUNTER — OFFICE VISIT (OUTPATIENT)
Dept: BEHAVIORAL HEALTH | Facility: CLINIC | Age: 25
End: 2020-07-14

## 2020-07-14 DIAGNOSIS — F90.2 ATTENTION DEFICIT HYPERACTIVITY DISORDER, COMBINED TYPE: ICD-10-CM

## 2020-07-14 DIAGNOSIS — F33.1 MAJOR DEPRESSIVE DISORDER, RECURRENT EPISODE, MODERATE (HCC): Primary | ICD-10-CM

## 2020-07-14 PROCEDURE — 96130 PSYCL TST EVAL PHYS/QHP 1ST: CPT | Performed by: PSYCHOLOGIST

## 2020-07-20 ENCOUNTER — TELEPHONE (OUTPATIENT)
Dept: FAMILY MEDICINE CLINIC | Facility: CLINIC | Age: 25
End: 2020-07-20

## 2020-07-20 LAB
MYCOPLASMA HOMINIS: NEGATIVE
UREAPLASMA UREALYTICUM: NEGATIVE

## 2020-07-20 NOTE — TELEPHONE ENCOUNTER
Per MILLY Ratliff, Ms. Brothivan has been called with recent lab results & recommendations.  Continue current medications and follow-up as planned or sooner if any problems.      ----- Message from MILLY Keen sent at 7/20/2020 10:08 AM CDT -----  Can you let her know her vaginal swabs normal

## 2020-07-21 ENCOUNTER — TELEPHONE (OUTPATIENT)
Dept: FAMILY MEDICINE CLINIC | Facility: CLINIC | Age: 25
End: 2020-07-21

## 2020-07-21 NOTE — TELEPHONE ENCOUNTER
----- Message from Sergey Jesus, PhD sent at 7/21/2020 11:56 AM CDT -----  Regarding: report   The report is in the EMR.    Please contact the client.    Thanks!

## 2020-07-21 NOTE — TELEPHONE ENCOUNTER
CALLED PATIENT TO LET THEM KNOW THAT THE ASSESSMENT PAPERS FROM TESTING IS COMPLETE AND READY FOR  WHEN POSSIBLE. LEFT VM.    THANKS,  SHAWN
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
## 2020-07-29 ENCOUNTER — OFFICE VISIT (OUTPATIENT)
Dept: ENDOCRINOLOGY | Facility: CLINIC | Age: 25
End: 2020-07-29

## 2020-07-29 VITALS
DIASTOLIC BLOOD PRESSURE: 70 MMHG | OXYGEN SATURATION: 99 % | SYSTOLIC BLOOD PRESSURE: 120 MMHG | BODY MASS INDEX: 21.05 KG/M2 | HEART RATE: 125 BPM | WEIGHT: 134.1 LBS | HEIGHT: 67 IN

## 2020-07-29 DIAGNOSIS — E55.9 VITAMIN D DEFICIENCY: Primary | ICD-10-CM

## 2020-07-29 DIAGNOSIS — E06.3 HASHIMOTO'S DISEASE: ICD-10-CM

## 2020-07-29 PROCEDURE — 99214 OFFICE O/P EST MOD 30 MIN: CPT | Performed by: NURSE PRACTITIONER

## 2020-07-29 NOTE — PROGRESS NOTES
Melissa Padilla is a 25 y.o. female. she is here today for follow-up.    History of Present Illness     IN OFFICE VISIT       25 year old female presents for follow up thyroid         REASON --- hypothyroidism         Duration  - since 2015      Timing - constant     Quality - not controlled      Severity - mild     Context - diagnosed with routine lab work      Location/size - thyroid      THYROID ULTRASOUND     HISTORY: Hypothyroidism. Acquired hypothyroidism.     The thyroid was scanned with a small parts transducer.     COMPARISON: None     FINDINGS:  The right lobe measures 4.23 cm in length by 1.13 cm AP by 1.23  cm transverse.  The left lobe measures 4.30 cm in length by 1.28 cm AP by 1.14 cm  transverse.  Unremarkable isthmus.     Thyroid of inhomogeneous echotexture.  No focal nodule or mass.     IMPRESSION:  CONCLUSION:  Heterogeneous thyroid which could represent thyroiditis.  No focal nodule or mass.     [id number]     Electronically signed by:  Jm Lee MD  7/26/2019 1:35 PM CDT  Workstation: [id number]        Quantity -           Lab Results   Component Value Date     TSH 2.350 04/22/2020                     Symptoms -  none today         Alleviating Factors - levothyroxine 100 mcg one daily            She takes the medication alone with no other medications or food      Aggravating Factors - none                Evaluation history:  TSH   Date Value Ref Range Status   07/10/2020 1.080 0.270 - 4.200 uIU/mL Final     Free T4   Date Value Ref Range Status   01/23/2020 1.86 (H) 0.93 - 1.70 ng/dL Final       Current medications:  Current Outpatient Medications   Medication Sig Dispense Refill   • Cholecalciferol (VITAMIN D) 25 MCG (1000 UT) tablet Take 1 tablet by mouth Daily. 90 tablet 3   • levothyroxine (Synthroid) 100 MCG tablet Take 1 tablet by mouth Daily. 30 tablet 11   • baclofen (LIORESAL) 10 MG tablet Take 1 tablet by mouth 2 (Two) Times a Day. 60 tablet 4     No current  facility-administered medications for this visit.        The following portions of the patient's history were reviewed and updated as appropriate:   Past Medical History:   Diagnosis Date   • Acute tonsillitis    • Bronchitis    • Cellulitis     History of abscess, near left axilla      • Contusion of knee    • Depressive disorder    • Diarrhea    • Disease of thyroid gland    • Dysmenorrhea 9/28/2017   • Dysuria    • Encounter for gynecological examination (general) (routine) without abnormal findings    • Encounter for removal of sutures    • Encounter for surveillance of contraceptive pills    • Folliculitis    • Fracture of phalanx of hand     Left thumb      • Gastroenteritis    • Generalized anxiety disorder    • Hypothyroidism, unspecified    • Irregular periods    • Kidney stone    • Knee pain    • Lipoma    • Ovarian cyst    • Pain in joint, ankle and foot    • Pain in wrist    • Panic disorder    • Ruptured cyst of ovary    • Sports physical    • Sprain of ankle    • Strain of neck muscle    • Tick bite    • Upper respiratory infection      Past Surgical History:   Procedure Laterality Date   • ADENOIDECTOMY     • INCISION AND DRAINAGE ABSCESS  02/04/2009    Left axillary abscess   • INJECTION OF MEDICATION  05/31/2014    Toradol(1)   • INJECTION OF MEDICATION  05/31/2014    Zofran(1)   • KIDNEY STONE SURGERY     • TONSILLECTOMY       Family History   Problem Relation Age of Onset   • Cancer Other    • Diabetes Other    • Heart disease Other    • Hypertension Other    • Thyroid disease Other      OB History    None       No Known Allergies  Social History     Socioeconomic History   • Marital status: Single     Spouse name: Not on file   • Number of children: Not on file   • Years of education: Not on file   • Highest education level: Not on file   Tobacco Use   • Smoking status: Never Smoker   • Smokeless tobacco: Never Used   Substance and Sexual Activity   • Alcohol use: No   • Drug use: No   • Sexual  "activity: Not Currently     Birth control/protection: None       Review of Systems  Review of Systems   Constitutional: Negative for activity change, appetite change, diaphoresis and fatigue.   HENT: Negative for facial swelling, sneezing, sore throat, tinnitus, trouble swallowing and voice change.    Eyes: Negative for photophobia, pain, discharge, redness, itching and visual disturbance.   Respiratory: Negative for apnea, cough, choking, chest tightness and shortness of breath.    Cardiovascular: Negative for chest pain, palpitations and leg swelling.   Gastrointestinal: Negative for abdominal distention, abdominal pain, constipation, diarrhea, nausea and vomiting.   Endocrine: Negative for cold intolerance, heat intolerance, polydipsia, polyphagia and polyuria.   Genitourinary: Negative for difficulty urinating, dysuria, frequency, hematuria and urgency.   Musculoskeletal: Negative for arthralgias, back pain, gait problem, joint swelling, myalgias, neck pain and neck stiffness.   Skin: Negative for color change, pallor, rash and wound.   Neurological: Negative for dizziness, tremors, weakness, light-headedness, numbness and headaches.   Hematological: Negative for adenopathy. Does not bruise/bleed easily.   Psychiatric/Behavioral: Negative for behavioral problems, confusion and sleep disturbance.        Objective    /70   Pulse (!) 125   Ht 170.2 cm (67\")   Wt 60.8 kg (134 lb 1.6 oz)   SpO2 99%   BMI 21.00 kg/m²   Physical Exam   Constitutional: She is oriented to person, place, and time. She appears well-developed and well-nourished. No distress.   HENT:   Head: Normocephalic and atraumatic.   Right Ear: External ear normal.   Left Ear: External ear normal.   Nose: Nose normal.   Eyes: Pupils are equal, round, and reactive to light. Conjunctivae and EOM are normal.   Neck: Normal range of motion. Neck supple. No tracheal deviation present. No thyromegaly present.   Cardiovascular: Normal rate, regular " rhythm and normal heart sounds.   No murmur heard.  Pulmonary/Chest: Effort normal and breath sounds normal. No respiratory distress. She has no wheezes.   Abdominal: Soft. Bowel sounds are normal. There is no tenderness. There is no rebound and no guarding.   Musculoskeletal: Normal range of motion. She exhibits no edema, tenderness or deformity.   Neurological: She is alert and oriented to person, place, and time. No cranial nerve deficit.   Skin: Skin is warm and dry. No rash noted.   Psychiatric: She has a normal mood and affect. Her behavior is normal. Judgment and thought content normal.       Lab Review  Lab Results   Component Value Date    TSH 1.080 07/10/2020     Lab Results   Component Value Date    FREET4 1.86 (H) 01/23/2020        Assessment/Plan      1. Vitamin D deficiency    2. Hashimoto's disease    . This diagnosis was discussed and reviewed with the patient including the advantages of drug therapy.     1. Orders placed during this encounter include:  Orders Placed This Encounter   Procedures   • Comprehensive Metabolic Panel     Standing Status:   Future     Standing Expiration Date:   7/29/2021   • TSH     Standing Status:   Future     Standing Expiration Date:   7/29/2021   • Vitamin D 25 Hydroxy     Standing Status:   Future     Standing Expiration Date:   7/29/2021   • CBC & Differential     Standing Status:   Future     Standing Expiration Date:   7/29/2021     Order Specific Question:   Manual Differential     Answer:   No       Medications prescribed:  Outpatient Encounter Medications as of 7/29/2020   Medication Sig Dispense Refill   • Cholecalciferol (VITAMIN D) 25 MCG (1000 UT) tablet Take 1 tablet by mouth Daily. 90 tablet 3   • levothyroxine (Synthroid) 100 MCG tablet Take 1 tablet by mouth Daily. 30 tablet 11   • baclofen (LIORESAL) 10 MG tablet Take 1 tablet by mouth 2 (Two) Times a Day. 60 tablet 4     No facility-administered encounter medications on file as of 7/29/2020.       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                 Taking MVI daily --not taking             Component      Latest Ref Rng & Units 7/10/2020   25 Hydroxy, Vitamin D      30.0 - 100.0 ng/ml 34.3                 4. Return in about 3 months (around 10/29/2020) for Recheck.

## 2020-09-02 RX ORDER — LEVOTHYROXINE SODIUM 0.1 MG/1
100 TABLET ORAL DAILY
Qty: 30 TABLET | Refills: 11 | Status: SHIPPED | OUTPATIENT
Start: 2020-09-02 | End: 2020-10-12 | Stop reason: SDUPTHER

## 2020-10-13 RX ORDER — LEVOTHYROXINE SODIUM 0.1 MG/1
100 TABLET ORAL DAILY
Qty: 30 TABLET | Refills: 11 | Status: SHIPPED | OUTPATIENT
Start: 2020-10-13 | End: 2021-04-29

## 2020-10-15 ENCOUNTER — LAB (OUTPATIENT)
Dept: LAB | Facility: HOSPITAL | Age: 25
End: 2020-10-15

## 2020-10-15 ENCOUNTER — TRANSCRIBE ORDERS (OUTPATIENT)
Dept: LAB | Facility: HOSPITAL | Age: 25
End: 2020-10-15

## 2020-10-15 DIAGNOSIS — N28.9 DISORDER OF KIDNEY AND URETER: Primary | ICD-10-CM

## 2020-10-15 PROCEDURE — 80069 RENAL FUNCTION PANEL: CPT | Performed by: INTERNAL MEDICINE

## 2020-10-15 PROCEDURE — 36415 COLL VENOUS BLD VENIPUNCTURE: CPT | Performed by: INTERNAL MEDICINE

## 2020-10-16 LAB
ALBUMIN SERPL-MCNC: 4.6 G/DL (ref 3.5–5.2)
ANION GAP SERPL CALCULATED.3IONS-SCNC: 8.5 MMOL/L (ref 5–15)
BUN SERPL-MCNC: 12 MG/DL (ref 6–20)
BUN/CREAT SERPL: 12.4 (ref 7–25)
CALCIUM SPEC-SCNC: 9.5 MG/DL (ref 8.6–10.5)
CHLORIDE SERPL-SCNC: 105 MMOL/L (ref 98–107)
CO2 SERPL-SCNC: 26.5 MMOL/L (ref 22–29)
CREAT SERPL-MCNC: 0.97 MG/DL (ref 0.57–1)
GFR SERPL CREATININE-BSD FRML MDRD: 69 ML/MIN/1.73
GLUCOSE SERPL-MCNC: 84 MG/DL (ref 65–99)
PHOSPHATE SERPL-MCNC: 3.7 MG/DL (ref 2.5–4.5)
POTASSIUM SERPL-SCNC: 4.3 MMOL/L (ref 3.5–5.2)
SODIUM SERPL-SCNC: 140 MMOL/L (ref 136–145)

## 2020-10-23 ENCOUNTER — LAB (OUTPATIENT)
Dept: LAB | Facility: OTHER | Age: 25
End: 2020-10-23

## 2020-10-23 DIAGNOSIS — E55.9 VITAMIN D DEFICIENCY: ICD-10-CM

## 2020-10-23 DIAGNOSIS — E06.3 HASHIMOTO'S DISEASE: ICD-10-CM

## 2020-10-23 LAB
ALBUMIN SERPL-MCNC: 4.5 G/DL (ref 3.5–5)
ALBUMIN/GLOB SERPL: 1.5 G/DL (ref 1.1–1.8)
ALP SERPL-CCNC: 78 U/L (ref 38–126)
ALT SERPL W P-5'-P-CCNC: 13 U/L
ANION GAP SERPL CALCULATED.3IONS-SCNC: 7 MMOL/L (ref 5–15)
AST SERPL-CCNC: 25 U/L (ref 14–36)
BASOPHILS # BLD AUTO: 0.03 10*3/MM3 (ref 0–0.2)
BASOPHILS NFR BLD AUTO: 0.4 % (ref 0–1.5)
BILIRUB SERPL-MCNC: 0.6 MG/DL (ref 0.2–1.3)
BUN SERPL-MCNC: 5 MG/DL (ref 7–23)
BUN/CREAT SERPL: 5.6 (ref 7–25)
CALCIUM SPEC-SCNC: 9.7 MG/DL (ref 8.4–10.2)
CHLORIDE SERPL-SCNC: 105 MMOL/L (ref 101–112)
CO2 SERPL-SCNC: 26 MMOL/L (ref 22–30)
CREAT SERPL-MCNC: 0.9 MG/DL (ref 0.52–1.04)
DEPRECATED RDW RBC AUTO: 43.8 FL (ref 37–54)
EOSINOPHIL # BLD AUTO: 0.14 10*3/MM3 (ref 0–0.4)
EOSINOPHIL NFR BLD AUTO: 1.9 % (ref 0.3–6.2)
ERYTHROCYTE [DISTWIDTH] IN BLOOD BY AUTOMATED COUNT: 14.4 % (ref 12.3–15.4)
GFR SERPL CREATININE-BSD FRML MDRD: 76 ML/MIN/1.73 (ref 71–165)
GLOBULIN UR ELPH-MCNC: 3 GM/DL (ref 2.3–3.5)
GLUCOSE SERPL-MCNC: 96 MG/DL (ref 70–99)
HCT VFR BLD AUTO: 38.9 % (ref 34–46.6)
HGB BLD-MCNC: 13 G/DL (ref 12–15.9)
LYMPHOCYTES # BLD AUTO: 1.32 10*3/MM3 (ref 0.7–3.1)
LYMPHOCYTES NFR BLD AUTO: 17.6 % (ref 19.6–45.3)
MCH RBC QN AUTO: 28.2 PG (ref 26.6–33)
MCHC RBC AUTO-ENTMCNC: 33.4 G/DL (ref 31.5–35.7)
MCV RBC AUTO: 84.4 FL (ref 79–97)
MONOCYTES # BLD AUTO: 0.48 10*3/MM3 (ref 0.1–0.9)
MONOCYTES NFR BLD AUTO: 6.4 % (ref 5–12)
NEUTROPHILS NFR BLD AUTO: 5.55 10*3/MM3 (ref 1.7–7)
NEUTROPHILS NFR BLD AUTO: 73.7 % (ref 42.7–76)
PLATELET # BLD AUTO: 216 10*3/MM3 (ref 140–450)
PMV BLD AUTO: 8.6 FL (ref 6–12)
POTASSIUM SERPL-SCNC: 4.2 MMOL/L (ref 3.4–5)
PROT SERPL-MCNC: 7.5 G/DL (ref 6.3–8.6)
RBC # BLD AUTO: 4.61 10*6/MM3 (ref 3.77–5.28)
SODIUM SERPL-SCNC: 138 MMOL/L (ref 137–145)
WBC # BLD AUTO: 7.52 10*3/MM3 (ref 3.4–10.8)

## 2020-10-23 PROCEDURE — 84443 ASSAY THYROID STIM HORMONE: CPT | Performed by: NURSE PRACTITIONER

## 2020-10-23 PROCEDURE — 36415 COLL VENOUS BLD VENIPUNCTURE: CPT | Performed by: NURSE PRACTITIONER

## 2020-10-23 PROCEDURE — 80053 COMPREHEN METABOLIC PANEL: CPT | Performed by: NURSE PRACTITIONER

## 2020-10-23 PROCEDURE — 85025 COMPLETE CBC W/AUTO DIFF WBC: CPT | Performed by: NURSE PRACTITIONER

## 2020-10-23 PROCEDURE — 82306 VITAMIN D 25 HYDROXY: CPT | Performed by: NURSE PRACTITIONER

## 2020-10-24 LAB
25(OH)D3 SERPL-MCNC: 35.2 NG/ML (ref 30–100)
TSH SERPL DL<=0.05 MIU/L-ACNC: 1.47 UIU/ML (ref 0.27–4.2)

## 2020-10-29 ENCOUNTER — OFFICE VISIT (OUTPATIENT)
Dept: ENDOCRINOLOGY | Facility: CLINIC | Age: 25
End: 2020-10-29

## 2020-10-29 VITALS
OXYGEN SATURATION: 98 % | WEIGHT: 121.3 LBS | HEIGHT: 66 IN | SYSTOLIC BLOOD PRESSURE: 108 MMHG | BODY MASS INDEX: 19.49 KG/M2 | HEART RATE: 116 BPM | DIASTOLIC BLOOD PRESSURE: 64 MMHG

## 2020-10-29 DIAGNOSIS — E55.9 VITAMIN D DEFICIENCY: ICD-10-CM

## 2020-10-29 DIAGNOSIS — E06.3 HASHIMOTO'S DISEASE: Primary | ICD-10-CM

## 2020-10-29 PROCEDURE — 99213 OFFICE O/P EST LOW 20 MIN: CPT | Performed by: NURSE PRACTITIONER

## 2020-10-29 RX ORDER — METHYLPHENIDATE HYDROCHLORIDE 54 MG/1
54 TABLET ORAL EVERY MORNING
COMMUNITY
End: 2021-01-08

## 2020-10-29 NOTE — PROGRESS NOTES
Melissa Padilla is a 25 y.o. female. she is here today for follow-up.    History of Present Illness     IN OFFICE VISIT      25 year old female presents for follow up thyroid         REASON --- hypothyroidism         Duration  - since 2015      Timing - constant     Quality -controlled      Severity - mild     Context - diagnosed with routine lab work      Location/size - thyroid      THYROID ULTRASOUND     HISTORY: Hypothyroidism. Acquired hypothyroidism.     The thyroid was scanned with a small parts transducer.     COMPARISON: None     FINDINGS:  The right lobe measures 4.23 cm in length by 1.13 cm AP by 1.23  cm transverse.  The left lobe measures 4.30 cm in length by 1.28 cm AP by 1.14 cm  transverse.  Unremarkable isthmus.     Thyroid of inhomogeneous echotexture.  No focal nodule or mass.     IMPRESSION:  CONCLUSION:  Heterogeneous thyroid which could represent thyroiditis.  No focal nodule or mass.     [id number]     Electronically signed by:  Jm Lee MD  7/26/2019 1:35 PM CDT  Workstation: [id number]        Quantity -               Lab Results   Component Value Date     TSH 2.350 04/22/2020            Lab Results   Component Value Date    TSH 1.470 10/23/2020                Symptoms -  none today         Alleviating Factors - levothyroxine 100 mcg one daily            She takes the medication alone with no other medications or food      Aggravating Factors - none         history and physical reviewed no new changes noted        Evaluation history:  TSH   Date Value Ref Range Status   10/23/2020 1.470 0.270 - 4.200 uIU/mL Final     Free T4   Date Value Ref Range Status   01/23/2020 1.86 (H) 0.93 - 1.70 ng/dL Final       Current medications:  Current Outpatient Medications   Medication Sig Dispense Refill   • Cholecalciferol (VITAMIN D) 25 MCG (1000 UT) tablet Take 1 tablet by mouth Daily. 90 tablet 3   • levothyroxine (Synthroid) 100 MCG tablet Take 1 tablet by mouth Daily. 30 tablet  11   • methylphenidate (Concerta) 54 MG CR tablet Take 54 mg by mouth Every Morning       No current facility-administered medications for this visit.        The following portions of the patient's history were reviewed and updated as appropriate:   Past Medical History:   Diagnosis Date   • Acute tonsillitis    • Bronchitis    • Cellulitis     History of abscess, near left axilla      • Contusion of knee    • Depressive disorder    • Diarrhea    • Disease of thyroid gland    • Dysmenorrhea 9/28/2017   • Dysuria    • Encounter for gynecological examination (general) (routine) without abnormal findings    • Encounter for removal of sutures    • Encounter for surveillance of contraceptive pills    • Folliculitis    • Fracture of phalanx of hand     Left thumb      • Gastroenteritis    • Generalized anxiety disorder    • Hypothyroidism, unspecified    • Irregular periods    • Kidney stone    • Knee pain    • Lipoma    • Ovarian cyst    • Pain in joint, ankle and foot    • Pain in wrist    • Panic disorder    • Ruptured cyst of ovary    • Sports physical    • Sprain of ankle    • Strain of neck muscle    • Tick bite    • Upper respiratory infection      Past Surgical History:   Procedure Laterality Date   • ADENOIDECTOMY     • INCISION AND DRAINAGE ABSCESS  02/04/2009    Left axillary abscess   • INJECTION OF MEDICATION  05/31/2014    Toradol(1)   • INJECTION OF MEDICATION  05/31/2014    Zofran(1)   • KIDNEY STONE SURGERY     • TONSILLECTOMY       Family History   Problem Relation Age of Onset   • Cancer Other    • Diabetes Other    • Heart disease Other    • Hypertension Other    • Thyroid disease Other      OB History    No obstetric history on file.       No Known Allergies  Social History     Socioeconomic History   • Marital status: Single     Spouse name: Not on file   • Number of children: Not on file   • Years of education: Not on file   • Highest education level: Not on file   Tobacco Use   • Smoking status:  "Never Smoker   • Smokeless tobacco: Never Used   Substance and Sexual Activity   • Alcohol use: No   • Drug use: No   • Sexual activity: Not Currently     Birth control/protection: None       Review of Systems  Review of Systems   Constitutional: Negative for activity change, appetite change, diaphoresis and fatigue.   HENT: Negative for facial swelling, sneezing, sore throat, tinnitus, trouble swallowing and voice change.    Eyes: Negative for photophobia, pain, discharge, redness, itching and visual disturbance.   Respiratory: Negative for apnea, cough, choking, chest tightness and shortness of breath.    Cardiovascular: Negative for chest pain, palpitations and leg swelling.   Gastrointestinal: Negative for abdominal distention, abdominal pain, constipation, diarrhea, nausea and vomiting.   Endocrine: Negative for cold intolerance, heat intolerance, polydipsia, polyphagia and polyuria.   Genitourinary: Negative for difficulty urinating, dysuria, frequency, hematuria and urgency.   Musculoskeletal: Negative for arthralgias, back pain, gait problem, joint swelling, myalgias, neck pain and neck stiffness.   Skin: Negative for color change, pallor, rash and wound.   Neurological: Negative for dizziness, tremors, weakness, light-headedness, numbness and headaches.   Hematological: Negative for adenopathy. Does not bruise/bleed easily.   Psychiatric/Behavioral: Negative for behavioral problems, confusion and sleep disturbance.        Objective    /64   Pulse 116   Ht 167.6 cm (66\")   Wt 55 kg (121 lb 4.8 oz)   SpO2 98%   BMI 19.58 kg/m²   Physical Exam  Constitutional:       General: She is not in acute distress.     Appearance: She is well-developed.   HENT:      Head: Normocephalic and atraumatic.      Right Ear: External ear normal.      Left Ear: External ear normal.      Nose: Nose normal.   Eyes:      Conjunctiva/sclera: Conjunctivae normal.      Pupils: Pupils are equal, round, and reactive to light. "   Neck:      Musculoskeletal: Normal range of motion and neck supple.      Thyroid: No thyromegaly.      Trachea: No tracheal deviation.   Cardiovascular:      Rate and Rhythm: Normal rate and regular rhythm.      Heart sounds: Normal heart sounds. No murmur.   Pulmonary:      Effort: Pulmonary effort is normal. No respiratory distress.      Breath sounds: Normal breath sounds. No wheezing.   Abdominal:      General: Bowel sounds are normal.      Palpations: Abdomen is soft.      Tenderness: There is no abdominal tenderness. There is no guarding or rebound.   Musculoskeletal: Normal range of motion.         General: No tenderness or deformity.   Skin:     General: Skin is warm and dry.      Findings: No rash.   Neurological:      Mental Status: She is alert and oriented to person, place, and time.      Cranial Nerves: No cranial nerve deficit.   Psychiatric:         Behavior: Behavior normal.         Thought Content: Thought content normal.         Judgment: Judgment normal.         Lab Review  Lab Results   Component Value Date    TSH 1.470 10/23/2020     Lab Results   Component Value Date    FREET4 1.86 (H) 01/23/2020        Assessment/Plan      1. Hashimoto's disease    2. Vitamin D deficiency    . This diagnosis was discussed and reviewed with the patient including the advantages of drug therapy.     1. Orders placed during this encounter include:  No orders of the defined types were placed in this encounter.      Medications prescribed:  Outpatient Encounter Medications as of 10/29/2020   Medication Sig Dispense Refill   • Cholecalciferol (VITAMIN D) 25 MCG (1000 UT) tablet Take 1 tablet by mouth Daily. 90 tablet 3   • levothyroxine (Synthroid) 100 MCG tablet Take 1 tablet by mouth Daily. 30 tablet 11   • methylphenidate (Concerta) 54 MG CR tablet Take 54 mg by mouth Every Morning     • [DISCONTINUED] baclofen (LIORESAL) 10 MG tablet Take 1 tablet by mouth 2 (Two) Times a Day. 60 tablet 4     No  facility-administered encounter medications on file as of 10/29/2020.      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        Taking MVI daily with vitamin d                 Component      Latest Ref Rng & Units 10/23/2020   25 Hydroxy, Vitamin D      30.0 - 100.0 ng/ml 35.2                  4. Return in about 3 months (around 1/29/2021).

## 2021-01-08 ENCOUNTER — OFFICE VISIT (OUTPATIENT)
Dept: FAMILY MEDICINE CLINIC | Facility: CLINIC | Age: 26
End: 2021-01-08

## 2021-01-08 ENCOUNTER — LAB (OUTPATIENT)
Dept: LAB | Facility: HOSPITAL | Age: 26
End: 2021-01-08

## 2021-01-08 VITALS
HEIGHT: 66 IN | DIASTOLIC BLOOD PRESSURE: 62 MMHG | BODY MASS INDEX: 19.29 KG/M2 | SYSTOLIC BLOOD PRESSURE: 110 MMHG | TEMPERATURE: 96.8 F | WEIGHT: 120 LBS

## 2021-01-08 DIAGNOSIS — I99.8 VASCULAR INSUFFICIENCY: ICD-10-CM

## 2021-01-08 DIAGNOSIS — E03.9 HYPOTHYROIDISM, UNSPECIFIED TYPE: Primary | ICD-10-CM

## 2021-01-08 DIAGNOSIS — R53.83 MALAISE AND FATIGUE: ICD-10-CM

## 2021-01-08 DIAGNOSIS — F41.9 ANXIETY: ICD-10-CM

## 2021-01-08 DIAGNOSIS — R53.81 MALAISE AND FATIGUE: ICD-10-CM

## 2021-01-08 PROCEDURE — 36415 COLL VENOUS BLD VENIPUNCTURE: CPT | Performed by: NURSE PRACTITIONER

## 2021-01-08 PROCEDURE — 82306 VITAMIN D 25 HYDROXY: CPT | Performed by: NURSE PRACTITIONER

## 2021-01-08 PROCEDURE — 99214 OFFICE O/P EST MOD 30 MIN: CPT | Performed by: NURSE PRACTITIONER

## 2021-01-08 PROCEDURE — 83540 ASSAY OF IRON: CPT | Performed by: NURSE PRACTITIONER

## 2021-01-08 PROCEDURE — 86038 ANTINUCLEAR ANTIBODIES: CPT | Performed by: NURSE PRACTITIONER

## 2021-01-08 PROCEDURE — 82607 VITAMIN B-12: CPT | Performed by: NURSE PRACTITIONER

## 2021-01-08 PROCEDURE — 86431 RHEUMATOID FACTOR QUANT: CPT | Performed by: NURSE PRACTITIONER

## 2021-01-08 PROCEDURE — 80053 COMPREHEN METABOLIC PANEL: CPT | Performed by: NURSE PRACTITIONER

## 2021-01-08 PROCEDURE — 84443 ASSAY THYROID STIM HORMONE: CPT | Performed by: NURSE PRACTITIONER

## 2021-01-08 PROCEDURE — 85025 COMPLETE CBC W/AUTO DIFF WBC: CPT | Performed by: NURSE PRACTITIONER

## 2021-01-08 PROCEDURE — 83735 ASSAY OF MAGNESIUM: CPT | Performed by: NURSE PRACTITIONER

## 2021-01-08 RX ORDER — DEXTROAMPHETAMINE SACCHARATE, AMPHETAMINE ASPARTATE, DEXTROAMPHETAMINE SULFATE AND AMPHETAMINE SULFATE 5; 5; 5; 5 MG/1; MG/1; MG/1; MG/1
1 TABLET ORAL 2 TIMES DAILY
COMMUNITY
Start: 2020-12-30 | End: 2022-02-01

## 2021-01-08 NOTE — PROGRESS NOTES
Chief Complaint   Patient presents with   • Weight Check   • Memory Loss     short term     Subjective   Lala Padilla is a 26 y.o. female.     Weight loss 14 pounds     Memory Loss  This is a recurrent problem. The current episode started more than 1 year ago. The problem occurs intermittently. The problem has been gradually worsening. Associated symptoms include arthralgias, fatigue, joint swelling and myalgias. Pertinent negatives include no abdominal pain, anorexia, change in bowel habit, chest pain, chills, congestion, coughing, diaphoresis, headaches, numbness, rash, sore throat, swollen glands, urinary symptoms, vertigo, visual change, vomiting or weakness.   Hypothyroidism  This is a chronic problem. The current episode started more than 1 year ago. The problem has been waxing and waning. Associated symptoms include arthralgias, fatigue, joint swelling and myalgias. Pertinent negatives include no abdominal pain, anorexia, change in bowel habit, chest pain, chills, congestion, coughing, diaphoresis, headaches, numbness, rash, sore throat, swollen glands, urinary symptoms, vertigo, visual change, vomiting or weakness. The symptoms are aggravated by stress. She has tried rest (synthroid ) for the symptoms. The treatment provided mild relief.   Fatigue  This is a recurrent problem. The current episode started 1 to 4 weeks ago. The problem occurs every several days. The problem has been resolved. Associated symptoms include arthralgias, fatigue, joint swelling and myalgias. Pertinent negatives include no abdominal pain, anorexia, change in bowel habit, chest pain, chills, congestion, coughing, diaphoresis, headaches, numbness, rash, sore throat, swollen glands, urinary symptoms, vertigo, visual change, vomiting or weakness. She has tried acetaminophen for the symptoms. The treatment provided mild relief.   Thyroid Problem  Presents for follow-up visit. Symptoms include depressed mood, dry skin and  fatigue. Patient reports no anxiety, cold intolerance, constipation, diaphoresis, diarrhea, hair loss, heat intolerance, hoarse voice, leg swelling, menstrual problem, nail problem, palpitations, tremors, visual change, weight gain or weight loss. The symptoms have been stable.   Anxiety  Presents for follow-up visit. Symptoms include decreased concentration, depressed mood, excessive worry and panic. Patient reports no chest pain, compulsions, confusion, dizziness, dry mouth, feeling of choking, hyperventilation, impotence, insomnia, irritability, malaise, muscle tension, nervous/anxious behavior, obsessions, palpitations, restlessness, shortness of breath or suicidal ideas. Symptoms occur most days. The quality of sleep is good. Nighttime awakenings: none.     Compliance with medications is %.        The following portions of the patient's history were reviewed and updated as appropriate: allergies, current medications, past social history and problem list.    Review of Systems   Constitutional: Positive for activity change and fatigue. Negative for appetite change, chills, diaphoresis, irritability, unexpected weight change, weight gain and weight loss.   HENT: Negative for congestion, dental problem, drooling, ear discharge, ear pain, facial swelling, hearing loss, hoarse voice, mouth sores, nosebleeds, postnasal drip, rhinorrhea, sinus pressure, sinus pain, sneezing and sore throat.         Reports trouble swallowing on and off    Eyes: Negative.  Negative for photophobia, pain, discharge, redness, itching and visual disturbance.   Respiratory: Negative.  Negative for apnea, cough, choking, chest tightness, shortness of breath, wheezing and stridor.    Cardiovascular: Negative.  Negative for chest pain, palpitations and leg swelling.   Gastrointestinal: Negative.  Negative for abdominal distention, abdominal pain, anal bleeding, anorexia, blood in stool, change in bowel habit, constipation, diarrhea,  "rectal pain and vomiting.   Endocrine: Negative for cold intolerance, heat intolerance, polydipsia, polyphagia and polyuria.   Genitourinary: Negative.  Negative for difficulty urinating, dyspareunia, dysuria, genital sores, hematuria, impotence and menstrual problem.   Musculoskeletal: Positive for arthralgias, back pain, gait problem, joint swelling and myalgias. Negative for neck stiffness.        Reports feet turning blue on and off    Skin: Positive for color change and pallor. Negative for rash and wound.        Reports color changes extremities    Allergic/Immunologic: Negative.  Negative for environmental allergies, food allergies and immunocompromised state.   Neurological: Negative for dizziness, vertigo, tremors, seizures, syncope, speech difficulty, weakness, light-headedness, numbness and headaches.   Hematological: Negative.  Negative for adenopathy. Does not bruise/bleed easily.   Psychiatric/Behavioral: Positive for decreased concentration and sleep disturbance. Negative for agitation, behavioral problems, confusion, dysphoric mood, hallucinations, self-injury and suicidal ideas. The patient is not nervous/anxious, does not have insomnia and is not hyperactive.         Currently treating for add        Objective   /62   Temp 96.8 °F (36 °C) (Tympanic)   Ht 167.6 cm (66\")   Wt 54.4 kg (120 lb)   BMI 19.37 kg/m²   Physical Exam  Vitals signs and nursing note reviewed.   Constitutional:       Appearance: Normal appearance.   HENT:      Head: Normocephalic.      Right Ear: Tympanic membrane normal.      Nose: Nose normal.      Mouth/Throat:      Mouth: Mucous membranes are moist.   Eyes:      Pupils: Pupils are equal, round, and reactive to light.   Neck:      Musculoskeletal: Normal range of motion.   Cardiovascular:      Pulses: Normal pulses.   Pulmonary:      Effort: Pulmonary effort is normal.   Musculoskeletal: Normal range of motion.   Skin:     General: Skin is warm.      Coloration: " Skin is not jaundiced or pale.      Findings: No bruising, erythema, lesion or rash.      Comments: Discolored toes and fingers probably raynauds    Neurological:      Mental Status: She is alert.         Assessment/Plan   Problems Addressed this Visit        Endocrine and Metabolic    Hypothyroidism - Primary    Relevant Orders    TSH    Vitamin B12    Vitamin D 25 Hydroxy    Magnesium    Iron    CBC & Differential    Comprehensive Metabolic Panel    CRUZ    Rheumatoid Factor    CBC Auto Differential       Mental Health    Anxiety       Symptoms and Signs    Malaise and fatigue    Relevant Orders    TSH    Vitamin B12    Vitamin D 25 Hydroxy    Magnesium    Iron    CBC & Differential    Comprehensive Metabolic Panel    CRUZ    Rheumatoid Factor    CBC Auto Differential      Other Visit Diagnoses     Vascular insufficiency        Relevant Orders    CRUZ    Rheumatoid Factor      Diagnoses       Codes Comments    Hypothyroidism, unspecified type    -  Primary ICD-10-CM: E03.9  ICD-9-CM: 244.9     Malaise and fatigue     ICD-10-CM: R53.81, R53.83  ICD-9-CM: 780.79     Vascular insufficiency     ICD-10-CM: I99.8  ICD-9-CM: 459.9     Anxiety     ICD-10-CM: F41.9  ICD-9-CM: 300.00          No orders of the defined types were placed in this encounter.    Increase calories    labs to rule out certain disease processes-meds as directed, considering decreasing dosage of adderall due to weight loss -probably raynauds hands and feet--will notify of labs, increase calories for now and notify provider she is receiving add treatment for the weight loss

## 2021-01-09 LAB
25(OH)D3 SERPL-MCNC: 36.4 NG/ML (ref 30–100)
ALBUMIN SERPL-MCNC: 4.9 G/DL (ref 3.5–5.2)
ALBUMIN/GLOB SERPL: 2 G/DL
ALP SERPL-CCNC: 68 U/L (ref 39–117)
ALT SERPL W P-5'-P-CCNC: 15 U/L (ref 1–33)
ANION GAP SERPL CALCULATED.3IONS-SCNC: 7.1 MMOL/L (ref 5–15)
AST SERPL-CCNC: 20 U/L (ref 1–32)
BASOPHILS # BLD AUTO: 0.06 10*3/MM3 (ref 0–0.2)
BASOPHILS NFR BLD AUTO: 0.7 % (ref 0–1.5)
BILIRUB SERPL-MCNC: 0.5 MG/DL (ref 0–1.2)
BUN SERPL-MCNC: 9 MG/DL (ref 6–20)
BUN/CREAT SERPL: 10.2 (ref 7–25)
CALCIUM SPEC-SCNC: 9.6 MG/DL (ref 8.6–10.5)
CHLORIDE SERPL-SCNC: 104 MMOL/L (ref 98–107)
CHROMATIN AB SERPL-ACNC: <10 IU/ML (ref 0–14)
CO2 SERPL-SCNC: 27.9 MMOL/L (ref 22–29)
CREAT SERPL-MCNC: 0.88 MG/DL (ref 0.57–1)
DEPRECATED RDW RBC AUTO: 38.9 FL (ref 37–54)
EOSINOPHIL # BLD AUTO: 0.23 10*3/MM3 (ref 0–0.4)
EOSINOPHIL NFR BLD AUTO: 2.8 % (ref 0.3–6.2)
ERYTHROCYTE [DISTWIDTH] IN BLOOD BY AUTOMATED COUNT: 13.3 % (ref 12.3–15.4)
GFR SERPL CREATININE-BSD FRML MDRD: 77 ML/MIN/1.73
GLOBULIN UR ELPH-MCNC: 2.5 GM/DL
GLUCOSE SERPL-MCNC: 79 MG/DL (ref 65–99)
HCT VFR BLD AUTO: 39 % (ref 34–46.6)
HGB BLD-MCNC: 13.7 G/DL (ref 12–15.9)
IMM GRANULOCYTES # BLD AUTO: 0.01 10*3/MM3 (ref 0–0.05)
IMM GRANULOCYTES NFR BLD AUTO: 0.1 % (ref 0–0.5)
IRON 24H UR-MRATE: 63 MCG/DL (ref 37–145)
LYMPHOCYTES # BLD AUTO: 1.55 10*3/MM3 (ref 0.7–3.1)
LYMPHOCYTES NFR BLD AUTO: 18.7 % (ref 19.6–45.3)
MAGNESIUM SERPL-MCNC: 2.1 MG/DL (ref 1.6–2.6)
MCH RBC QN AUTO: 28.7 PG (ref 26.6–33)
MCHC RBC AUTO-ENTMCNC: 35.1 G/DL (ref 31.5–35.7)
MCV RBC AUTO: 81.8 FL (ref 79–97)
MONOCYTES # BLD AUTO: 0.44 10*3/MM3 (ref 0.1–0.9)
MONOCYTES NFR BLD AUTO: 5.3 % (ref 5–12)
NEUTROPHILS NFR BLD AUTO: 5.99 10*3/MM3 (ref 1.7–7)
NEUTROPHILS NFR BLD AUTO: 72.4 % (ref 42.7–76)
NRBC BLD AUTO-RTO: 0 /100 WBC (ref 0–0.2)
PLATELET # BLD AUTO: 265 10*3/MM3 (ref 140–450)
PMV BLD AUTO: 9.6 FL (ref 6–12)
POTASSIUM SERPL-SCNC: 4.3 MMOL/L (ref 3.5–5.2)
PROT SERPL-MCNC: 7.4 G/DL (ref 6–8.5)
RBC # BLD AUTO: 4.77 10*6/MM3 (ref 3.77–5.28)
SODIUM SERPL-SCNC: 139 MMOL/L (ref 136–145)
TSH SERPL DL<=0.05 MIU/L-ACNC: 2.5 UIU/ML (ref 0.27–4.2)
VIT B12 BLD-MCNC: 483 PG/ML (ref 211–946)
WBC # BLD AUTO: 8.28 10*3/MM3 (ref 3.4–10.8)

## 2021-01-11 ENCOUNTER — TELEPHONE (OUTPATIENT)
Dept: FAMILY MEDICINE CLINIC | Facility: CLINIC | Age: 26
End: 2021-01-11

## 2021-01-11 LAB — ANA SER QL: NEGATIVE

## 2021-01-12 NOTE — TELEPHONE ENCOUNTER
Per MILLY Guevara, Ms. Brothers has been called with recent lab results & recommendations.  Continue current medications and follow-up as planned or sooner if any problems.      ----- Message from MILLY Keen sent at 1/11/2021  7:55 AM CST -----  Can you let her know normal -labs are good ra neg

## 2021-01-12 NOTE — PROGRESS NOTES
Per MILLY Guevara, Ms. Randy has been called with recent lab results & recommendations.  Continue current medications and follow-up as planned or sooner if any problems.

## 2021-01-25 ENCOUNTER — LAB (OUTPATIENT)
Dept: LAB | Facility: HOSPITAL | Age: 26
End: 2021-01-25

## 2021-01-25 DIAGNOSIS — E55.9 VITAMIN D DEFICIENCY: ICD-10-CM

## 2021-01-25 DIAGNOSIS — E06.3 HASHIMOTO'S DISEASE: ICD-10-CM

## 2021-01-25 PROCEDURE — 82607 VITAMIN B-12: CPT

## 2021-01-25 PROCEDURE — 84443 ASSAY THYROID STIM HORMONE: CPT

## 2021-01-25 PROCEDURE — 82306 VITAMIN D 25 HYDROXY: CPT

## 2021-01-25 PROCEDURE — 80053 COMPREHEN METABOLIC PANEL: CPT

## 2021-01-25 PROCEDURE — 36415 COLL VENOUS BLD VENIPUNCTURE: CPT

## 2021-01-25 PROCEDURE — 85025 COMPLETE CBC W/AUTO DIFF WBC: CPT

## 2021-01-26 LAB
25(OH)D3 SERPL-MCNC: 33.2 NG/ML (ref 30–100)
ALBUMIN SERPL-MCNC: 4.2 G/DL (ref 3.5–5.2)
ALBUMIN/GLOB SERPL: 1.8 G/DL
ALP SERPL-CCNC: 71 U/L (ref 39–117)
ALT SERPL W P-5'-P-CCNC: 15 U/L (ref 1–33)
ANION GAP SERPL CALCULATED.3IONS-SCNC: 8.5 MMOL/L (ref 5–15)
AST SERPL-CCNC: 17 U/L (ref 1–32)
BASOPHILS # BLD AUTO: 0.04 10*3/MM3 (ref 0–0.2)
BASOPHILS NFR BLD AUTO: 0.5 % (ref 0–1.5)
BILIRUB SERPL-MCNC: 0.3 MG/DL (ref 0–1.2)
BUN SERPL-MCNC: 11 MG/DL (ref 6–20)
BUN/CREAT SERPL: 12.5 (ref 7–25)
CALCIUM SPEC-SCNC: 9.5 MG/DL (ref 8.6–10.5)
CHLORIDE SERPL-SCNC: 103 MMOL/L (ref 98–107)
CO2 SERPL-SCNC: 26.5 MMOL/L (ref 22–29)
CREAT SERPL-MCNC: 0.88 MG/DL (ref 0.57–1)
DEPRECATED RDW RBC AUTO: 40 FL (ref 37–54)
EOSINOPHIL # BLD AUTO: 0.23 10*3/MM3 (ref 0–0.4)
EOSINOPHIL NFR BLD AUTO: 2.7 % (ref 0.3–6.2)
ERYTHROCYTE [DISTWIDTH] IN BLOOD BY AUTOMATED COUNT: 13.3 % (ref 12.3–15.4)
GFR SERPL CREATININE-BSD FRML MDRD: 77 ML/MIN/1.73
GLOBULIN UR ELPH-MCNC: 2.4 GM/DL
GLUCOSE SERPL-MCNC: 75 MG/DL (ref 65–99)
HCT VFR BLD AUTO: 38.4 % (ref 34–46.6)
HGB BLD-MCNC: 13.2 G/DL (ref 12–15.9)
IMM GRANULOCYTES # BLD AUTO: 0.03 10*3/MM3 (ref 0–0.05)
IMM GRANULOCYTES NFR BLD AUTO: 0.3 % (ref 0–0.5)
LYMPHOCYTES # BLD AUTO: 1.3 10*3/MM3 (ref 0.7–3.1)
LYMPHOCYTES NFR BLD AUTO: 15 % (ref 19.6–45.3)
MCH RBC QN AUTO: 28.6 PG (ref 26.6–33)
MCHC RBC AUTO-ENTMCNC: 34.4 G/DL (ref 31.5–35.7)
MCV RBC AUTO: 83.1 FL (ref 79–97)
MONOCYTES # BLD AUTO: 0.49 10*3/MM3 (ref 0.1–0.9)
MONOCYTES NFR BLD AUTO: 5.7 % (ref 5–12)
NEUTROPHILS NFR BLD AUTO: 6.55 10*3/MM3 (ref 1.7–7)
NEUTROPHILS NFR BLD AUTO: 75.8 % (ref 42.7–76)
NRBC BLD AUTO-RTO: 0 /100 WBC (ref 0–0.2)
PLATELET # BLD AUTO: 241 10*3/MM3 (ref 140–450)
PMV BLD AUTO: 9.6 FL (ref 6–12)
POTASSIUM SERPL-SCNC: 4 MMOL/L (ref 3.5–5.2)
PROT SERPL-MCNC: 6.6 G/DL (ref 6–8.5)
RBC # BLD AUTO: 4.62 10*6/MM3 (ref 3.77–5.28)
SODIUM SERPL-SCNC: 138 MMOL/L (ref 136–145)
TSH SERPL DL<=0.05 MIU/L-ACNC: 2.45 UIU/ML (ref 0.27–4.2)
VIT B12 BLD-MCNC: 470 PG/ML (ref 211–946)
WBC # BLD AUTO: 8.64 10*3/MM3 (ref 3.4–10.8)

## 2021-01-29 ENCOUNTER — OFFICE VISIT (OUTPATIENT)
Dept: ENDOCRINOLOGY | Facility: CLINIC | Age: 26
End: 2021-01-29

## 2021-01-29 VITALS
HEART RATE: 59 BPM | SYSTOLIC BLOOD PRESSURE: 108 MMHG | DIASTOLIC BLOOD PRESSURE: 64 MMHG | BODY MASS INDEX: 19.54 KG/M2 | HEIGHT: 66 IN | WEIGHT: 121.6 LBS | OXYGEN SATURATION: 99 %

## 2021-01-29 DIAGNOSIS — E55.9 VITAMIN D DEFICIENCY: Primary | ICD-10-CM

## 2021-01-29 DIAGNOSIS — E03.8 HYPOTHYROIDISM DUE TO HASHIMOTO'S THYROIDITIS: ICD-10-CM

## 2021-01-29 DIAGNOSIS — E06.3 HYPOTHYROIDISM DUE TO HASHIMOTO'S THYROIDITIS: ICD-10-CM

## 2021-01-29 PROCEDURE — 99213 OFFICE O/P EST LOW 20 MIN: CPT | Performed by: NURSE PRACTITIONER

## 2021-01-29 NOTE — PROGRESS NOTES
"Chief Complaint  Hashimoto's Thyroiditis    Subjective          Lala Padilla presents to [facility] ENDOCRINOLOG[facility] for   History of Present Illness     IN OFFICE VISIT     26-year-old female presents for follow-up    Reason hypothyroidism        duration since 2015     Timing constant       Quality controlled           Severity - mild     Context - diagnosed with routine lab work      Location/size - thyroid      THYROID ULTRASOUND     HISTORY: Hypothyroidism. Acquired hypothyroidism.     The thyroid was scanned with a small parts transducer.     COMPARISON: None     FINDINGS:  The right lobe measures 4.23 cm in length by 1.13 cm AP by 1.23  cm transverse.  The left lobe measures 4.30 cm in length by 1.28 cm AP by 1.14 cm  transverse.  Unremarkable isthmus.     Thyroid of inhomogeneous echotexture.  No focal nodule or mass.     IMPRESSION:  CONCLUSION:  Heterogeneous thyroid which could represent thyroiditis.  No focal nodule or mass.     [id number]     Electronically signed by:  Jm Lee MD  7/26/2019 1:35 PM CDT  Workstation: [id number]        Quantity -     Lab Results   Component Value Date    TSH 2.450 01/25/2021                Symptoms -  none today         Alleviating Factors - levothyroxine 100 mcg one daily            She takes the medication alone with no other medications or food      Aggravating Factors - none           Objective   Vital Signs:   /64   Pulse 59   Ht 167.6 cm (66\")   Wt 55.2 kg (121 lb 9.6 oz)   SpO2 99%   BMI 19.63 kg/m²     Physical Exam  Constitutional:       Appearance: Normal appearance.   HENT:      Head: Normocephalic.      Right Ear: External ear normal.      Left Ear: External ear normal.   Eyes:      Conjunctiva/sclera: Conjunctivae normal.      Pupils: Pupils are equal, round, and reactive to light.   Neck:      Musculoskeletal: Normal range of motion and neck supple.   Skin:     Coloration: Skin is not pale.   Neurological:      General: " No focal deficit present.      Mental Status: She is alert and oriented to person, place, and time.   Psychiatric:         Mood and Affect: Mood normal.         Thought Content: Thought content normal.        Result Review :   The following data was reviewed by: MILLY Alcala on 01/29/2021:  CMP    CMP 10/23/20 1/8/21 1/25/21   BUN 5 (A) 9 11   Creatinine 0.90 0.88 0.88   eGFR Non African Am 76 77 77   Sodium 138 139 138   Potassium 4.2 4.3 4.0   Chloride 105 104 103   Calcium 9.7 9.6 9.5   Albumin 4.50 4.90 4.20   Total Bilirubin 0.6 0.5 0.3   Alkaline Phosphatase 78 68 71   AST (SGOT) 25 20 17   ALT (SGPT) 13 15 15   (A) Abnormal value            CBC    CBC 10/23/20 1/8/21 1/25/21   WBC 7.52 8.28 8.64   RBC 4.61 4.77 4.62   Hemoglobin 13.0 13.7 13.2   Hematocrit 38.9 39.0 38.4   MCV 84.4 81.8 83.1   MCH 28.2 28.7 28.6   MCHC 33.4 35.1 34.4   RDW 14.4 13.3 13.3   Platelets 216 265 241           TSH    TSH 10/23/20 1/8/21 1/25/21   TSH 1.470 2.500 2.450                     Assessment and Plan    Problem List Items Addressed This Visit        Other    Hypothyroidism    Relevant Orders    CBC & Differential    Comprehensive Metabolic Panel    TSH    Vitamin D 25 Hydroxy    Vitamin D deficiency - Primary    Relevant Orders    CBC & Differential    Comprehensive Metabolic Panel    TSH    Vitamin D 25 Hydroxy            Hypothyroidism due to hashimoto's               Taking Levothyroxine 100 mcg      Lab Results   Component Value Date    TSH 2.450 01/25/2021       Bone Health     Vitamin d def..        Taking MVI daily with vitamin d         Component      Latest Ref Rng & Units 1/25/2021   25 Hydroxy, Vitamin D      30.0 - 100.0 ng/ml 33.2                        Follow Up   Return in about 3 months (around 4/29/2021) for Recheck.  Patient was given instructions and counseling regarding her condition or for health maintenance advice. Please see specific information pulled into the AVS if appropriate.

## 2021-02-05 ENCOUNTER — OFFICE VISIT (OUTPATIENT)
Dept: FAMILY MEDICINE CLINIC | Facility: CLINIC | Age: 26
End: 2021-02-05

## 2021-02-05 ENCOUNTER — LAB (OUTPATIENT)
Dept: LAB | Facility: HOSPITAL | Age: 26
End: 2021-02-05

## 2021-02-05 VITALS
WEIGHT: 120 LBS | SYSTOLIC BLOOD PRESSURE: 100 MMHG | BODY MASS INDEX: 19.29 KG/M2 | TEMPERATURE: 97.1 F | HEIGHT: 66 IN | DIASTOLIC BLOOD PRESSURE: 72 MMHG

## 2021-02-05 DIAGNOSIS — Z01.419 GYNECOLOGIC EXAM NORMAL: Primary | ICD-10-CM

## 2021-02-05 DIAGNOSIS — N89.8 DISCHARGE FROM THE VAGINA: ICD-10-CM

## 2021-02-05 PROCEDURE — 99213 OFFICE O/P EST LOW 20 MIN: CPT | Performed by: NURSE PRACTITIONER

## 2021-02-05 PROCEDURE — 87480 CANDIDA DNA DIR PROBE: CPT | Performed by: NURSE PRACTITIONER

## 2021-02-05 PROCEDURE — 87510 GARDNER VAG DNA DIR PROBE: CPT | Performed by: NURSE PRACTITIONER

## 2021-02-05 PROCEDURE — 87660 TRICHOMONAS VAGIN DIR PROBE: CPT | Performed by: NURSE PRACTITIONER

## 2021-02-05 NOTE — PROGRESS NOTES
Chief Complaint   Patient presents with   • Annual Exam     pap smear     Subjective   Lala Padilla is a 26 y.o. female.     Gynecologic Exam  The patient's primary symptoms include vaginal discharge. The patient's pertinent negatives include no genital itching, genital lesions, genital odor, genital rash, missed menses, pelvic pain or vaginal bleeding. The problem occurs daily. The problem has been resolved. The pain is mild. She is not pregnant. Associated symptoms include rash. Pertinent negatives include no abdominal pain, anorexia, back pain, chills, constipation, diarrhea, discolored urine, dysuria, fever, flank pain, frequency, headaches, hematuria, joint pain, joint swelling, nausea, painful intercourse, sore throat, urgency or vomiting. The treatment provided mild relief. She is sexually active. No, her partner does not have an STD. Her menstrual history has been regular. There is no history of an abdominal surgery, a  section, an ectopic pregnancy, endometriosis, a gynecological surgery, menorrhagia, metrorrhagia, miscarriage, perineal abscess, PID, an STD or a terminated pregnancy.   Rash  This is a recurrent problem. The current episode started 1 to 4 weeks ago. The problem has been gradually worsening since onset. Location: groin. The rash is characterized by redness. Pertinent negatives include no anorexia, congestion, cough, diarrhea, eye pain, facial edema, fatigue, fever, joint pain, rhinorrhea, shortness of breath, sore throat or vomiting. Past treatments include nothing.        The following portions of the patient's history were reviewed and updated as appropriate: allergies, current medications, past social history and problem list.    Review of Systems   Constitutional: Negative for chills, fatigue and fever.   HENT: Negative for congestion, rhinorrhea and sore throat.    Eyes: Negative.  Negative for pain.   Respiratory: Negative.  Negative for cough and shortness of  "breath.    Cardiovascular: Negative.    Gastrointestinal: Negative.  Negative for abdominal pain, anorexia, constipation, diarrhea, nausea and vomiting.   Endocrine: Negative.    Genitourinary: Positive for vaginal discharge. Negative for dysuria, flank pain, frequency, hematuria, menorrhagia, missed menses, pelvic pain and urgency.        Lmp 1 week ago    Musculoskeletal: Negative.  Negative for back pain and joint pain.   Skin: Positive for rash. Negative for color change, pallor and wound.        Skin lesion to left inner groin    Allergic/Immunologic: Negative.    Neurological: Negative.  Negative for headaches.   Hematological: Negative.    Psychiatric/Behavioral: Negative.        Objective   /72   Temp 97.1 °F (36.2 °C) (Tympanic)   Ht 167.6 cm (66\")   Wt 54.4 kg (120 lb)   BMI 19.37 kg/m²   Physical Exam  Vitals signs and nursing note reviewed.   Constitutional:       Appearance: Normal appearance.   HENT:      Head: Normocephalic.      Right Ear: Tympanic membrane normal.      Nose: Nose normal.      Mouth/Throat:      Mouth: Mucous membranes are moist.   Eyes:      Pupils: Pupils are equal, round, and reactive to light.   Neck:      Musculoskeletal: Normal range of motion.   Cardiovascular:      Pulses: Normal pulses.   Pulmonary:      Effort: Pulmonary effort is normal.   Abdominal:      Hernia: There is no hernia in the left inguinal area or right inguinal area.   Genitourinary:     General: Normal vulva.      Exam position: Knee-chest position.      Labia:         Right: No rash, tenderness, lesion or injury.         Left: No rash, tenderness or injury.       Vagina: Vaginal discharge present.      Cervix: Normal.      Uterus: Normal.       Adnexa: Right adnexa normal.      Rectum: Normal. Guaiac result negative.   Musculoskeletal: Normal range of motion.   Lymphadenopathy:      Lower Body: No right inguinal adenopathy. No left inguinal adenopathy.   Skin:     General: Skin is warm.      " Coloration: Skin is not jaundiced or pale.      Findings: Lesion and rash present. No bruising or erythema. Rash is not crusting, macular, nodular, papular, purpuric, pustular, scaling, urticarial or vesicular.      Comments: Discolored toes and fingers probably raynauds     Discolored skin lesion left inner groin red and irritated    Neurological:      Mental Status: She is alert.         Assessment/Plan   Problems Addressed this Visit     None      Visit Diagnoses     Gynecologic exam normal    -  Primary    Relevant Orders    Liquid-based Pap Smear, Screening    Discharge from the vagina        Relevant Orders    Gardnerella vaginalis, Trichomonas vaginalis, Candida albicans, DNA - Swab, Vagina (Completed)      Diagnoses       Codes Comments    Gynecologic exam normal    -  Primary ICD-10-CM: Z01.419  ICD-9-CM: V72.31     Discharge from the vagina     ICD-10-CM: N89.8  ICD-9-CM: 623.5         CANDIDA ALBICANS Negative    GARDNERELLA VAGINALIS Negative    TRICHOMONAS VAGINALIS Negative    Resulting Agency [facility]         Specimen Collected: 02/05/21 09:01 Last Resulted: 02/05/21 10:17        Order Details      View Encounter      Lab and Collection Details      Routing      Result History           Result Communications      Result Notes                       New Medications Ordered This Visit   Medications   • mupirocin (Bactroban) 2 % ointment     Sig: Apply  topically to the appropriate area as directed 3 (Three) Times a Day.     Dispense:  30 g     Refill:  5      meds as directed, follow up if worsen -patient agrees pap sent as directed

## 2021-02-09 LAB
LAB AP CASE REPORT: NORMAL
PATH INTERP SPEC-IMP: NORMAL

## 2021-04-27 ENCOUNTER — LAB (OUTPATIENT)
Dept: LAB | Facility: OTHER | Age: 26
End: 2021-04-27

## 2021-04-27 DIAGNOSIS — E06.3 HYPOTHYROIDISM DUE TO HASHIMOTO'S THYROIDITIS: ICD-10-CM

## 2021-04-27 DIAGNOSIS — E03.8 HYPOTHYROIDISM DUE TO HASHIMOTO'S THYROIDITIS: ICD-10-CM

## 2021-04-27 DIAGNOSIS — E55.9 VITAMIN D DEFICIENCY: ICD-10-CM

## 2021-04-27 LAB
ALBUMIN SERPL-MCNC: 3.9 G/DL (ref 3.5–5)
ALBUMIN/GLOB SERPL: 1.4 G/DL (ref 1.1–1.8)
ALP SERPL-CCNC: 82 U/L (ref 38–126)
ALT SERPL W P-5'-P-CCNC: 18 U/L
ANION GAP SERPL CALCULATED.3IONS-SCNC: 8 MMOL/L (ref 5–15)
AST SERPL-CCNC: 32 U/L (ref 14–36)
BASOPHILS # BLD AUTO: 0.03 10*3/MM3 (ref 0–0.2)
BASOPHILS NFR BLD AUTO: 0.4 % (ref 0–1.5)
BILIRUB SERPL-MCNC: 0.4 MG/DL (ref 0.2–1.3)
BUN SERPL-MCNC: 12 MG/DL (ref 7–23)
BUN/CREAT SERPL: 12.8 (ref 7–25)
CALCIUM SPEC-SCNC: 9 MG/DL (ref 8.4–10.2)
CHLORIDE SERPL-SCNC: 102 MMOL/L (ref 101–112)
CO2 SERPL-SCNC: 28 MMOL/L (ref 22–30)
CREAT SERPL-MCNC: 0.94 MG/DL (ref 0.52–1.04)
DEPRECATED RDW RBC AUTO: 42.6 FL (ref 37–54)
EOSINOPHIL # BLD AUTO: 0.21 10*3/MM3 (ref 0–0.4)
EOSINOPHIL NFR BLD AUTO: 2.5 % (ref 0.3–6.2)
ERYTHROCYTE [DISTWIDTH] IN BLOOD BY AUTOMATED COUNT: 13.9 % (ref 12.3–15.4)
GFR SERPL CREATININE-BSD FRML MDRD: 71 ML/MIN/1.73 (ref 71–165)
GLOBULIN UR ELPH-MCNC: 2.7 GM/DL (ref 2.3–3.5)
GLUCOSE SERPL-MCNC: 98 MG/DL (ref 70–99)
HCT VFR BLD AUTO: 38.5 % (ref 34–46.6)
HGB BLD-MCNC: 13.1 G/DL (ref 12–15.9)
LYMPHOCYTES # BLD AUTO: 1.36 10*3/MM3 (ref 0.7–3.1)
LYMPHOCYTES NFR BLD AUTO: 16.4 % (ref 19.6–45.3)
MCH RBC QN AUTO: 29 PG (ref 26.6–33)
MCHC RBC AUTO-ENTMCNC: 34 G/DL (ref 31.5–35.7)
MCV RBC AUTO: 85.2 FL (ref 79–97)
MONOCYTES # BLD AUTO: 0.48 10*3/MM3 (ref 0.1–0.9)
MONOCYTES NFR BLD AUTO: 5.8 % (ref 5–12)
NEUTROPHILS NFR BLD AUTO: 6.22 10*3/MM3 (ref 1.7–7)
NEUTROPHILS NFR BLD AUTO: 74.9 % (ref 42.7–76)
PLATELET # BLD AUTO: 226 10*3/MM3 (ref 140–450)
PMV BLD AUTO: 8.5 FL (ref 6–12)
POTASSIUM SERPL-SCNC: 4.1 MMOL/L (ref 3.4–5)
PROT SERPL-MCNC: 6.6 G/DL (ref 6.3–8.6)
RBC # BLD AUTO: 4.52 10*6/MM3 (ref 3.77–5.28)
SODIUM SERPL-SCNC: 138 MMOL/L (ref 137–145)
WBC # BLD AUTO: 8.3 10*3/MM3 (ref 3.4–10.8)

## 2021-04-27 PROCEDURE — 82306 VITAMIN D 25 HYDROXY: CPT | Performed by: NURSE PRACTITIONER

## 2021-04-27 PROCEDURE — 85025 COMPLETE CBC W/AUTO DIFF WBC: CPT | Performed by: NURSE PRACTITIONER

## 2021-04-27 PROCEDURE — 36415 COLL VENOUS BLD VENIPUNCTURE: CPT | Performed by: NURSE PRACTITIONER

## 2021-04-27 PROCEDURE — 84443 ASSAY THYROID STIM HORMONE: CPT | Performed by: NURSE PRACTITIONER

## 2021-04-27 PROCEDURE — 80053 COMPREHEN METABOLIC PANEL: CPT | Performed by: NURSE PRACTITIONER

## 2021-04-28 LAB
25(OH)D3 SERPL-MCNC: 31.5 NG/ML
TSH SERPL DL<=0.05 MIU/L-ACNC: 13.4 UIU/ML (ref 0.27–4.2)

## 2021-04-29 ENCOUNTER — OFFICE VISIT (OUTPATIENT)
Dept: ENDOCRINOLOGY | Facility: CLINIC | Age: 26
End: 2021-04-29

## 2021-04-29 VITALS
HEIGHT: 67 IN | WEIGHT: 121.8 LBS | SYSTOLIC BLOOD PRESSURE: 124 MMHG | OXYGEN SATURATION: 100 % | DIASTOLIC BLOOD PRESSURE: 78 MMHG | HEART RATE: 93 BPM | BODY MASS INDEX: 19.12 KG/M2

## 2021-04-29 DIAGNOSIS — R53.83 OTHER FATIGUE: ICD-10-CM

## 2021-04-29 DIAGNOSIS — E55.9 VITAMIN D DEFICIENCY: ICD-10-CM

## 2021-04-29 DIAGNOSIS — E06.3 HYPOTHYROIDISM DUE TO HASHIMOTO'S THYROIDITIS: Primary | ICD-10-CM

## 2021-04-29 DIAGNOSIS — E03.8 HYPOTHYROIDISM DUE TO HASHIMOTO'S THYROIDITIS: Primary | ICD-10-CM

## 2021-04-29 PROCEDURE — 99214 OFFICE O/P EST MOD 30 MIN: CPT | Performed by: NURSE PRACTITIONER

## 2021-04-29 RX ORDER — LEVOTHYROXINE SODIUM 112 UG/1
112 TABLET ORAL DAILY
Qty: 30 TABLET | Refills: 11 | Status: SHIPPED | OUTPATIENT
Start: 2021-04-29 | End: 2022-01-12 | Stop reason: DRUGHIGH

## 2021-04-29 NOTE — PROGRESS NOTES
"Chief Complaint  No chief complaint on file.    Subjective          Lala Padilla presents to [facility] ENDOCRINOLOGY  History of Present Illness       In office visit       26-year-old female presents for follow-up     Reason hypothyroidism         duration since 2015      Timing constant         Quality controlled            Severity - mild     Context - diagnosed with routine lab work      Location/size - thyroid      THYROID ULTRASOUND     HISTORY: Hypothyroidism. Acquired hypothyroidism.     The thyroid was scanned with a small parts transducer.     COMPARISON: None     FINDINGS:  The right lobe measures 4.23 cm in length by 1.13 cm AP by 1.23  cm transverse.  The left lobe measures 4.30 cm in length by 1.28 cm AP by 1.14 cm  transverse.  Unremarkable isthmus.     Thyroid of inhomogeneous echotexture.  No focal nodule or mass.     IMPRESSION:  CONCLUSION:  Heterogeneous thyroid which could represent thyroiditis.  No focal nodule or mass.     [id number]     Electronically signed by:  Jm Lee MD  7/26/2019 1:35 PM CDT  Workstation: [id number]        Quantity -     Lab Results   Component Value Date    TSH 13.400 (H) 04/27/2021                Symptoms -  fatigue     Alleviating Factors -compliance with  levothyroxine 100 mcg one daily            She takes the medication alone with no other medications or food      Aggravating Factors - none              Review of Systems - General ROS: positive for  - fatigue      Objective   Vital Signs:   /78   Pulse 93   Ht 170.2 cm (67\")   Wt 55.2 kg (121 lb 12.8 oz)   SpO2 100%   BMI 19.08 kg/m²     Physical Exam  Constitutional:       Appearance: Normal appearance.   HENT:      Head: Normocephalic.   Eyes:      Conjunctiva/sclera: Conjunctivae normal.      Pupils: Pupils are equal, round, and reactive to light.   Cardiovascular:      Rate and Rhythm: Regular rhythm.      Heart sounds: Normal heart sounds.   Pulmonary:      Breath " sounds: Normal breath sounds.   Musculoskeletal:         General: Normal range of motion.      Cervical back: Normal range of motion and neck supple.   Skin:     Coloration: Skin is not pale.   Neurological:      General: No focal deficit present.      Mental Status: She is alert.   Psychiatric:         Mood and Affect: Mood normal.         Thought Content: Thought content normal.         Judgment: Judgment normal.        Result Review :   The following data was reviewed by: MILLY Alcala on 04/29/2021:  Common labs    Common Labsle 1/8/21 1/8/21 1/25/21 1/25/21 4/27/21 4/27/21    1615 1615 1640 1640 1118 1118   Glucose  79  75  98   BUN  9  11  12   Creatinine  0.88  0.88  0.94   eGFR Non  Am  77  77  71   Sodium  139  138  138   Potassium  4.3  4.0  4.1   Chloride  104  103  102   Calcium  9.6  9.5  9.0   Albumin  4.90  4.20  3.90   Total Bilirubin  0.5  0.3  0.4   Alkaline Phosphatase  68  71  82   AST (SGOT)  20  17  32   ALT (SGPT)  15  15  18   WBC 8.28  8.64  8.30    Hemoglobin 13.7  13.2  13.1    Hematocrit 39.0  38.4  38.5    Platelets 265  241  226                      Assessment and Plan    Diagnoses and all orders for this visit:    1. Hypothyroidism due to Hashimoto's thyroiditis (Primary)  -     TSH; Future    Other orders  -     levothyroxine (Synthroid) 112 MCG tablet; Take 1 tablet by mouth Daily.  Dispense: 30 tablet; Refill: 11             Hypothyroidism due to hashimoto's               Taking Levothyroxine 100 mcg --- increased to 112 mcg daily      Lab Results   Component Value Date    TSH 13.400 (H) 04/27/2021          Bone Health     Vitamin d def..        Taking MVI daily with vitamin d       Component      Latest Ref Rng & Units 4/27/2021   25 Hydroxy, Vitamin D      ng/ml 31.5           fatigue    Improve TSH       Follow Up   Return in about 4 months (around 8/29/2021) for Recheck.  Patient was given instructions and counseling regarding her condition or for health  maintenance advice. Please see specific information pulled into the AVS if appropriate.

## 2021-06-14 ENCOUNTER — LAB (OUTPATIENT)
Dept: LAB | Facility: OTHER | Age: 26
End: 2021-06-14

## 2021-06-14 DIAGNOSIS — E03.8 HYPOTHYROIDISM DUE TO HASHIMOTO'S THYROIDITIS: ICD-10-CM

## 2021-06-14 DIAGNOSIS — E06.3 HYPOTHYROIDISM DUE TO HASHIMOTO'S THYROIDITIS: ICD-10-CM

## 2021-06-14 PROCEDURE — 84443 ASSAY THYROID STIM HORMONE: CPT | Performed by: NURSE PRACTITIONER

## 2021-06-14 PROCEDURE — 36415 COLL VENOUS BLD VENIPUNCTURE: CPT | Performed by: NURSE PRACTITIONER

## 2021-06-15 LAB — TSH SERPL DL<=0.05 MIU/L-ACNC: 0.64 UIU/ML (ref 0.27–4.2)

## 2021-07-09 ENCOUNTER — LAB (OUTPATIENT)
Dept: LAB | Facility: HOSPITAL | Age: 26
End: 2021-07-09

## 2021-07-09 ENCOUNTER — OFFICE VISIT (OUTPATIENT)
Dept: FAMILY MEDICINE CLINIC | Facility: CLINIC | Age: 26
End: 2021-07-09

## 2021-07-09 VITALS
TEMPERATURE: 97.7 F | HEIGHT: 67 IN | OXYGEN SATURATION: 99 % | SYSTOLIC BLOOD PRESSURE: 102 MMHG | DIASTOLIC BLOOD PRESSURE: 72 MMHG | WEIGHT: 123 LBS | BODY MASS INDEX: 19.3 KG/M2 | HEART RATE: 63 BPM

## 2021-07-09 DIAGNOSIS — R23.3 PETECHIAL RASH: ICD-10-CM

## 2021-07-09 DIAGNOSIS — I73.00 RAYNAUD'S DISEASE WITHOUT GANGRENE: ICD-10-CM

## 2021-07-09 DIAGNOSIS — E06.3 HYPOTHYROIDISM DUE TO HASHIMOTO'S THYROIDITIS: Primary | ICD-10-CM

## 2021-07-09 DIAGNOSIS — E03.8 HYPOTHYROIDISM DUE TO HASHIMOTO'S THYROIDITIS: Primary | ICD-10-CM

## 2021-07-09 DIAGNOSIS — F41.9 ANXIETY: ICD-10-CM

## 2021-07-09 DIAGNOSIS — R53.81 MALAISE: ICD-10-CM

## 2021-07-09 PROCEDURE — 86431 RHEUMATOID FACTOR QUANT: CPT | Performed by: NURSE PRACTITIONER

## 2021-07-09 PROCEDURE — 99214 OFFICE O/P EST MOD 30 MIN: CPT | Performed by: NURSE PRACTITIONER

## 2021-07-09 PROCEDURE — 86757 RICKETTSIA ANTIBODY: CPT | Performed by: NURSE PRACTITIONER

## 2021-07-09 PROCEDURE — 85652 RBC SED RATE AUTOMATED: CPT | Performed by: NURSE PRACTITIONER

## 2021-07-09 PROCEDURE — 84480 ASSAY TRIIODOTHYRONINE (T3): CPT | Performed by: NURSE PRACTITIONER

## 2021-07-09 PROCEDURE — 84443 ASSAY THYROID STIM HORMONE: CPT | Performed by: NURSE PRACTITIONER

## 2021-07-09 PROCEDURE — 36415 COLL VENOUS BLD VENIPUNCTURE: CPT | Performed by: NURSE PRACTITIONER

## 2021-07-09 PROCEDURE — 85025 COMPLETE CBC W/AUTO DIFF WBC: CPT | Performed by: NURSE PRACTITIONER

## 2021-07-09 PROCEDURE — 80053 COMPREHEN METABOLIC PANEL: CPT | Performed by: NURSE PRACTITIONER

## 2021-07-09 PROCEDURE — 84439 ASSAY OF FREE THYROXINE: CPT | Performed by: NURSE PRACTITIONER

## 2021-07-09 NOTE — PROGRESS NOTES
Chief Complaint   Patient presents with   • Hypothyroidism     check up    • discoloration in arms and legs     feels like it's getting worse     Subjective   Lala Padilla is a 26 y.o. female.     Hypothyroidism  This is a chronic problem. The current episode started more than 1 year ago. The problem has been waxing and waning. Associated symptoms include a rash. Pertinent negatives include no arthralgias, chest pain, diaphoresis, fatigue or fever. The symptoms are aggravated by stress. She has tried rest (synthroid ) for the symptoms. The treatment provided mild relief.   Fatigue  This is a recurrent problem. The current episode started 1 to 4 weeks ago. The problem occurs every several days. The problem has been resolved. Associated symptoms include a rash. Pertinent negatives include no arthralgias, chest pain, diaphoresis, fatigue or fever. She has tried acetaminophen for the symptoms. The treatment provided mild relief.   Thyroid Problem  Presents for follow-up visit. Symptoms include depressed mood and dry skin. Patient reports no anxiety, cold intolerance, constipation, diaphoresis, diarrhea, fatigue, hair loss, heat intolerance, hoarse voice, leg swelling, menstrual problem, nail problem, palpitations, tremors, weight gain or weight loss. The symptoms have been stable.   Anxiety  Presents for follow-up visit. Symptoms include decreased concentration, depressed mood, excessive worry and panic. Patient reports no chest pain, compulsions, confusion, dizziness, dry mouth, feeling of choking, hyperventilation, impotence, insomnia, irritability, malaise, muscle tension, nervous/anxious behavior, obsessions, palpitations, restlessness, shortness of breath or suicidal ideas. Symptoms occur most days. The quality of sleep is good. Nighttime awakenings: none.     There is no history of asthma. Compliance with medications is %.   Rash  This is a recurrent problem. The current episode started more  than 1 month ago. The problem has been gradually worsening since onset. The rash is diffuse. Pertinent negatives include no diarrhea, eye pain, fatigue, fever, rhinorrhea or shortness of breath. Past treatments include nothing. There is no history of allergies, asthma, eczema or varicella.        The following portions of the patient's history were reviewed and updated as appropriate: allergies, current medications, past social history and problem list.    Review of Systems   Constitutional: Positive for activity change. Negative for appetite change, diaphoresis, fatigue, fever, irritability, unexpected weight change, weight gain and weight loss.   HENT: Negative for dental problem, drooling, ear discharge, ear pain, facial swelling, hearing loss, hoarse voice, mouth sores, nosebleeds, postnasal drip, rhinorrhea, sinus pressure, sinus pain and sneezing.         Reports trouble swallowing on and off    Eyes: Negative.  Negative for photophobia, pain, discharge, redness, itching and visual disturbance.   Respiratory: Negative.  Negative for apnea, choking, chest tightness, shortness of breath, wheezing and stridor.    Cardiovascular: Negative.  Negative for chest pain, palpitations and leg swelling.   Gastrointestinal: Negative.  Negative for abdominal distention, anal bleeding, blood in stool, constipation, diarrhea and rectal pain.   Endocrine: Negative for cold intolerance, heat intolerance, polydipsia, polyphagia and polyuria.   Genitourinary: Negative.  Negative for difficulty urinating, dyspareunia, dysuria, genital sores, hematuria, impotence and menstrual problem.   Musculoskeletal: Positive for back pain and gait problem. Negative for arthralgias and neck stiffness.        Reports feet turning blue on and off    Skin: Positive for color change, pallor and rash. Negative for wound.        Reports color changes extremities    Allergic/Immunologic: Negative.  Negative for environmental allergies, food allergies  "and immunocompromised state.   Neurological: Negative for dizziness, tremors, seizures, syncope, speech difficulty and light-headedness.   Hematological: Negative.  Negative for adenopathy. Does not bruise/bleed easily.   Psychiatric/Behavioral: Positive for decreased concentration and sleep disturbance. Negative for agitation, behavioral problems, confusion, dysphoric mood, hallucinations, self-injury and suicidal ideas. The patient is not nervous/anxious, does not have insomnia and is not hyperactive.         Currently treating for add        Objective   /72   Pulse 63   Temp 97.7 °F (36.5 °C) (Infrared)   Ht 170.2 cm (67\")   Wt 55.8 kg (123 lb)   SpO2 99%   BMI 19.26 kg/m²   Physical Exam  Vitals and nursing note reviewed.   Constitutional:       Appearance: Normal appearance.   HENT:      Head: Normocephalic.      Right Ear: Tympanic membrane normal.      Nose: Nose normal.      Mouth/Throat:      Mouth: Mucous membranes are moist.   Eyes:      Pupils: Pupils are equal, round, and reactive to light.   Cardiovascular:      Pulses: Normal pulses.   Pulmonary:      Effort: Pulmonary effort is normal.   Abdominal:      General: Abdomen is flat.   Musculoskeletal:         General: Normal range of motion.      Cervical back: Normal range of motion.   Skin:     General: Skin is warm.      Coloration: Skin is not jaundiced or pale.      Findings: Rash present. No bruising, erythema or lesion.      Comments: Discolored toes and fingers probably raynauds     Petechial rash to abdomen and legs   Neurological:      General: No focal deficit present.      Mental Status: She is alert and oriented to person, place, and time.   Psychiatric:         Mood and Affect: Mood normal.         Behavior: Behavior normal.         Assessment/Plan   Problems Addressed this Visit        Endocrine and Metabolic    Hypothyroidism - Primary    Relevant Orders    TSH    T4, Free    T3    CBC & Differential    Comprehensive Metabolic " Panel       Mental Health    Anxiety      Other Visit Diagnoses     Malaise        Raynaud's disease without gangrene        Relevant Orders    Ambulatory Referral to Vascular Surgery    Petechial rash        Relevant Orders    Rheumatoid Factor    [facility] ([facility]G / M)    Sedimentation Rate      Diagnoses       Codes Comments    Hypothyroidism due to Hashimoto's thyroiditis    -  Primary ICD-10-CM: E03.8, E06.3  ICD-9-CM: 244.8, 245.2     Malaise     ICD-10-CM: R53.81  ICD-9-CM: 780.79     Raynaud's disease without gangrene     ICD-10-CM: I73.00  ICD-9-CM: 443.0     Petechial rash     ICD-10-CM: R23.3  ICD-9-CM: 782.7     Anxiety     ICD-10-CM: F41.9  ICD-9-CM: 300.00          No orders of the defined types were placed in this encounter.     I reviewed my diagnosis of raynaud's and discussed procardia -patient wants to see vascular to make sure this is the problem-labs today as directed, lupus has been ruled out.

## 2021-07-10 LAB
ALBUMIN SERPL-MCNC: 4.5 G/DL (ref 3.5–5.2)
ALBUMIN/GLOB SERPL: 2 G/DL
ALP SERPL-CCNC: 71 U/L (ref 39–117)
ALT SERPL W P-5'-P-CCNC: 12 U/L (ref 1–33)
ANION GAP SERPL CALCULATED.3IONS-SCNC: 8.6 MMOL/L (ref 5–15)
AST SERPL-CCNC: 18 U/L (ref 1–32)
BASOPHILS # BLD AUTO: 0.04 10*3/MM3 (ref 0–0.2)
BASOPHILS NFR BLD AUTO: 0.5 % (ref 0–1.5)
BILIRUB SERPL-MCNC: 0.3 MG/DL (ref 0–1.2)
BUN SERPL-MCNC: 10 MG/DL (ref 6–20)
BUN/CREAT SERPL: 11.5 (ref 7–25)
CALCIUM SPEC-SCNC: 9.4 MG/DL (ref 8.6–10.5)
CHLORIDE SERPL-SCNC: 103 MMOL/L (ref 98–107)
CHROMATIN AB SERPL-ACNC: <10 IU/ML (ref 0–14)
CO2 SERPL-SCNC: 26.4 MMOL/L (ref 22–29)
CREAT SERPL-MCNC: 0.87 MG/DL (ref 0.57–1)
DEPRECATED RDW RBC AUTO: 41 FL (ref 37–54)
EOSINOPHIL # BLD AUTO: 0.28 10*3/MM3 (ref 0–0.4)
EOSINOPHIL NFR BLD AUTO: 3.2 % (ref 0.3–6.2)
ERYTHROCYTE [DISTWIDTH] IN BLOOD BY AUTOMATED COUNT: 13.1 % (ref 12.3–15.4)
ERYTHROCYTE [SEDIMENTATION RATE] IN BLOOD: 2 MM/HR (ref 0–20)
GFR SERPL CREATININE-BSD FRML MDRD: 78 ML/MIN/1.73
GLOBULIN UR ELPH-MCNC: 2.3 GM/DL
GLUCOSE SERPL-MCNC: 84 MG/DL (ref 65–99)
HCT VFR BLD AUTO: 39 % (ref 34–46.6)
HGB BLD-MCNC: 13.3 G/DL (ref 12–15.9)
IMM GRANULOCYTES # BLD AUTO: 0.03 10*3/MM3 (ref 0–0.05)
IMM GRANULOCYTES NFR BLD AUTO: 0.3 % (ref 0–0.5)
LYMPHOCYTES # BLD AUTO: 1.59 10*3/MM3 (ref 0.7–3.1)
LYMPHOCYTES NFR BLD AUTO: 18.3 % (ref 19.6–45.3)
MCH RBC QN AUTO: 28.9 PG (ref 26.6–33)
MCHC RBC AUTO-ENTMCNC: 34.1 G/DL (ref 31.5–35.7)
MCV RBC AUTO: 84.8 FL (ref 79–97)
MONOCYTES # BLD AUTO: 0.49 10*3/MM3 (ref 0.1–0.9)
MONOCYTES NFR BLD AUTO: 5.6 % (ref 5–12)
NEUTROPHILS NFR BLD AUTO: 6.27 10*3/MM3 (ref 1.7–7)
NEUTROPHILS NFR BLD AUTO: 72.1 % (ref 42.7–76)
NRBC BLD AUTO-RTO: 0 /100 WBC (ref 0–0.2)
PLATELET # BLD AUTO: 248 10*3/MM3 (ref 140–450)
PMV BLD AUTO: 9.3 FL (ref 6–12)
POTASSIUM SERPL-SCNC: 4.2 MMOL/L (ref 3.5–5.2)
PROT SERPL-MCNC: 6.8 G/DL (ref 6–8.5)
RBC # BLD AUTO: 4.6 10*6/MM3 (ref 3.77–5.28)
SODIUM SERPL-SCNC: 138 MMOL/L (ref 136–145)
T3 SERPL-MCNC: 99.4 NG/DL (ref 80–200)
T4 FREE SERPL-MCNC: 1.49 NG/DL (ref 0.93–1.7)
TSH SERPL DL<=0.05 MIU/L-ACNC: 2.48 UIU/ML (ref 0.27–4.2)
WBC # BLD AUTO: 8.7 10*3/MM3 (ref 3.4–10.8)

## 2021-07-12 ENCOUNTER — TELEPHONE (OUTPATIENT)
Dept: FAMILY MEDICINE CLINIC | Facility: CLINIC | Age: 26
End: 2021-07-12

## 2021-07-12 NOTE — TELEPHONE ENCOUNTER
Per MILLY Guevara, Ms. Padilla has been called with recent lab results & recommendations.  Continue current medications and follow-up as planned or sooner if any problems.         ----- Message from MILLY Keen sent at 7/12/2021  7:09 AM CDT -----  Regarding: FW:  Can you let her know labs are good. No changes needed.  ----- Message -----  From: Lab, Background User  Sent: 7/10/2021  12:54 AM CDT  To: MILLY Keen

## 2021-07-13 DIAGNOSIS — I73.00 RAYNAUD'S PHENOMENON WITHOUT GANGRENE: Primary | ICD-10-CM

## 2021-07-13 LAB
R RICKETTSI IGG SER QL IA: NORMAL
R RICKETTSI IGG TITR SER IF: NORMAL {TITER}
R RICKETTSI IGM SER-ACNC: 0.28 INDEX (ref 0–0.89)

## 2021-07-15 ENCOUNTER — TELEPHONE (OUTPATIENT)
Dept: FAMILY MEDICINE CLINIC | Facility: CLINIC | Age: 26
End: 2021-07-15

## 2021-07-15 NOTE — TELEPHONE ENCOUNTER
Per MILLY Guevara, Ms. Padilla has been called with recent lab results & recommendations.  Continue current medications and follow-up as planned or sooner if any problems.         ----- Message from MILLY Keen sent at 7/13/2021 10:17 PM CDT -----  Regarding: FW:  Can you let her know neg for RMSF  ----- Message -----  From: Lab, Background User  Sent: 7/10/2021  12:54 AM CDT  To: MILLY Keen

## 2021-08-11 ENCOUNTER — OFFICE VISIT (OUTPATIENT)
Dept: CARDIAC SURGERY | Facility: CLINIC | Age: 26
End: 2021-08-11

## 2021-08-11 VITALS
HEART RATE: 80 BPM | WEIGHT: 123 LBS | OXYGEN SATURATION: 100 % | BODY MASS INDEX: 19.3 KG/M2 | DIASTOLIC BLOOD PRESSURE: 70 MMHG | HEIGHT: 67 IN | SYSTOLIC BLOOD PRESSURE: 120 MMHG

## 2021-08-11 DIAGNOSIS — R23.0 EXTREMITY CYANOSIS: Primary | ICD-10-CM

## 2021-08-11 PROCEDURE — 99214 OFFICE O/P EST MOD 30 MIN: CPT | Performed by: NURSE PRACTITIONER

## 2021-08-11 NOTE — PATIENT INSTRUCTIONS
Extremity Cyanosis  -Normal Raynaud's study  -Normal Arterial flow lower extremity  -Obtain Echo with Bubble Study  Office will call results

## 2021-08-12 ENCOUNTER — PATIENT ROUNDING (BHMG ONLY) (OUTPATIENT)
Dept: CARDIAC SURGERY | Facility: CLINIC | Age: 26
End: 2021-08-12

## 2021-08-12 NOTE — PROGRESS NOTES
August 12, 2021    Hello, may I speak with Lala Esparza Brothers?    My name is Marty Gates    I am  with [facility]  [address] [place] [address]   [place] [address] [phone].    Before we get started may I verify your date of birth? 1995    I am calling to officially welcome you to our practice and ask about your recent visit. Is this a good time to talk? Left     Tell me about your visit with us. What things went well?  had to leave vm       We're always looking for ways to make our patients' experiences even better. Do you have recommendations on ways we may improve?  left     Overall were you satisfied with your first visit to our practice? Left        I appreciate you taking the time to speak with me today. Is there anything else I can do for you? Left       Thank you, and have a great day.

## 2021-08-13 ENCOUNTER — PATIENT ROUNDING (BHMG ONLY) (OUTPATIENT)
Dept: CARDIAC SURGERY | Facility: CLINIC | Age: 26
End: 2021-08-13

## 2021-08-13 NOTE — PROGRESS NOTES
August 13, 2021    Hello, may I speak with Lala Esparza Brothivan?    My name is Marty Gates, clinical coordinator    I am  with [facility] [place]ARDIOTHORACIC SURGERY  [address] [place] [address] [place] [address] [phone].    Before we get started may I verify your date of birth? 1995    I am calling to officially welcome you to our practice and ask about your recent visit. Is this a good time to talk? yes    Tell me about your visit with us. What things went well?  everything was nice and helpful       We're always looking for ways to make our patients' experiences even better. Do you have recommendations on ways we may improve?  no    Overall were you satisfied with your first visit to our practice? yes       I appreciate you taking the time to speak with me today. Is there anything else I can do for you? no      Thank you, and have a great day.

## 2021-08-19 NOTE — PROGRESS NOTES
CVTS Office Progress Note       Subjective   Patient ID: Lala Esparza Brothivan is a 26 y.o. female is being seen for consultation today at the request of GABRIEL Keen*    Chief Complaint:    Chief Complaint   Patient presents with   • raynauds disease       The following portions of the patient's history were reviewed and updated as appropriate: allergies, current medications, past family history, past medical history, past social history, past surgical history and problem list.  Recent images independently reviewed.  Available laboratory values reviewed.    PCP:  Paola Snyder APRN    26 y.o. female with hypothyroid (controlled), ADHD (controlled).  never smoked.  Extremity discoloration with elevation (new) turns white, painful. dizziness.  No TIA stroke amaurosis.  No MI claudication. No other associated signs, symptoms or modifying factors.    8/11/21: RAFIQ: RIGHT 1.1 Triphasic LEFT 1.1 Triphasic  8/11/21: RBI Cold Emersion: 1.1 Triphasic LEFT 1.0 Triphasic. Pulsatile digits with cold emersion.    Past Medical History:   Diagnosis Date   • Acute tonsillitis    • Bronchitis    • Cellulitis     History of abscess, near left axilla      • Contusion of knee    • Depressive disorder    • Diarrhea    • Disease of thyroid gland    • Dysmenorrhea 9/28/2017   • Dysuria    • Encounter for gynecological examination (general) (routine) without abnormal findings    • Encounter for removal of sutures    • Encounter for surveillance of contraceptive pills    • Folliculitis    • Fracture of phalanx of hand     Left thumb      • Gastroenteritis    • Generalized anxiety disorder    • Hypothyroidism, unspecified    • Irregular periods    • Kidney stone    • Knee pain    • Lipoma    • Ovarian cyst    • Pain in joint, ankle and foot    • Pain in wrist    • Panic disorder    • Ruptured cyst of ovary    • Sports physical    • Sprain of ankle    • Strain of neck muscle    • Tick bite    • Upper respiratory  "infection      Past Surgical History:   Procedure Laterality Date   • ADENOIDECTOMY     • INCISION AND DRAINAGE ABSCESS  02/04/2009    Left axillary abscess   • INJECTION OF MEDICATION  05/31/2014    Toradol(1)   • INJECTION OF MEDICATION  05/31/2014    Zofran(1)   • KIDNEY STONE SURGERY     • TONSILLECTOMY       Family History   Problem Relation Age of Onset   • Cancer Other    • Diabetes Other    • Heart disease Other    • Hypertension Other    • Thyroid disease Other      Social History     Tobacco Use   • Smoking status: Never Smoker   • Smokeless tobacco: Never Used   Substance Use Topics   • Alcohol use: No   • Drug use: No       ALLERGIES:   Patient has no known allergies.    MEDICATIONS:      Current Outpatient Medications:   •  amphetamine-dextroamphetamine (ADDERALL) 20 MG tablet, Take 1 tablet by mouth 2 (Two) Times a Day., Disp: , Rfl:   •  Cholecalciferol (VITAMIN D) 25 MCG (1000 UT) tablet, Take 1 tablet by mouth Daily., Disp: 90 tablet, Rfl: 3  •  levothyroxine (Synthroid) 112 MCG tablet, Take 1 tablet by mouth Daily., Disp: 30 tablet, Rfl: 11    Review of Systems   Eyes: Negative for visual disturbance.   Cardiovascular: Negative for claudication and cyanosis.   Respiratory: Positive for shortness of breath.    Skin: Positive for color change and nail changes.   Musculoskeletal: Negative for muscle weakness.   Gastrointestinal: Negative for dysphagia.   Neurological: Positive for dizziness, numbness and paresthesias. Negative for focal weakness, light-headedness, loss of balance and weakness.   Psychiatric/Behavioral: Negative for altered mental status.   All other systems reviewed and are negative.       Objective   Vitals:    08/11/21 1446   BP: 120/70   BP Location: Left arm   Patient Position: Sitting   Cuff Size: Adult   Pulse: 80   SpO2: 100%   Weight: 55.8 kg (123 lb)   Height: 170.2 cm (67\")     Body mass index is 19.26 kg/m².  Physical Exam  Vitals and nursing note reviewed. "   Constitutional:       Appearance: Normal appearance.   HENT:      Head: Normocephalic.      Mouth/Throat:      Mouth: Mucous membranes are moist.   Eyes:      Extraocular Movements: Extraocular movements intact.   Neck:      Vascular: No carotid bruit.   Cardiovascular:      Rate and Rhythm: Normal rate and regular rhythm.      Pulses: Normal pulses.           Radial pulses are 2+ on the right side and 2+ on the left side.        Dorsalis pedis pulses are 2+ on the right side and 2+ on the left side.        Posterior tibial pulses are 2+ on the right side and 2+ on the left side.      Heart sounds: Normal heart sounds.   Pulmonary:      Effort: Pulmonary effort is normal.      Breath sounds: Normal breath sounds.   Abdominal:      General: Bowel sounds are normal.      Palpations: Abdomen is soft.   Musculoskeletal:         General: Normal range of motion.      Cervical back: Neck supple.   Skin:     General: Skin is warm and dry.      Capillary Refill: Capillary refill takes less than 2 seconds.   Neurological:      General: No focal deficit present.      Mental Status: She is alert and oriented to person, place, and time.      Gait: Gait normal.   Psychiatric:         Mood and Affect: Mood normal.         Behavior: Behavior normal.           Assessment & Plan     Independent Review of Radiographic Studies:  Detailed discussion regarding risks, benefits, and treatment plan. Images independently reviewed. Patient understands, agrees, and wishes to proceed with plan.     1. Extremity cyanosis  Extremity Cyanosis  -Normal Raynaud's study  -Normal Arterial flow lower extremity  -Obtain Echo with Bubble Study  Office will call results  - Adult Transthoracic Echo Complete W/ Cont if Necessary Per Protocol; Future    Time spent 45 minutes in face to face evaluation, reviewing of medical history, tests, and procedures. Independent interpretation of vascular studies, ordering additional tests, documentation, and  coordination of care.   Counseling and education with the patient and family regarding treatment options, plan of care, and hoped for outcomes. All questions answered.     Patient's Body mass index is 19.26 kg/m². indicating that she is within normal range (BMI 18.5-24.9). No BMI management plan needed..       Advance Care Planning discussed and  Informational packet given to patient. Advance Care Plan on file: No            This document has been electronically signed by MILLY Miranda on August 19, 2021 13:38 CDT

## 2021-08-26 ENCOUNTER — OFFICE VISIT (OUTPATIENT)
Dept: FAMILY MEDICINE CLINIC | Facility: CLINIC | Age: 26
End: 2021-08-26

## 2021-08-26 VITALS
SYSTOLIC BLOOD PRESSURE: 110 MMHG | WEIGHT: 123 LBS | BODY MASS INDEX: 19.3 KG/M2 | HEIGHT: 67 IN | DIASTOLIC BLOOD PRESSURE: 80 MMHG

## 2021-08-26 DIAGNOSIS — L73.9 FOLLICULITIS: Primary | ICD-10-CM

## 2021-08-26 PROCEDURE — 99213 OFFICE O/P EST LOW 20 MIN: CPT | Performed by: NURSE PRACTITIONER

## 2021-08-26 NOTE — PROGRESS NOTES
"  Chief Complaint   Patient presents with   • ingrown hair     bikini line     Subjective   Lala Esparza Brothivan is a 26 y.o. female.           Presents with lesion to groin area red and irritated     Rash  This is a recurrent problem. The current episode started in the past 7 days. The problem has been waxing and waning since onset. The affected locations include the groin. The rash is characterized by pain and redness. Pertinent negatives include no anorexia, congestion, cough, diarrhea, eye pain, facial edema, fatigue, fever, joint pain, nail changes, rhinorrhea, shortness of breath, sore throat or vomiting. The treatment provided mild relief. There is no history of allergies, asthma, eczema or varicella.        The following portions of the patient's history were reviewed and updated as appropriate: allergies, current medications, past social history and problem list.    Review of Systems   Constitutional: Negative.  Negative for appetite change, chills, diaphoresis, fatigue and fever.   HENT: Negative.  Negative for congestion, rhinorrhea and sore throat.    Eyes: Negative.  Negative for pain.   Respiratory: Negative.  Negative for cough and shortness of breath.    Cardiovascular: Negative.    Gastrointestinal: Negative.  Negative for anorexia, diarrhea and vomiting.   Endocrine: Negative.    Genitourinary: Negative.    Musculoskeletal: Negative.  Negative for joint pain.   Skin: Positive for rash. Negative for color change, nail changes and pallor.   Allergic/Immunologic: Negative.  Negative for environmental allergies, food allergies and immunocompromised state.   Neurological: Negative.    Hematological: Negative.    Psychiatric/Behavioral: Negative.        Objective   /80 (BP Location: Left arm)   Ht 170.2 cm (67\")   Wt 55.8 kg (123 lb)   BMI 19.26 kg/m²   Physical Exam  Vitals and nursing note reviewed.   HENT:      Head: Normocephalic.      Right Ear: Tympanic membrane normal.      Nose: " Nose normal.      Mouth/Throat:      Mouth: Mucous membranes are moist.   Eyes:      Pupils: Pupils are equal, round, and reactive to light.   Cardiovascular:      Rate and Rhythm: Normal rate.   Pulmonary:      Effort: Pulmonary effort is normal.   Abdominal:      General: Abdomen is flat.   Musculoskeletal:         General: Normal range of motion.      Cervical back: Normal range of motion.   Skin:     Findings: Lesion and rash present.   Neurological:      General: No focal deficit present.      Mental Status: She is alert and oriented to person, place, and time.   Psychiatric:         Mood and Affect: Mood normal.         Behavior: Behavior normal.              Assessment/Plan     Problems Addressed this Visit     None      Visit Diagnoses     Folliculitis    -  Primary    Relevant Medications    mupirocin (Bactroban) 2 % ointment      Diagnoses       Codes Comments    Folliculitis    -  Primary ICD-10-CM: L73.9  ICD-9-CM: 704.8            New Medications Ordered This Visit   Medications   • mupirocin (Bactroban) 2 % ointment     Sig: Apply  topically to the appropriate area as directed 3 (Three) Times a Day.     Dispense:  30 g     Refill:  1     Current Outpatient Medications on File Prior to Visit   Medication Sig Dispense Refill   • amphetamine-dextroamphetamine (ADDERALL) 20 MG tablet Take 1 tablet by mouth 2 (Two) Times a Day.     • Cholecalciferol (VITAMIN D) 25 MCG (1000 UT) tablet Take 1 tablet by mouth Daily. 90 tablet 3   • levothyroxine (Synthroid) 112 MCG tablet Take 1 tablet by mouth Daily. 30 tablet 11     No current facility-administered medications on file prior to visit.        Follow Up   No follow-ups on file.     Warm compresses to area, return if worsen, patient agrees with plan of action

## 2021-10-12 ENCOUNTER — TRANSCRIBE ORDERS (OUTPATIENT)
Dept: LAB | Facility: HOSPITAL | Age: 26
End: 2021-10-12

## 2021-10-12 ENCOUNTER — LAB (OUTPATIENT)
Dept: LAB | Facility: HOSPITAL | Age: 26
End: 2021-10-12

## 2021-10-12 DIAGNOSIS — N28.9 DISORDER OF KIDNEY: ICD-10-CM

## 2021-10-12 DIAGNOSIS — N28.9 DISORDER OF KIDNEY: Primary | ICD-10-CM

## 2021-10-12 PROCEDURE — 80069 RENAL FUNCTION PANEL: CPT

## 2021-10-12 PROCEDURE — 81001 URINALYSIS AUTO W/SCOPE: CPT

## 2021-10-12 PROCEDURE — 36415 COLL VENOUS BLD VENIPUNCTURE: CPT

## 2021-10-13 LAB
ALBUMIN SERPL-MCNC: 4.6 G/DL (ref 3.5–5.2)
ANION GAP SERPL CALCULATED.3IONS-SCNC: 10.3 MMOL/L (ref 5–15)
BACTERIA UR QL AUTO: ABNORMAL /HPF
BILIRUB UR QL STRIP: NEGATIVE
BUN SERPL-MCNC: 10 MG/DL (ref 6–20)
BUN/CREAT SERPL: 11.6 (ref 7–25)
CALCIUM SPEC-SCNC: 9.5 MG/DL (ref 8.6–10.5)
CHLORIDE SERPL-SCNC: 102 MMOL/L (ref 98–107)
CLARITY UR: CLEAR
CO2 SERPL-SCNC: 26.7 MMOL/L (ref 22–29)
COLOR UR: YELLOW
CREAT SERPL-MCNC: 0.86 MG/DL (ref 0.57–1)
GFR SERPL CREATININE-BSD FRML MDRD: 79 ML/MIN/1.73
GLUCOSE SERPL-MCNC: 71 MG/DL (ref 65–99)
GLUCOSE UR STRIP-MCNC: NEGATIVE MG/DL
HGB UR QL STRIP.AUTO: ABNORMAL
HYALINE CASTS UR QL AUTO: ABNORMAL /LPF
KETONES UR QL STRIP: NEGATIVE
LEUKOCYTE ESTERASE UR QL STRIP.AUTO: ABNORMAL
NITRITE UR QL STRIP: NEGATIVE
PH UR STRIP.AUTO: 5.5 [PH] (ref 5–8)
PHOSPHATE SERPL-MCNC: 3.1 MG/DL (ref 2.5–4.5)
POTASSIUM SERPL-SCNC: 4 MMOL/L (ref 3.5–5.2)
PROT UR QL STRIP: NEGATIVE
RBC # UR: ABNORMAL /HPF
REF LAB TEST METHOD: ABNORMAL
SODIUM SERPL-SCNC: 139 MMOL/L (ref 136–145)
SP GR UR STRIP: 1.02 (ref 1–1.03)
SQUAMOUS #/AREA URNS HPF: ABNORMAL /HPF
UROBILINOGEN UR QL STRIP: ABNORMAL
WBC UR QL AUTO: ABNORMAL /HPF

## 2021-12-02 ENCOUNTER — LAB (OUTPATIENT)
Dept: LAB | Facility: HOSPITAL | Age: 26
End: 2021-12-02

## 2021-12-02 ENCOUNTER — OFFICE VISIT (OUTPATIENT)
Dept: ENDOCRINOLOGY | Facility: CLINIC | Age: 26
End: 2021-12-02

## 2021-12-02 VITALS
OXYGEN SATURATION: 100 % | DIASTOLIC BLOOD PRESSURE: 68 MMHG | HEIGHT: 66 IN | HEART RATE: 108 BPM | BODY MASS INDEX: 19.72 KG/M2 | SYSTOLIC BLOOD PRESSURE: 108 MMHG | WEIGHT: 122.7 LBS

## 2021-12-02 DIAGNOSIS — E03.8 HYPOTHYROIDISM DUE TO HASHIMOTO'S THYROIDITIS: ICD-10-CM

## 2021-12-02 DIAGNOSIS — R53.83 OTHER FATIGUE: ICD-10-CM

## 2021-12-02 DIAGNOSIS — E55.9 VITAMIN D DEFICIENCY: Primary | ICD-10-CM

## 2021-12-02 DIAGNOSIS — E06.3 HYPOTHYROIDISM DUE TO HASHIMOTO'S THYROIDITIS: ICD-10-CM

## 2021-12-02 LAB
ALBUMIN SERPL-MCNC: 4.6 G/DL (ref 3.5–5.2)
ALBUMIN/GLOB SERPL: 1.6 G/DL
ALP SERPL-CCNC: 80 U/L (ref 39–117)
ALT SERPL W P-5'-P-CCNC: 15 U/L (ref 1–33)
ANION GAP SERPL CALCULATED.3IONS-SCNC: 7 MMOL/L (ref 5–15)
AST SERPL-CCNC: 21 U/L (ref 1–32)
BASOPHILS # BLD AUTO: 0.05 10*3/MM3 (ref 0–0.2)
BASOPHILS NFR BLD AUTO: 0.7 % (ref 0–1.5)
BILIRUB SERPL-MCNC: 0.4 MG/DL (ref 0–1.2)
BUN SERPL-MCNC: 13 MG/DL (ref 6–20)
BUN/CREAT SERPL: 12.7 (ref 7–25)
CALCIUM SPEC-SCNC: 9.4 MG/DL (ref 8.6–10.5)
CHLORIDE SERPL-SCNC: 104 MMOL/L (ref 98–107)
CO2 SERPL-SCNC: 29 MMOL/L (ref 22–29)
CREAT SERPL-MCNC: 1.02 MG/DL (ref 0.57–1)
DEPRECATED RDW RBC AUTO: 42 FL (ref 37–54)
EOSINOPHIL # BLD AUTO: 0.25 10*3/MM3 (ref 0–0.4)
EOSINOPHIL NFR BLD AUTO: 3.4 % (ref 0.3–6.2)
ERYTHROCYTE [DISTWIDTH] IN BLOOD BY AUTOMATED COUNT: 13.6 % (ref 12.3–15.4)
GFR SERPL CREATININE-BSD FRML MDRD: 65 ML/MIN/1.73
GLOBULIN UR ELPH-MCNC: 2.8 GM/DL
GLUCOSE SERPL-MCNC: 88 MG/DL (ref 65–99)
HCT VFR BLD AUTO: 41.3 % (ref 34–46.6)
HGB BLD-MCNC: 13.9 G/DL (ref 12–15.9)
IMM GRANULOCYTES # BLD AUTO: 0.02 10*3/MM3 (ref 0–0.05)
IMM GRANULOCYTES NFR BLD AUTO: 0.3 % (ref 0–0.5)
LYMPHOCYTES # BLD AUTO: 1.21 10*3/MM3 (ref 0.7–3.1)
LYMPHOCYTES NFR BLD AUTO: 16.6 % (ref 19.6–45.3)
MCH RBC QN AUTO: 28.6 PG (ref 26.6–33)
MCHC RBC AUTO-ENTMCNC: 33.7 G/DL (ref 31.5–35.7)
MCV RBC AUTO: 85 FL (ref 79–97)
MONOCYTES # BLD AUTO: 0.54 10*3/MM3 (ref 0.1–0.9)
MONOCYTES NFR BLD AUTO: 7.4 % (ref 5–12)
NEUTROPHILS NFR BLD AUTO: 5.2 10*3/MM3 (ref 1.7–7)
NEUTROPHILS NFR BLD AUTO: 71.6 % (ref 42.7–76)
NRBC BLD AUTO-RTO: 0 /100 WBC (ref 0–0.2)
PLATELET # BLD AUTO: 250 10*3/MM3 (ref 140–450)
PMV BLD AUTO: 8.8 FL (ref 6–12)
POTASSIUM SERPL-SCNC: 4.4 MMOL/L (ref 3.5–5.2)
PROT SERPL-MCNC: 7.4 G/DL (ref 6–8.5)
RBC # BLD AUTO: 4.86 10*6/MM3 (ref 3.77–5.28)
SODIUM SERPL-SCNC: 140 MMOL/L (ref 136–145)
WBC NRBC COR # BLD: 7.27 10*3/MM3 (ref 3.4–10.8)

## 2021-12-02 PROCEDURE — 36415 COLL VENOUS BLD VENIPUNCTURE: CPT | Performed by: NURSE PRACTITIONER

## 2021-12-02 PROCEDURE — 80053 COMPREHEN METABOLIC PANEL: CPT | Performed by: NURSE PRACTITIONER

## 2021-12-02 PROCEDURE — 85025 COMPLETE CBC W/AUTO DIFF WBC: CPT | Performed by: NURSE PRACTITIONER

## 2021-12-02 PROCEDURE — 99214 OFFICE O/P EST MOD 30 MIN: CPT | Performed by: NURSE PRACTITIONER

## 2021-12-02 PROCEDURE — 82306 VITAMIN D 25 HYDROXY: CPT | Performed by: NURSE PRACTITIONER

## 2021-12-02 PROCEDURE — 84443 ASSAY THYROID STIM HORMONE: CPT | Performed by: NURSE PRACTITIONER

## 2021-12-02 RX ORDER — MIRTAZAPINE 30 MG/1
30 TABLET, FILM COATED ORAL NIGHTLY
COMMUNITY
End: 2022-02-01

## 2021-12-02 NOTE — PROGRESS NOTES
"Chief Complaint  Hypothyroidism    Subjective          Lala Padilla presents to [facility]  History of Present Illness         In office visit     26 year old female presents for follow up     Reason hypothyroidism     duration since 2015          Timing constant         Quality controlled            Severity - mild     Context - diagnosed with routine lab work      Location/size - thyroid      THYROID ULTRASOUND     HISTORY: Hypothyroidism. Acquired hypothyroidism.     The thyroid was scanned with a small parts transducer.     COMPARISON: None     FINDINGS:  The right lobe measures 4.23 cm in length by 1.13 cm AP by 1.23  cm transverse.  The left lobe measures 4.30 cm in length by 1.28 cm AP by 1.14 cm  transverse.  Unremarkable isthmus.     Thyroid of inhomogeneous echotexture.  No focal nodule or mass.     IMPRESSION:  CONCLUSION:  Heterogeneous thyroid which could represent thyroiditis.  No focal nodule or mass.     [id number]     Electronically signed by:  Jm Lee MD  7/26/2019 1:35 PM CDT  Workstation: [id number]        Quantity -     Lab Results   Component Value Date    TSH 2.480 07/09/2021                Symptoms -  fatigue     Alleviating Factors -compliance with  levothyroxine 112 mcg one daily            She takes the medication alone with no other medications or food      Aggravating Factors - none        Ros fatigue         Objective   Vital Signs:   /68   Pulse 108   Ht 167.6 cm (66\")   Wt 55.7 kg (122 lb 11.2 oz)   SpO2 100%   BMI 19.80 kg/m²     Physical Exam  Constitutional:       Appearance: Normal appearance.   Cardiovascular:      Rate and Rhythm: Regular rhythm.      Heart sounds: Normal heart sounds.   Pulmonary:      Breath sounds: Normal breath sounds.   Musculoskeletal:      Cervical back: Normal range of motion.   Neurological:      Mental Status: She is alert.        Result Review :   The following data was reviewed by: " MILLY Alcala on 12/02/2021:  Common labs    Common Labsle 4/27/21 4/27/21 7/9/21 7/9/21 10/12/21    1118 1118 1635 1635    Glucose  98  84 71   BUN  12  10 10   Creatinine  0.94  0.87 0.86   eGFR Non African Am  71  78 79   Sodium  138  138 139   Potassium  4.1  4.2 4.0   Chloride  102  103 102   Calcium  9.0  9.4 9.5   Albumin  3.90  4.50 4.60   Total Bilirubin  0.4  0.3    Alkaline Phosphatase  82  71    AST (SGOT)  32  18    ALT (SGPT)  18  12    WBC 8.30  8.70     Hemoglobin 13.1  13.3     Hematocrit 38.5  39.0     Platelets 226  248                       Assessment and Plan    Diagnoses and all orders for this visit:    1. Vitamin D deficiency (Primary)  -     CBC & Differential  -     Comprehensive Metabolic Panel  -     TSH  -     Vitamin D 25 Hydroxy    2. Hypothyroidism due to Hashimoto's thyroiditis  -     CBC & Differential  -     Comprehensive Metabolic Panel  -     TSH  -     Vitamin D 25 Hydroxy    3. Other fatigue           Hypothyroidism due to hashimoto's               Taking Levothyroxine    112 mcg daily        Lab Results   Component Value Date    TSH 2.480 07/09/2021          Bone Health     Vitamin d def..        Taking MVI daily with vitamin d         Component      Latest Ref Rng & Units 4/27/2021   25 Hydroxy, Vitamin D      ng/ml 31.5             fatigue     Improve TSH              Follow Up   No follow-ups on file.  Patient was given instructions and counseling regarding her condition or for health maintenance advice. Please see specific information pulled into the AVS if appropriate.         This document has been electronically signed by MILLY Alcala on December 2, 2021 08:51 CST.

## 2021-12-03 LAB
25(OH)D3 SERPL-MCNC: 28.5 NG/ML (ref 30–100)
TSH SERPL DL<=0.05 MIU/L-ACNC: 1.58 UIU/ML (ref 0.27–4.2)

## 2021-12-07 ENCOUNTER — TELEPHONE (OUTPATIENT)
Dept: ENDOCRINOLOGY | Facility: CLINIC | Age: 26
End: 2021-12-07

## 2022-01-10 ENCOUNTER — LAB (OUTPATIENT)
Dept: LAB | Facility: HOSPITAL | Age: 27
End: 2022-01-10

## 2022-01-10 ENCOUNTER — OFFICE VISIT (OUTPATIENT)
Dept: FAMILY MEDICINE CLINIC | Facility: CLINIC | Age: 27
End: 2022-01-10

## 2022-01-10 VITALS
HEIGHT: 66 IN | WEIGHT: 122 LBS | DIASTOLIC BLOOD PRESSURE: 70 MMHG | OXYGEN SATURATION: 99 % | BODY MASS INDEX: 19.61 KG/M2 | SYSTOLIC BLOOD PRESSURE: 120 MMHG | HEART RATE: 113 BPM

## 2022-01-10 DIAGNOSIS — R19.7 DIARRHEA, UNSPECIFIED TYPE: ICD-10-CM

## 2022-01-10 DIAGNOSIS — E03.9 HYPOTHYROIDISM, UNSPECIFIED TYPE: Primary | ICD-10-CM

## 2022-01-10 PROCEDURE — 83540 ASSAY OF IRON: CPT | Performed by: NURSE PRACTITIONER

## 2022-01-10 PROCEDURE — 80050 GENERAL HEALTH PANEL: CPT | Performed by: NURSE PRACTITIONER

## 2022-01-10 PROCEDURE — 82306 VITAMIN D 25 HYDROXY: CPT | Performed by: NURSE PRACTITIONER

## 2022-01-10 PROCEDURE — 84480 ASSAY TRIIODOTHYRONINE (T3): CPT | Performed by: NURSE PRACTITIONER

## 2022-01-10 PROCEDURE — 84439 ASSAY OF FREE THYROXINE: CPT | Performed by: NURSE PRACTITIONER

## 2022-01-10 PROCEDURE — 82607 VITAMIN B-12: CPT | Performed by: NURSE PRACTITIONER

## 2022-01-10 PROCEDURE — 99213 OFFICE O/P EST LOW 20 MIN: CPT | Performed by: NURSE PRACTITIONER

## 2022-01-10 PROCEDURE — 36415 COLL VENOUS BLD VENIPUNCTURE: CPT | Performed by: NURSE PRACTITIONER

## 2022-01-10 RX ORDER — DICYCLOMINE HYDROCHLORIDE 10 MG/1
10 CAPSULE ORAL 2 TIMES DAILY PRN
Qty: 20 CAPSULE | Refills: 5 | Status: SHIPPED | OUTPATIENT
Start: 2022-01-10 | End: 2022-06-22

## 2022-01-10 NOTE — PROGRESS NOTES
Chief Complaint   Patient presents with   • Hypothyroidism   • GI Problem     most of the time she has diarrhea ,never solid stool     Subjective   Lala Padilla is a 27 y.o. female.           Presents with diarrhea on and off -reports poor eating habits. Recent change in add medication -last month     Hypothyroidism  This is a chronic problem. The current episode started more than 1 year ago. The problem has been waxing and waning. Associated symptoms include abdominal pain, fatigue and joint swelling. Pertinent negatives include no arthralgias, change in bowel habit, chest pain, chills, congestion, coughing, headaches, myalgias, nausea, neck pain, numbness, rash, sore throat, swollen glands, urinary symptoms, vertigo, visual change, vomiting or weakness. The symptoms are aggravated by stress. She has tried rest (synthroid ) for the symptoms. The treatment provided mild relief.   GI Problem  The primary symptoms include fatigue, abdominal pain and diarrhea. Primary symptoms do not include weight loss, nausea, vomiting, dysuria, myalgias, arthralgias or rash. The onset was gradual. The problem has been gradually worsening.   The illness is also significant for back pain. The illness does not include chills or constipation. Associated medical issues do not include inflammatory bowel disease, GERD, gallstones, liver disease, alcohol abuse, gastric bypass, bowel resection, irritable bowel syndrome, hemorrhoids or diverticulitis.   Thyroid Problem  Presents for follow-up visit. Symptoms include diarrhea, dry skin and fatigue. Patient reports no cold intolerance, constipation, hair loss, heat intolerance, hoarse voice, leg swelling, menstrual problem, nail problem, palpitations, tremors, visual change, weight gain or weight loss. The symptoms have been stable.        The following portions of the patient's history were reviewed and updated as appropriate: allergies, current medications, past social history  and problem list.    Review of Systems   Constitutional: Positive for activity change and fatigue. Negative for appetite change, chills, unexpected weight change, weight gain and weight loss.   HENT: Negative for congestion, dental problem, drooling, ear discharge, ear pain, facial swelling, hearing loss, hoarse voice, mouth sores, nosebleeds, postnasal drip, rhinorrhea, sinus pressure, sinus pain, sneezing, sore throat, tinnitus, trouble swallowing and voice change.         Reports trouble swallowing on and off    Eyes: Negative.  Negative for photophobia, discharge, redness, itching and visual disturbance.   Respiratory: Negative for apnea, cough, choking, chest tightness, shortness of breath, wheezing and stridor.    Cardiovascular: Negative.  Negative for chest pain, palpitations and leg swelling.   Gastrointestinal: Positive for abdominal pain and diarrhea. Negative for abdominal distention, anal bleeding, blood in stool, change in bowel habit, constipation, nausea, rectal pain and vomiting.   Endocrine: Negative for cold intolerance, heat intolerance, polydipsia, polyphagia and polyuria.   Genitourinary: Negative.  Negative for difficulty urinating, dyspareunia, dysuria, genital sores, hematuria and menstrual problem.   Musculoskeletal: Positive for back pain, gait problem and joint swelling. Negative for arthralgias, myalgias, neck pain and neck stiffness.        Reports feet turning blue on and off    Skin: Positive for color change and pallor. Negative for rash and wound.        Reports color changes extremities    Allergic/Immunologic: Negative.  Negative for environmental allergies, food allergies and immunocompromised state.   Neurological: Negative for dizziness, vertigo, tremors, seizures, syncope, facial asymmetry, speech difficulty, weakness, light-headedness, numbness and headaches.   Hematological: Negative.  Negative for adenopathy. Does not bruise/bleed easily.   Psychiatric/Behavioral: Positive  "for sleep disturbance. Negative for agitation, behavioral problems, decreased concentration, dysphoric mood, hallucinations and self-injury. The patient is not hyperactive.         Currently treating for add        Objective   /70   Pulse 113   Ht 167.6 cm (66\")   Wt 55.3 kg (122 lb)   SpO2 99%   BMI 19.69 kg/m²   Physical Exam  Vitals and nursing note reviewed.   Constitutional:       General: She is not in acute distress.     Appearance: Normal appearance. She is not ill-appearing or diaphoretic.   HENT:      Head: Normocephalic.      Right Ear: Tympanic membrane normal.      Nose: Nose normal.      Mouth/Throat:      Mouth: Mucous membranes are moist.   Eyes:      Pupils: Pupils are equal, round, and reactive to light.   Neck:      Vascular: No carotid bruit.   Cardiovascular:      Rate and Rhythm: Normal rate and regular rhythm.      Pulses: Normal pulses.      Heart sounds: No murmur heard.  No friction rub. No gallop.    Pulmonary:      Effort: Pulmonary effort is normal. No respiratory distress.      Breath sounds: No stridor. No wheezing, rhonchi or rales.   Chest:      Chest wall: No tenderness.   Abdominal:      General: Abdomen is flat. There is no distension.      Palpations: There is no mass.      Tenderness: There is abdominal tenderness. There is no guarding or rebound.      Hernia: No hernia is present.      Comments: Diffuse tenderness throughout abdomen    Musculoskeletal:         General: No swelling, tenderness, deformity or signs of injury. Normal range of motion.      Cervical back: Normal range of motion. No rigidity or tenderness.   Lymphadenopathy:      Cervical: No cervical adenopathy.   Skin:     General: Skin is warm.      Coloration: Skin is not jaundiced or pale.      Findings: Rash present. No bruising, erythema or lesion.      Comments: Discolored toes and fingers probably raynauds     Petechial rash to abdomen and legs   Neurological:      General: No focal deficit " present.      Mental Status: She is alert and oriented to person, place, and time.      Cranial Nerves: No cranial nerve deficit.      Sensory: No sensory deficit.      Motor: No weakness.      Coordination: Coordination normal.   Psychiatric:         Mood and Affect: Mood normal.         Behavior: Behavior normal.              Assessment/Plan     Problems Addressed this Visit        Endocrine and Metabolic    Hypothyroidism - Primary    Relevant Orders    CBC & Differential    Comprehensive Metabolic Panel    Iron    TSH    T3    T4, Free    Vitamin B12    Vitamin D 25 Hydroxy      Other Visit Diagnoses     Diarrhea, unspecified type        Relevant Orders    Ambulatory Referral to Gastroenterology      Diagnoses       Codes Comments    Hypothyroidism, unspecified type    -  Primary ICD-10-CM: E03.9  ICD-9-CM: 244.9     Diarrhea, unspecified type     ICD-10-CM: R19.7  ICD-9-CM: 787.91            New Medications Ordered This Visit   Medications   • dicyclomine (Bentyl) 10 MG capsule     Sig: Take 1 capsule by mouth 2 (Two) Times a Day As Needed (bid prn).     Dispense:  20 capsule     Refill:  5     Current Outpatient Medications on File Prior to Visit   Medication Sig Dispense Refill   • amphetamine-dextroamphetamine (ADDERALL) 20 MG tablet Take 1 tablet by mouth 2 (Two) Times a Day.     • Cholecalciferol (VITAMIN D) 25 MCG (1000 UT) tablet Take 1 tablet by mouth Daily. 90 tablet 3   • levothyroxine (Synthroid) 112 MCG tablet Take 1 tablet by mouth Daily. 30 tablet 11   • mirtazapine (REMERON) 30 MG tablet Take 30 mg by mouth Every Night.     • mupirocin (Bactroban) 2 % ointment Apply  topically to the appropriate area as directed 3 (Three) Times a Day. 30 g 1     No current facility-administered medications on file prior to visit.       20 minutes   Follow Up   No follow-ups on file.        It's not just what you eat, but when you eat  Eat breakfast, and eat smaller meals throughout the day. A healthy breakfast  can jumpstart your metabolism, while eating small, healthy meals (rather than the standard three large meals) keeps your energy up.   Avoid eating at night. Try to eat dinner earlier and fast for 14-16 hours until breakfast the next morning. Studies suggest that eating only when you’re most active and giving your digestive system a long break each day may help to regulate weight.     Add bentyl bid prn, diet changes discussed in detail, meds as directed, see back sooner if worsen, refer to gastro, bland diet discussed

## 2022-01-11 LAB
25(OH)D3 SERPL-MCNC: 26.9 NG/ML
ALBUMIN SERPL-MCNC: 4.5 G/DL (ref 3.5–5.2)
ALBUMIN/GLOB SERPL: 2.3 G/DL
ALP SERPL-CCNC: 77 U/L (ref 39–117)
ALT SERPL W P-5'-P-CCNC: 15 U/L (ref 1–33)
ANION GAP SERPL CALCULATED.3IONS-SCNC: 10 MMOL/L (ref 5–15)
AST SERPL-CCNC: 19 U/L (ref 1–32)
BASOPHILS # BLD AUTO: 0.03 10*3/MM3 (ref 0–0.2)
BASOPHILS NFR BLD AUTO: 0.4 % (ref 0–1.5)
BILIRUB SERPL-MCNC: 0.3 MG/DL (ref 0–1.2)
BUN SERPL-MCNC: 14 MG/DL (ref 6–20)
BUN/CREAT SERPL: 17.9 (ref 7–25)
CALCIUM SPEC-SCNC: 9.8 MG/DL (ref 8.6–10.5)
CHLORIDE SERPL-SCNC: 101 MMOL/L (ref 98–107)
CO2 SERPL-SCNC: 27 MMOL/L (ref 22–29)
CREAT SERPL-MCNC: 0.78 MG/DL (ref 0.57–1)
DEPRECATED RDW RBC AUTO: 41 FL (ref 37–54)
EOSINOPHIL # BLD AUTO: 0.12 10*3/MM3 (ref 0–0.4)
EOSINOPHIL NFR BLD AUTO: 1.5 % (ref 0.3–6.2)
ERYTHROCYTE [DISTWIDTH] IN BLOOD BY AUTOMATED COUNT: 13.7 % (ref 12.3–15.4)
GFR SERPL CREATININE-BSD FRML MDRD: 88 ML/MIN/1.73
GLOBULIN UR ELPH-MCNC: 2 GM/DL
GLUCOSE SERPL-MCNC: 82 MG/DL (ref 65–99)
HCT VFR BLD AUTO: 39.3 % (ref 34–46.6)
HGB BLD-MCNC: 13.6 G/DL (ref 12–15.9)
IMM GRANULOCYTES # BLD AUTO: 0.03 10*3/MM3 (ref 0–0.05)
IMM GRANULOCYTES NFR BLD AUTO: 0.4 % (ref 0–0.5)
IRON 24H UR-MRATE: 36 MCG/DL (ref 37–145)
LYMPHOCYTES # BLD AUTO: 1.38 10*3/MM3 (ref 0.7–3.1)
LYMPHOCYTES NFR BLD AUTO: 16.8 % (ref 19.6–45.3)
MCH RBC QN AUTO: 28.6 PG (ref 26.6–33)
MCHC RBC AUTO-ENTMCNC: 34.6 G/DL (ref 31.5–35.7)
MCV RBC AUTO: 82.6 FL (ref 79–97)
MONOCYTES # BLD AUTO: 0.6 10*3/MM3 (ref 0.1–0.9)
MONOCYTES NFR BLD AUTO: 7.3 % (ref 5–12)
NEUTROPHILS NFR BLD AUTO: 6.05 10*3/MM3 (ref 1.7–7)
NEUTROPHILS NFR BLD AUTO: 73.6 % (ref 42.7–76)
NRBC BLD AUTO-RTO: 0 /100 WBC (ref 0–0.2)
PLATELET # BLD AUTO: 268 10*3/MM3 (ref 140–450)
PMV BLD AUTO: 9.5 FL (ref 6–12)
POTASSIUM SERPL-SCNC: 4.4 MMOL/L (ref 3.5–5.2)
PROT SERPL-MCNC: 6.5 G/DL (ref 6–8.5)
RBC # BLD AUTO: 4.76 10*6/MM3 (ref 3.77–5.28)
SODIUM SERPL-SCNC: 138 MMOL/L (ref 136–145)
T3 SERPL-MCNC: 112 NG/DL (ref 80–200)
T4 FREE SERPL-MCNC: 1.39 NG/DL (ref 0.93–1.7)
TSH SERPL DL<=0.05 MIU/L-ACNC: 6.71 UIU/ML (ref 0.27–4.2)
VIT B12 BLD-MCNC: 486 PG/ML (ref 211–946)
WBC NRBC COR # BLD: 8.21 10*3/MM3 (ref 3.4–10.8)

## 2022-01-12 ENCOUNTER — TELEPHONE (OUTPATIENT)
Dept: FAMILY MEDICINE CLINIC | Facility: CLINIC | Age: 27
End: 2022-01-12

## 2022-01-12 RX ORDER — LEVOTHYROXINE SODIUM 0.12 MG/1
125 TABLET ORAL DAILY
Qty: 90 TABLET | Refills: 1 | Status: SHIPPED | OUTPATIENT
Start: 2022-01-12 | End: 2022-08-08 | Stop reason: SDUPTHER

## 2022-01-12 NOTE — PROGRESS NOTES
Per MILLY Guevara, Ms. Brothivan has been called with recent lab results & recommendations.  Continue current medications and follow-up as planned or sooner if any problems.     Paola,   She states she is taking her medication as prescribed and has not missed a dose.   I have sent her new script to her pharmacy.

## 2022-01-12 NOTE — TELEPHONE ENCOUNTER
Per MILLY Guevara, Ms. Padilla has been called with recent lab results & recommendations.  Continue current medications and follow-up as planned or sooner if any problems.     Paola,   She states she is taking her medication as prescribed and has not missed a dose.   I have sent her new script to her pharmacy.           ----- Message from MILLY Keen sent at 1/12/2022  8:00 AM CST -----  Make sure she is taking her thyroid medication -her numbers are up a little -if she is taking her meds then needs to go to 125mcg daily-also needs otc iron supplements daily slow iron daily

## 2022-02-01 ENCOUNTER — OFFICE VISIT (OUTPATIENT)
Dept: GASTROENTEROLOGY | Facility: CLINIC | Age: 27
End: 2022-02-01

## 2022-02-01 ENCOUNTER — LAB (OUTPATIENT)
Dept: LAB | Facility: HOSPITAL | Age: 27
End: 2022-02-01

## 2022-02-01 VITALS
WEIGHT: 122.8 LBS | SYSTOLIC BLOOD PRESSURE: 117 MMHG | HEART RATE: 97 BPM | BODY MASS INDEX: 19.73 KG/M2 | HEIGHT: 66 IN | DIASTOLIC BLOOD PRESSURE: 70 MMHG

## 2022-02-01 DIAGNOSIS — R11.0 NAUSEA: ICD-10-CM

## 2022-02-01 DIAGNOSIS — R19.7 DIARRHEA, UNSPECIFIED TYPE: ICD-10-CM

## 2022-02-01 DIAGNOSIS — R10.84 GENERALIZED ABDOMINAL PAIN: Primary | ICD-10-CM

## 2022-02-01 DIAGNOSIS — R10.84 GENERALIZED ABDOMINAL PAIN: ICD-10-CM

## 2022-02-01 PROBLEM — F90.9 ATTENTION DEFICIT HYPERACTIVITY DISORDER: Status: ACTIVE | Noted: 2020-10-20

## 2022-02-01 PROBLEM — N20.0 KIDNEY STONE: Status: ACTIVE | Noted: 2019-09-17

## 2022-02-01 PROCEDURE — 86003 ALLG SPEC IGE CRUDE XTRC EA: CPT

## 2022-02-01 PROCEDURE — 99214 OFFICE O/P EST MOD 30 MIN: CPT | Performed by: NURSE PRACTITIONER

## 2022-02-01 PROCEDURE — 86258 DGP ANTIBODY EACH IG CLASS: CPT

## 2022-02-01 PROCEDURE — 86364 TISS TRNSGLTMNASE EA IG CLAS: CPT

## 2022-02-01 RX ORDER — DEXTROSE AND SODIUM CHLORIDE 5; .45 G/100ML; G/100ML
30 INJECTION, SOLUTION INTRAVENOUS CONTINUOUS PRN
Status: CANCELLED | OUTPATIENT
Start: 2022-02-28

## 2022-02-01 RX ORDER — SODIUM, POTASSIUM,MAG SULFATES 17.5-3.13G
1 SOLUTION, RECONSTITUTED, ORAL ORAL EVERY 12 HOURS
Qty: 354 ML | Refills: 0 | Status: SHIPPED | OUTPATIENT
Start: 2022-02-01 | End: 2022-03-07

## 2022-02-01 RX ORDER — ZOLPIDEM TARTRATE 10 MG/1
5-10 TABLET ORAL NIGHTLY PRN
COMMUNITY
Start: 2021-12-27 | End: 2022-11-18 | Stop reason: SINTOL

## 2022-02-01 RX ORDER — ATOMOXETINE 40 MG/1
CAPSULE ORAL
COMMUNITY
Start: 2022-01-26 | End: 2022-02-25

## 2022-02-01 NOTE — PATIENT INSTRUCTIONS
MyPlate from USDA    MyPlate is an outline of a general healthy diet based on the 2010 Dietary Guidelines for Americans, from the U.S. Department of Agriculture (USDA). It sets guidelines for how much food you should eat from each food group based on your age, sex, and level of physical activity.  What are tips for following MyPlate?  To follow MyPlate recommendations:  · Eat a wide variety of fruits and vegetables, grains, and protein foods.  · Serve smaller portions and eat less food throughout the day.  · Limit portion sizes to avoid overeating.  · Enjoy your food.  · Get at least 150 minutes of exercise every week. This is about 30 minutes each day, 5 or more days per week.  It can be difficult to have every meal look like MyPlate. Think about MyPlate as eating guidelines for an entire day, rather than each individual meal.  Fruits and vegetables  · Make half of your plate fruits and vegetables.  · Eat many different colors of fruits and vegetables each day.  · For a 2,000 calorie daily food plan, eat:  ? 2½ cups of vegetables every day.  ? 2 cups of fruit every day.  · 1 cup is equal to:  ? 1 cup raw or cooked vegetables.  ? 1 cup raw fruit.  ? 1 medium-sized orange, apple, or banana.  ? 1 cup 100% fruit or vegetable juice.  ? 2 cups raw leafy greens, such as lettuce, spinach, or kale.  ? ½ cup dried fruit.  Grains  · One fourth of your plate should be grains.  · Make at least half of the grains you eat each day whole grains.  · For a 2,000 calorie daily food plan, eat 6 oz of grains every day.  · 1 oz is equal to:  ? 1 slice bread.  ? 1 cup cereal.  ? ½ cup cooked rice, cereal, or pasta.  Protein  · One fourth of your plate should be protein.  · Eat a wide variety of protein foods, including meat, poultry, fish, eggs, beans, nuts, and tofu.  · For a 2,000 calorie daily food plan, eat 5½ oz of protein every day.  · 1 oz is equal to:  ? 1 oz meat, poultry, or fish.  ? ¼ cup cooked beans.  ? 1 egg.  ? ½ oz nuts  or seeds.  ? 1 Tbsp peanut butter.  Dairy  · Drink fat-free or low-fat (1%) milk.  · Eat or drink dairy as a side to meals.  · For a 2,000 calorie daily food plan, eat or drink 3 cups of dairy every day.  · 1 cup is equal to:  ? 1 cup milk, yogurt, cottage cheese, or soy milk (soy beverage).  ? 2 oz processed cheese.  ? 1½ oz natural cheese.  Fats, oils, salt, and sugars  · Only small amounts of oils are recommended.  · Avoid foods that are high in calories and low in nutritional value (empty calories), like foods high in fat or added sugars.  · Choose foods that are low in salt (sodium). Choose foods that have less than 140 milligrams (mg) of sodium per serving.  · Drink water instead of sugary drinks. Drink enough water each day to keep your urine pale yellow.  Where to find support  · Work with your health care provider or a nutrition specialist (dietitian) to develop a customized eating plan that is right for you.  · Download an vita (mobile application) to help you track your daily food intake.  Where to find more information  · Go to ChooseMyPlate.gov for more information.  Summary  · MyPlate is a general guideline for healthy eating from the USDA. It is based on the 2010 Dietary Guidelines for Americans.  · In general, fruits and vegetables should take up ½ of your plate, grains should take up ¼ of your plate, and protein should take up ¼ of your plate.  This information is not intended to replace advice given to you by your health care provider. Make sure you discuss any questions you have with your health care provider.  Document Revised: 05/21/2020 Document Reviewed: 03/19/2018  Elsevier Patient Education © 2021 Elsevier Inc.

## 2022-02-01 NOTE — PROGRESS NOTES
Chief Complaint   Patient presents with   • Diarrhea       Subjective    Lala Padilla is a 27 y.o. female. she is here today for follow-up.    27-year-old female presents to discuss abnormal bowel habits diarrhea and abdominal pain.  Reports symptoms have been ongoing for several years states that she has a formed stool that is more regular for her.  She reports intermittent mucus generally avoids dairy lettuce any known triggers but lately seems to occur with standing intake.  Reports intermittent blood in stool.  States symptoms became severe last August and she was seen at [facility] had CT which showed no acute abnormality in the GI tract and symptoms were attributed to ovarian cyst she has follow-up with gynecologist regarding that.  Reports when she eats certain trigger she will note a rash on her abdomen     Diarrhea   This is a chronic problem. The current episode started more than 1 year ago. The problem occurs 2 to 4 times per day. The problem has been waxing and waning. The stool consistency is described as watery and mucous (dark color recently ). The patient states that diarrhea awakens her from sleep. Associated symptoms include abdominal pain, bloating and increased flatus. Pertinent negatives include no arthralgias, chills, coughing, fever, headaches, myalgias, sweats, URI, vomiting or weight loss.     Plan; schedule patient for EGD and colonoscopy with biopsy rule out inflammatory bowel disease or microscopic colitis.  Also obtain recurrent GI distress panel to rule out allergic response.  Continue with bland diet and avoidance of known triggers.  Follow-up after test return office sooner if needed       The following portions of the patient's history were reviewed and updated as appropriate:   Past Medical History:   Diagnosis Date   • Acute tonsillitis    • Bronchitis    • Cellulitis     History of abscess, near left axilla      • Contusion of knee    • Depressive  disorder    • Diarrhea    • Disease of thyroid gland    • Dysmenorrhea 9/28/2017   • Dysuria    • Encounter for gynecological examination (general) (routine) without abnormal findings    • Encounter for removal of sutures    • Encounter for surveillance of contraceptive pills    • Folliculitis    • Fracture of phalanx of hand     Left thumb      • Gastroenteritis    • Generalized anxiety disorder    • Hypothyroidism, unspecified    • Irregular periods    • Kidney stone    • Knee pain    • Lipoma    • Ovarian cyst    • Pain in joint, ankle and foot    • Pain in wrist    • Panic disorder    • Ruptured cyst of ovary    • Sports physical    • Sprain of ankle    • Strain of neck muscle    • Tick bite    • Upper respiratory infection      Past Surgical History:   Procedure Laterality Date   • ADENOIDECTOMY     • INCISION AND DRAINAGE ABSCESS  02/04/2009    Left axillary abscess   • INJECTION OF MEDICATION  05/31/2014    Toradol(1)   • INJECTION OF MEDICATION  05/31/2014    Zofran(1)   • KIDNEY STONE SURGERY     • TONSILLECTOMY       Family History   Problem Relation Age of Onset   • Cancer Other    • Diabetes Other    • Heart disease Other    • Hypertension Other    • Thyroid disease Other      OB History    No obstetric history on file.       Prior to Admission medications    Medication Sig Start Date End Date Taking? Authorizing Provider   amphetamine-dextroamphetamine (ADDERALL) 20 MG tablet Take 1 tablet by mouth 2 (Two) Times a Day. 12/30/20 2/1/22 Yes Provider, MD Kole   atomoxetine (STRATTERA) 40 MG capsule TAKE 1 CAPSULE BY MOUTH ONCE DAILY AS DIRECTED 1/26/22   ProviderKole MD   Cholecalciferol (VITAMIN D) 25 MCG (1000 UT) tablet Take 1 tablet by mouth Daily. 11/22/19   Paola Snyder APRN   dicyclomine (Bentyl) 10 MG capsule Take 1 capsule by mouth 2 (Two) Times a Day As Needed (bid prn). 1/10/22   Paola Snyder APRN   levothyroxine (Synthroid) 125 MCG tablet Take 1 tablet by  "mouth Daily. 1/12/22   Paola Snyder APRN   mupirocin (Bactroban) 2 % ointment Apply  topically to the appropriate area as directed 3 (Three) Times a Day. 8/26/21   Paola Snyder APRN   zolpidem (AMBIEN) 10 MG tablet Take 5-10 mg by mouth At Night As Needed. 12/27/21   ProviderKole MD   mirtazapine (REMERON) 30 MG tablet Take 30 mg by mouth Every Night.  2/1/22  Provider, MD Kole     No Known Allergies  Social History     Socioeconomic History   • Marital status: Single   Tobacco Use   • Smoking status: Never Smoker   • Smokeless tobacco: Never Used   Substance and Sexual Activity   • Alcohol use: No   • Drug use: No   • Sexual activity: Not Currently     Birth control/protection: None       Review of Systems  Review of Systems   Constitutional: Positive for fatigue. Negative for activity change, appetite change, chills, diaphoresis, fever, unexpected weight change and weight loss.   HENT: Negative for trouble swallowing.    Respiratory: Negative for cough.    Gastrointestinal: Positive for abdominal distention (bloating ), abdominal pain, bloating, blood in stool, diarrhea, flatus and nausea. Negative for anal bleeding, constipation, rectal pain and vomiting.   Musculoskeletal: Negative for arthralgias and myalgias.   Neurological: Negative for headaches.        /70 (BP Location: Left arm)   Pulse 97   Ht 167.6 cm (66\")   Wt 55.7 kg (122 lb 12.8 oz)   BMI 19.82 kg/m²     Objective    Physical Exam  Constitutional:       General: She is not in acute distress.     Appearance: Normal appearance. She is normal weight. She is not ill-appearing.   HENT:      Head: Normocephalic and atraumatic.   Pulmonary:      Effort: Pulmonary effort is normal.   Abdominal:      General: Bowel sounds are normal. There is no distension.      Palpations: Abdomen is soft. There is no mass.      Tenderness: There is generalized abdominal tenderness (most left side ).   Neurological:      Mental " Status: She is alert.       Office Visit on 01/10/2022   Component Date Value Ref Range Status   • Glucose 01/10/2022 82  65 - 99 mg/dL Final   • BUN 01/10/2022 14  6 - 20 mg/dL Final   • Creatinine 01/10/2022 0.78  0.57 - 1.00 mg/dL Final   • Sodium 01/10/2022 138  136 - 145 mmol/L Final   • Potassium 01/10/2022 4.4  3.5 - 5.2 mmol/L Final   • Chloride 01/10/2022 101  98 - 107 mmol/L Final   • CO2 01/10/2022 27.0  22.0 - 29.0 mmol/L Final   • Calcium 01/10/2022 9.8  8.6 - 10.5 mg/dL Final   • Total Protein 01/10/2022 6.5  6.0 - 8.5 g/dL Final   • Albumin 01/10/2022 4.50  3.50 - 5.20 g/dL Final   • ALT (SGPT) 01/10/2022 15  1 - 33 U/L Final   • AST (SGOT) 01/10/2022 19  1 - 32 U/L Final   • Alkaline Phosphatase 01/10/2022 77  39 - 117 U/L Final   • Total Bilirubin 01/10/2022 0.3  0.0 - 1.2 mg/dL Final   • eGFR Non  Amer 01/10/2022 88  >60 mL/min/1.73 Final   • Globulin 01/10/2022 2.0  gm/dL Final   • A/G Ratio 01/10/2022 2.3  g/dL Final   • BUN/Creatinine Ratio 01/10/2022 17.9  7.0 - 25.0 Final   • Anion Gap 01/10/2022 10.0  5.0 - 15.0 mmol/L Final   • Iron 01/10/2022 36* 37 - 145 mcg/dL Final   • TSH 01/10/2022 6.710* 0.270 - 4.200 uIU/mL Final   • T3, Total 01/10/2022 112.0  80.0 - 200.0 ng/dl Final   • Free T4 01/10/2022 1.39  0.93 - 1.70 ng/dL Final   • Vitamin B-12 01/10/2022 486  211 - 946 pg/mL Final   • 25 Hydroxy, Vitamin D 01/10/2022 26.9  ng/ml Final   • WBC 01/10/2022 8.21  3.40 - 10.80 10*3/mm3 Final   • RBC 01/10/2022 4.76  3.77 - 5.28 10*6/mm3 Final   • Hemoglobin 01/10/2022 13.6  12.0 - 15.9 g/dL Final   • Hematocrit 01/10/2022 39.3  34.0 - 46.6 % Final   • MCV 01/10/2022 82.6  79.0 - 97.0 fL Final   • MCH 01/10/2022 28.6  26.6 - 33.0 pg Final   • MCHC 01/10/2022 34.6  31.5 - 35.7 g/dL Final   • RDW 01/10/2022 13.7  12.3 - 15.4 % Final   • RDW-SD 01/10/2022 41.0  37.0 - 54.0 fl Final   • MPV 01/10/2022 9.5  6.0 - 12.0 fL Final   • Platelets 01/10/2022 268  140 - 450 10*3/mm3 Final   •  Neutrophil % 01/10/2022 73.6  42.7 - 76.0 % Final   • Lymphocyte % 01/10/2022 16.8* 19.6 - 45.3 % Final   • Monocyte % 01/10/2022 7.3  5.0 - 12.0 % Final   • Eosinophil % 01/10/2022 1.5  0.3 - 6.2 % Final   • Basophil % 01/10/2022 0.4  0.0 - 1.5 % Final   • Immature Grans % 01/10/2022 0.4  0.0 - 0.5 % Final   • Neutrophils, Absolute 01/10/2022 6.05  1.70 - 7.00 10*3/mm3 Final   • Lymphocytes, Absolute 01/10/2022 1.38  0.70 - 3.10 10*3/mm3 Final   • Monocytes, Absolute 01/10/2022 0.60  0.10 - 0.90 10*3/mm3 Final   • Eosinophils, Absolute 01/10/2022 0.12  0.00 - 0.40 10*3/mm3 Final   • Basophils, Absolute 01/10/2022 0.03  0.00 - 0.20 10*3/mm3 Final   • Immature Grans, Absolute 01/10/2022 0.03  0.00 - 0.05 10*3/mm3 Final   • nRBC 01/10/2022 0.0  0.0 - 0.2 /100 WBC Final     Assessment/Plan      1. Generalized abdominal pain    2. Diarrhea, unspecified type    3. Nausea    .       Orders placed during this encounter include:  Orders Placed This Encounter   Procedures   • COVID PRE-OP / PRE-PROCEDURE SCREENING ORDER (NO ISOLATION) - Swab, Nasopharynx     Standing Status:   Future     Standing Expiration Date:   2/1/2023     Order Specific Question:   Release to patient     Answer:   Immediate     Order Specific Question:   Please select your location:     Answer:   [facility]     Order Specific Question:   COVID Screening Order:     Answer:   In-House: PRE-OP: BD MAX, 8-10 HR TAT [[id number]]     Order Specific Question:   Previously tested for COVID-19?     Answer:   No     Order Specific Question:   Employed in healthcare setting?     Answer:   No     Order Specific Question:   Symptomatic for COVID-19 as defined by CDC?     Answer:   No     Order Specific Question:   Hospitalized for COVID-19?     Answer:   No     Order Specific Question:   Admitted to ICU for COVID-19?     Answer:   No     Order Specific Question:   Resident in a congregate (group) care setting?     Answer:   No     Order Specific Question:    Pregnant?     Answer:   No   • Recurrent Gastrointestinal Distress     Standing Status:   Future     Standing Expiration Date:   2/1/2023     Order Specific Question:   Release to patient     Answer:   Immediate   • Follow Anesthesia Guidelines / Standing Orders     Standing Status:   Future   • Obtain Informed Consent     Standing Status:   Future     Order Specific Question:   Informed Consent Given For     Answer:   ESOPHAGOGASTRODUODENOSCOPY and Colonoscopy       ESOPHAGOGASTRODUODENOSCOPY (N/A), COLONOSCOPY (N/A)    Review and/or summary of lab tests, radiology, procedures, medications. Review and summary of old records and obtaining of history. The risks and benefits of my recommendations, as well as other treatment options were discussed with the patient today. Questions were answered.    New Medications Ordered This Visit   Medications   • sodium-potassium-magnesium sulfates (Suprep Bowel Prep Kit) 17.5-3.13-1.6 GM/177ML solution oral solution     Sig: Take 1 bottle by mouth Every 12 (Twelve) Hours.     Dispense:  354 mL     Refill:  0       Follow-up: Return in about 4 weeks (around 3/1/2022) for Recheck, After test.          This document has been electronically signed by MILLY Torres on February 1, 2022 10:31 CST           I spent 28 minutes caring for Lala on this date of service. This time includes time spent by me in the following activities:preparing for the visit, reviewing tests, obtaining and/or reviewing a separately obtained history, performing a medically appropriate examination and/or evaluation , counseling and educating the patient/family/caregiver, ordering medications, tests, or procedures, referring and communicating with other health care professionals , documenting information in the medical record and care coordination    Results for orders placed or performed in visit on 01/10/22   CBC Auto Differential    Specimen: Blood   Result Value Ref Range    WBC 8.21 3.40 - 10.80  10*3/mm3    RBC 4.76 3.77 - 5.28 10*6/mm3    Hemoglobin 13.6 12.0 - 15.9 g/dL    Hematocrit 39.3 34.0 - 46.6 %    MCV 82.6 79.0 - 97.0 fL    MCH 28.6 26.6 - 33.0 pg    MCHC 34.6 31.5 - 35.7 g/dL    RDW 13.7 12.3 - 15.4 %    RDW-SD 41.0 37.0 - 54.0 fl    MPV 9.5 6.0 - 12.0 fL    Platelets 268 140 - 450 10*3/mm3    Neutrophil % 73.6 42.7 - 76.0 %    Lymphocyte % 16.8 (L) 19.6 - 45.3 %    Monocyte % 7.3 5.0 - 12.0 %    Eosinophil % 1.5 0.3 - 6.2 %    Basophil % 0.4 0.0 - 1.5 %    Immature Grans % 0.4 0.0 - 0.5 %    Neutrophils, Absolute 6.05 1.70 - 7.00 10*3/mm3    Lymphocytes, Absolute 1.38 0.70 - 3.10 10*3/mm3    Monocytes, Absolute 0.60 0.10 - 0.90 10*3/mm3    Eosinophils, Absolute 0.12 0.00 - 0.40 10*3/mm3    Basophils, Absolute 0.03 0.00 - 0.20 10*3/mm3    Immature Grans, Absolute 0.03 0.00 - 0.05 10*3/mm3    nRBC 0.0 0.0 - 0.2 /100 WBC   Vitamin D 25 Hydroxy    Specimen: Blood   Result Value Ref Range    25 Hydroxy, Vitamin D 26.9 ng/ml   T3    Specimen: Blood   Result Value Ref Range    T3, Total 112.0 80.0 - 200.0 ng/dl   TSH    Specimen: Blood   Result Value Ref Range    TSH 6.710 (H) 0.270 - 4.200 uIU/mL   T4, Free    Specimen: Blood   Result Value Ref Range    Free T4 1.39 0.93 - 1.70 ng/dL   Iron    Specimen: Blood   Result Value Ref Range    Iron 36 (L) 37 - 145 mcg/dL   Vitamin B12    Specimen: Blood   Result Value Ref Range    Vitamin B-12 486 211 - 946 pg/mL   Comprehensive Metabolic Panel    Specimen: Blood   Result Value Ref Range    Glucose 82 65 - 99 mg/dL    BUN 14 6 - 20 mg/dL    Creatinine 0.78 0.57 - 1.00 mg/dL    Sodium 138 136 - 145 mmol/L    Potassium 4.4 3.5 - 5.2 mmol/L    Chloride 101 98 - 107 mmol/L    CO2 27.0 22.0 - 29.0 mmol/L    Calcium 9.8 8.6 - 10.5 mg/dL    Total Protein 6.5 6.0 - 8.5 g/dL    Albumin 4.50 3.50 - 5.20 g/dL    ALT (SGPT) 15 1 - 33 U/L    AST (SGOT) 19 1 - 32 U/L    Alkaline Phosphatase 77 39 - 117 U/L    Total Bilirubin 0.3 0.0 - 1.2 mg/dL    eGFR Non African Amer 88  >60 mL/min/1.73    Globulin 2.0 gm/dL    A/G Ratio 2.3 g/dL    BUN/Creatinine Ratio 17.9 7.0 - 25.0    Anion Gap 10.0 5.0 - 15.0 mmol/L   Results for orders placed or performed in visit on 12/02/21   CBC Auto Differential    Specimen: Blood   Result Value Ref Range    WBC 7.27 3.40 - 10.80 10*3/mm3    RBC 4.86 3.77 - 5.28 10*6/mm3    Hemoglobin 13.9 12.0 - 15.9 g/dL    Hematocrit 41.3 34.0 - 46.6 %    MCV 85.0 79.0 - 97.0 fL    MCH 28.6 26.6 - 33.0 pg    MCHC 33.7 31.5 - 35.7 g/dL    RDW 13.6 12.3 - 15.4 %    RDW-SD 42.0 37.0 - 54.0 fl    MPV 8.8 6.0 - 12.0 fL    Platelets 250 140 - 450 10*3/mm3    Neutrophil % 71.6 42.7 - 76.0 %    Lymphocyte % 16.6 (L) 19.6 - 45.3 %    Monocyte % 7.4 5.0 - 12.0 %    Eosinophil % 3.4 0.3 - 6.2 %    Basophil % 0.7 0.0 - 1.5 %    Immature Grans % 0.3 0.0 - 0.5 %    Neutrophils, Absolute 5.20 1.70 - 7.00 10*3/mm3    Lymphocytes, Absolute 1.21 0.70 - 3.10 10*3/mm3    Monocytes, Absolute 0.54 0.10 - 0.90 10*3/mm3    Eosinophils, Absolute 0.25 0.00 - 0.40 10*3/mm3    Basophils, Absolute 0.05 0.00 - 0.20 10*3/mm3    Immature Grans, Absolute 0.02 0.00 - 0.05 10*3/mm3    nRBC 0.0 0.0 - 0.2 /100 WBC   Vitamin D 25 Hydroxy    Specimen: Blood   Result Value Ref Range    25 Hydroxy, Vitamin D 28.5 (L) 30.0 - 100.0 ng/ml   TSH    Specimen: Blood   Result Value Ref Range    TSH 1.580 0.270 - 4.200 uIU/mL   Comprehensive Metabolic Panel    Specimen: Blood   Result Value Ref Range    Glucose 88 65 - 99 mg/dL    BUN 13 6 - 20 mg/dL    Creatinine 1.02 (H) 0.57 - 1.00 mg/dL    Sodium 140 136 - 145 mmol/L    Potassium 4.4 3.5 - 5.2 mmol/L    Chloride 104 98 - 107 mmol/L    CO2 29.0 22.0 - 29.0 mmol/L    Calcium 9.4 8.6 - 10.5 mg/dL    Total Protein 7.4 6.0 - 8.5 g/dL    Albumin 4.60 3.50 - 5.20 g/dL    ALT (SGPT) 15 1 - 33 U/L    AST (SGOT) 21 1 - 32 U/L    Alkaline Phosphatase 80 39 - 117 U/L    Total Bilirubin 0.4 0.0 - 1.2 mg/dL    eGFR Non African Amer 65 >60 mL/min/1.73    Globulin 2.8 gm/dL     A/G Ratio 1.6 g/dL    BUN/Creatinine Ratio 12.7 7.0 - 25.0    Anion Gap 7.0 5.0 - 15.0 mmol/L   Results for orders placed or performed in visit on 10/12/21   Urinalysis, Microscopic Only - Urine, Clean Catch    Specimen: Urine, Clean Catch   Result Value Ref Range    RBC, UA 13-20 (A) None Seen, 0-2 /HPF    WBC, UA 3-5 (A) None Seen, 0-2 /HPF    Bacteria, UA 2+ (A) None Seen /HPF    Squamous Epithelial Cells, UA 3-6 (A) None Seen, 0-2 /HPF    Hyaline Casts, UA 0-2 None Seen /LPF    Methodology Automated Microscopy      *Note: Due to a large number of results and/or encounters for the requested time period, some results have not been displayed. A complete set of results can be found in Results Review.

## 2022-02-02 LAB
GLIADIN PEPTIDE IGA SER-ACNC: 3 UNITS (ref 0–19)
GLIADIN PEPTIDE IGG SER-ACNC: 5 UNITS (ref 0–19)
TTG IGA SER-ACNC: <2 U/ML (ref 0–3)
TTG IGG SER-ACNC: 2 U/ML (ref 0–5)

## 2022-02-05 LAB
CODFISH IGE QN: 0.21 KU/L
CONV CLASS DESCRIPTION: ABNORMAL
COW MILK IGE QN: 0.39 KU/L
EGG WHITE IGE QN: 0.69 KU/L
GLUTEN IGE QN: 0.65 KU/L
HAZELNUT IGE QN: 0.29 KU/L
PEANUT IGE QN: 0.37 KU/L
SCALLOP IGE QN: 0.32 KU/L
SESAME SEED IGE QN: 0.24 KU/L
SHRIMP IGE QN: 0.42 KU/L
SOYBEAN IGE QN: 0.31 KU/L
WALNUT IGE QN: 0.25 KU/L
WHEAT IGE QN: 0.94 KU/L

## 2022-02-07 DIAGNOSIS — R89.9 ABNORMAL LABORATORY TEST RESULT: Primary | ICD-10-CM

## 2022-02-28 ENCOUNTER — ANESTHESIA EVENT (OUTPATIENT)
Dept: GASTROENTEROLOGY | Facility: HOSPITAL | Age: 27
End: 2022-02-28

## 2022-02-28 ENCOUNTER — ANESTHESIA (OUTPATIENT)
Dept: GASTROENTEROLOGY | Facility: HOSPITAL | Age: 27
End: 2022-02-28

## 2022-02-28 ENCOUNTER — HOSPITAL ENCOUNTER (OUTPATIENT)
Facility: HOSPITAL | Age: 27
Setting detail: HOSPITAL OUTPATIENT SURGERY
Discharge: HOME | End: 2022-02-28
Attending: INTERNAL MEDICINE | Admitting: INTERNAL MEDICINE

## 2022-02-28 VITALS
HEIGHT: 66 IN | BODY MASS INDEX: 19.13 KG/M2 | WEIGHT: 119 LBS | SYSTOLIC BLOOD PRESSURE: 104 MMHG | RESPIRATION RATE: 18 BRPM | OXYGEN SATURATION: 100 % | DIASTOLIC BLOOD PRESSURE: 61 MMHG | TEMPERATURE: 97 F | HEART RATE: 80 BPM

## 2022-02-28 DIAGNOSIS — R11.0 NAUSEA: ICD-10-CM

## 2022-02-28 DIAGNOSIS — R10.84 GENERALIZED ABDOMINAL PAIN: ICD-10-CM

## 2022-02-28 DIAGNOSIS — R19.7 DIARRHEA, UNSPECIFIED TYPE: ICD-10-CM

## 2022-02-28 PROCEDURE — 45380 COLONOSCOPY AND BIOPSY: CPT | Performed by: INTERNAL MEDICINE

## 2022-02-28 PROCEDURE — 25010000002 PROPOFOL 10 MG/ML EMULSION: Performed by: NURSE ANESTHETIST, CERTIFIED REGISTERED

## 2022-02-28 PROCEDURE — 43239 EGD BIOPSY SINGLE/MULTIPLE: CPT | Performed by: INTERNAL MEDICINE

## 2022-02-28 RX ORDER — DEXTROSE AND SODIUM CHLORIDE 5; .45 G/100ML; G/100ML
30 INJECTION, SOLUTION INTRAVENOUS CONTINUOUS PRN
Status: DISCONTINUED | OUTPATIENT
Start: 2022-02-28 | End: 2022-02-28 | Stop reason: HOSPADM

## 2022-02-28 RX ORDER — LIDOCAINE HYDROCHLORIDE 20 MG/ML
INJECTION, SOLUTION INTRAVENOUS AS NEEDED
Status: DISCONTINUED | OUTPATIENT
Start: 2022-02-28 | End: 2022-02-28 | Stop reason: SURG

## 2022-02-28 RX ORDER — PROPOFOL 10 MG/ML
VIAL (ML) INTRAVENOUS AS NEEDED
Status: DISCONTINUED | OUTPATIENT
Start: 2022-02-28 | End: 2022-02-28 | Stop reason: SURG

## 2022-02-28 RX ADMIN — PROPOFOL 100 MG: 10 INJECTION, EMULSION INTRAVENOUS at 13:17

## 2022-02-28 RX ADMIN — PROPOFOL 30 MG: 10 INJECTION, EMULSION INTRAVENOUS at 13:20

## 2022-02-28 RX ADMIN — DEXTROSE AND SODIUM CHLORIDE 30 ML/HR: 5; 450 INJECTION, SOLUTION INTRAVENOUS at 11:11

## 2022-02-28 RX ADMIN — LIDOCAINE HYDROCHLORIDE 50 MG: 20 INJECTION, SOLUTION INTRAVENOUS at 13:17

## 2022-02-28 RX ADMIN — PROPOFOL 30 MG: 10 INJECTION, EMULSION INTRAVENOUS at 13:29

## 2022-02-28 RX ADMIN — PROPOFOL 30 MG: 10 INJECTION, EMULSION INTRAVENOUS at 13:26

## 2022-02-28 RX ADMIN — PROPOFOL 30 MG: 10 INJECTION, EMULSION INTRAVENOUS at 13:23

## 2022-02-28 NOTE — ANESTHESIA PREPROCEDURE EVALUATION
Anesthesia Evaluation     Patient summary reviewed and Nursing notes reviewed   NPO Solid Status: > 8 hours  NPO Liquid Status: > 8 hours           Airway   Mallampati: II  TM distance: >3 FB  Neck ROM: full  No difficulty expected  Dental - normal exam     Pulmonary - negative pulmonary ROS and normal exam   Cardiovascular - negative cardio ROS and normal exam        Neuro/Psych  (+) psychiatric history Anxiety and ADHD,    GI/Hepatic/Renal/Endo    (+)   thyroid problem hypothyroidism    Musculoskeletal (-) negative ROS    Abdominal  - normal exam   Substance History - negative use     OB/GYN negative ob/gyn ROS         Other - negative ROS                       Anesthesia Plan    ASA 2     MAC     intravenous induction     Anesthetic plan, all risks, benefits, and alternatives have been provided, discussed and informed consent has been obtained with: patient.        CODE STATUS:

## 2022-02-28 NOTE — ANESTHESIA POSTPROCEDURE EVALUATION
Patient: Lala Padilla    Procedure Summary     Date: 02/28/22 Room / Location: [facility] ENDOSCOPY 1 / [facility] ENDOSCOPY    Anesthesia Start: 1315 Anesthesia Stop: 1331    Procedures:       ESOPHAGOGASTRODUODENOSCOPY (N/A )      COLONOSCOPY (N/A ) Diagnosis:       Generalized abdominal pain      Diarrhea, unspecified type      Nausea      (Generalized abdominal pain [R10.84])      (Diarrhea, unspecified type [R19.7])      (Nausea [R11.0])    Surgeons: Polo Gil MD Provider: Eddie Montalvo CRNA    Anesthesia Type: MAC ASA Status: 2          Anesthesia Type: MAC    Vitals  No vitals data found for the desired time range.          Post Anesthesia Care and Evaluation    Patient location during evaluation: bedside  Patient participation: complete - patient participated  Level of consciousness: awake and alert and sleepy but conscious  Pain score: 0  Pain management: adequate  Airway patency: patent  Anesthetic complications: No anesthetic complications  PONV Status: none  Cardiovascular status: acceptable  Respiratory status: acceptable  Hydration status: acceptable    Comments: ---------------------------               02/28/22                      1331         ---------------------------   BP:          72219         Pulse:         95           Resp:          18           Temp:   97.8 °F (36.6 °C)   SpO2:          99%         ---------------------------

## 2022-03-02 LAB
LAB AP CASE REPORT: NORMAL
PATH REPORT.FINAL DX SPEC: NORMAL

## 2022-03-07 ENCOUNTER — LAB (OUTPATIENT)
Dept: LAB | Facility: HOSPITAL | Age: 27
End: 2022-03-07

## 2022-03-07 ENCOUNTER — OFFICE VISIT (OUTPATIENT)
Dept: GASTROENTEROLOGY | Facility: CLINIC | Age: 27
End: 2022-03-07

## 2022-03-07 VITALS
DIASTOLIC BLOOD PRESSURE: 73 MMHG | HEIGHT: 66 IN | HEART RATE: 83 BPM | BODY MASS INDEX: 21.12 KG/M2 | WEIGHT: 131.4 LBS | SYSTOLIC BLOOD PRESSURE: 118 MMHG

## 2022-03-07 DIAGNOSIS — R19.7 DIARRHEA, UNSPECIFIED TYPE: ICD-10-CM

## 2022-03-07 DIAGNOSIS — R10.84 GENERALIZED ABDOMINAL PAIN: ICD-10-CM

## 2022-03-07 DIAGNOSIS — R19.7 DIARRHEA, UNSPECIFIED TYPE: Primary | ICD-10-CM

## 2022-03-07 DIAGNOSIS — K52.832 LYMPHOCYTIC COLITIS: ICD-10-CM

## 2022-03-07 PROCEDURE — 99214 OFFICE O/P EST MOD 30 MIN: CPT | Performed by: NURSE PRACTITIONER

## 2022-03-07 PROCEDURE — 36415 COLL VENOUS BLD VENIPUNCTURE: CPT

## 2022-03-07 RX ORDER — LEVOFLOXACIN 500 MG/1
500 TABLET, FILM COATED ORAL DAILY
Qty: 7 TABLET | Refills: 0 | Status: SHIPPED | OUTPATIENT
Start: 2022-03-07 | End: 2022-03-18

## 2022-03-07 RX ORDER — METRONIDAZOLE 500 MG/1
500 TABLET ORAL 2 TIMES DAILY
Qty: 20 TABLET | Refills: 0 | Status: SHIPPED | OUTPATIENT
Start: 2022-03-07 | End: 2022-03-29

## 2022-03-07 NOTE — PROGRESS NOTES
Chief Complaint   Patient presents with   • Diarrhea       Subjective    Lala Padilla is a 27 y.o. female. she is here today for follow-up.    History of Present Illness  27-year-old female presents for follow-up after EGD and colonoscopy done due to abdominal pain worsening diarrhea.  She reports frequent episodes of mucus stool has modified her diet generally avoids dairy, lettuce and known triggers but reports recently almost any intake is because trigger.  She was seen at [facility] had a CT which showed no acute abnormality in the GI tract and symptoms were attributed to ovarian cyst.  She has followed up with GYN who has ruled that out.  We tried her on dicyclomine which has not made much of an impact on her symptoms.  Recurrent GI distress panel was elevated with reactions noted to every trigger.  Referral placed to Dr. mckeon  EGD noted savary manzo 3 esophagitis gastritis and normal duodenum.  Antral biopsy noted reactive gastropathy.  Esophageal biopsy noted benign squamous mucosa.  Duodenal biopsy noted focal active enteritis negative for dysplasia.  Colonoscopy had adequate prep noted normal perianal digital rectal exam external and internal hemorrhoids were found.  Localized area of mucosa in the terminal ileum was mildly erythematous.  Biopsies were collected.  Terminal ileum biopsy noted focal active enteritis negative for dysplasia.  Colon biopsy noted lymphocytic colitis.       The following portions of the patient's history were reviewed and updated as appropriate:   Past Medical History:   Diagnosis Date   • Acute tonsillitis    • Bronchitis    • Cellulitis     History of abscess, near left axilla      • Contusion of knee    • Depressive disorder    • Diarrhea    • Disease of thyroid gland    • Dysmenorrhea 9/28/2017   • Dysuria    • Encounter for gynecological examination (general) (routine) without abnormal findings    • Encounter for removal of sutures    • Encounter for  surveillance of contraceptive pills    • Folliculitis    • Fracture of phalanx of hand     Left thumb      • Gastroenteritis    • Generalized anxiety disorder    • Hypothyroidism, unspecified    • Irregular periods    • Kidney stone    • Knee pain    • Lipoma    • Ovarian cyst    • Pain in joint, ankle and foot    • Pain in wrist    • Panic disorder    • Ruptured cyst of ovary    • Sports physical    • Sprain of ankle    • Strain of neck muscle    • Tick bite    • Upper respiratory infection      Past Surgical History:   Procedure Laterality Date   • ADENOIDECTOMY     • COLONOSCOPY N/A 2/28/2022    Procedure: COLONOSCOPY;  Surgeon: Polo Gil MD;  Location: [facility] ENDOSCOPY;  Service: Gastroenterology;  Laterality: N/A;   • ENDOSCOPY N/A 2/28/2022    Procedure: ESOPHAGOGASTRODUODENOSCOPY;  Surgeon: Polo Gil MD;  Location: [facility] ENDOSCOPY;  Service: Gastroenterology;  Laterality: N/A;   • INCISION AND DRAINAGE ABSCESS  02/04/2009    Left axillary abscess   • INJECTION OF MEDICATION  05/31/2014    Toradol(1)   • INJECTION OF MEDICATION  05/31/2014    Zofran(1)   • KIDNEY STONE SURGERY     • TONSILLECTOMY       Family History   Problem Relation Age of Onset   • Cancer Other    • Diabetes Other    • Heart disease Other    • Hypertension Other    • Thyroid disease Other      OB History    No obstetric history on file.       Prior to Admission medications    Medication Sig Start Date End Date Taking? Authorizing Provider   levothyroxine (Synthroid) 125 MCG tablet Take 1 tablet by mouth Daily. 1/12/22  Yes Paola Snyder APRN   zolpidem (AMBIEN) 10 MG tablet Take 5-10 mg by mouth At Night As Needed. 12/27/21  Yes Provider, MD Kole   dicyclomine (Bentyl) 10 MG capsule Take 1 capsule by mouth 2 (Two) Times a Day As Needed (bid prn). 1/10/22   Paola Snyder APRN   levoFLOXacin (Levaquin) 500 MG tablet Take 1 tablet by mouth Daily. 3/7/22   Carmen Deleon APRN   metroNIDAZOLE (FLAGYL) 500  "MG tablet Take 1 tablet by mouth 2 (Two) Times a Day. 3/7/22   Carmen Deleon APRN   sodium-potassium-magnesium sulfates (Suprep Bowel Prep Kit) 17.5-3.13-1.6 GM/177ML solution oral solution Take 1 bottle by mouth Every 12 (Twelve) Hours. 2/1/22 3/7/22  Carmen Deleon APRN     No Known Allergies  Social History     Socioeconomic History   • Marital status: Single   Tobacco Use   • Smoking status: Never Smoker   • Smokeless tobacco: Never Used   Substance and Sexual Activity   • Alcohol use: No   • Drug use: No   • Sexual activity: Not Currently     Birth control/protection: None       Review of Systems  Review of Systems   Constitutional: Positive for fatigue. Negative for activity change, appetite change, chills, diaphoresis, fever and unexpected weight change.   HENT: Negative for sore throat and trouble swallowing.    Respiratory: Negative for shortness of breath.    Gastrointestinal: Positive for abdominal distention, abdominal pain, diarrhea (about 2 per day ) and nausea. Negative for anal bleeding, blood in stool, constipation, rectal pain and vomiting.   Musculoskeletal: Negative for arthralgias.   Skin: Negative for pallor.   Neurological: Negative for light-headedness.        /73 (BP Location: Left arm)   Pulse 83   Ht 167.6 cm (66\")   Wt 59.6 kg (131 lb 6.4 oz)   LMP 02/06/2022   BMI 21.21 kg/m²     Objective    Physical Exam  Constitutional:       General: She is not in acute distress.     Appearance: Normal appearance. She is normal weight. She is not ill-appearing.   HENT:      Head: Normocephalic and atraumatic.   Pulmonary:      Effort: Pulmonary effort is normal.   Abdominal:      General: Abdomen is flat. Bowel sounds are normal. There is no distension.      Palpations: Abdomen is soft. There is no mass.      Tenderness: There is generalized abdominal tenderness.   Neurological:      Mental Status: She is alert.       Admission on 02/28/2022, Discharged on 02/28/2022   Component Date Value " Ref Range Status   • Case Report 02/28/2022    Final                    Value:Surgical Pathology Report                         Case: [id number]                                  Authorizing Provider:  Polo Gil MD        Collected:           02/28/2022 01:22 PM          Ordering Location:     [facility]   Received:            02/28/2022 01:52 PM                                 [facility]                                                     Pathologist:           Blade Jay MD                                                           Specimens:   1) - Small Intestine, Duodenum, JA                                                                  2) - Gastric, Antrum, JA                                                                            3) - Esophagus, Distal, JA                                                                          4) - Small Intestine, Ileum, TI   JA                                                                5) - Large Intestine, colonic mucosa   JA                                                 • Final Diagnosis 02/28/2022    Final                    Value:This result contains rich text formatting which cannot be displayed here.     Assessment/Plan      1. Diarrhea, unspecified type    2. Generalized abdominal pain    3. Lymphocytic colitis    .   Discussed concern for Crohn disease with patient will treat with broad-spectrum antibiotics due to concern for bacterial enteritis.  Will obtain IBD panel and repeat CT due to persistent pain we will treat enteritis with broad-spectrum antibiotic and lymphocytic colitis with Entocort.  Follow-up after test return office sooner if needed    Orders placed during this encounter include:  Orders Placed This Encounter   Procedures   • CT Abdomen Pelvis With Contrast     Standing Status:   Future     Standing Expiration Date:   3/7/2023     Order Specific Question:   Will Oral Contrast be needed for this  procedure?     Answer:   Yes     Order Specific Question:   Patient Pregnant     Answer:   No   • IBD Sgi Diagnostic     Standing Status:   Future     Number of Occurrences:   1     Standing Expiration Date:   3/7/2023     Order Specific Question:   Release to patient     Answer:   Immediate       * Surgery not found *    Review and/or summary of lab tests, radiology, procedures, medications. Review and summary of old records and obtaining of history. The risks and benefits of my recommendations, as well as other treatment options were discussed with the patient today. Questions were answered.    New Medications Ordered This Visit   Medications   • metroNIDAZOLE (FLAGYL) 500 MG tablet     Sig: Take 1 tablet by mouth 2 (Two) Times a Day.     Dispense:  20 tablet     Refill:  0   • Budesonide (ENTOCORT EC) 3 MG 24 hr capsule     Sig: 3 tablets by mouth daily for 6 weeks, then 2 tablets by mouth daily for 2 weeks     Dispense:  154 capsule     Refill:  0       Follow-up: Return in about 4 weeks (around 4/4/2022) for Recheck.          This document has been electronically signed by MILLY Torres on March 18, 2022 11:03 CDT           I spent 25 minutes caring for Lala on this date of service. This time includes time spent by me in the following activities:preparing for the visit, reviewing tests, obtaining and/or reviewing a separately obtained history, performing a medically appropriate examination and/or evaluation , counseling and educating the patient/family/caregiver, ordering medications, tests, or procedures, referring and communicating with other health care professionals , documenting information in the medical record and care coordination    Results for orders placed or performed during the hospital encounter of 02/28/22   Tissue Pathology Exam    Specimen: A: Small Intestine, Duodenum; Tissue    B: Gastric, Antrum; Tissue    C: Esophagus, Distal; Tissue    D: Small Intestine, Ileum; Tissue    E: Large  Intestine; Tissue   Result Value Ref Range    Case Report       Surgical Pathology Report                         Case: [id number]                                  Authorizing Provider:  Polo Gil MD        Collected:           02/28/2022 01:22 PM          Ordering Location:     [facility]   Received:            02/28/2022 01:52 PM                                 [place] [facility]                                                     Pathologist:           Blade Jay MD                                                           Specimens:   1) - Small Intestine, Duodenum, JA                                                                  2) - Gastric, Antrum, JA                                                                            3) - Esophagus, Distal, JA                                                                          4) - Small Intestine, Ileum, TI   JA                                                                5) - Large Intestine, colonic mucosa   JA                                                  Final Diagnosis       See Scanned Report       Results for orders placed or performed in visit on 02/01/22   Glia(IgA / G) & TTG(IgA / G)    Specimen: Blood   Result Value Ref Range    Gliadin Deamidated Peptide Ab, IgA 3 0 - 19 units    Deaminated Gliadin Ab IgG 5 0 - 19 units    Tissue Transglutaminase IgA <2 0 - 3 U/mL    Tissue Transglutaminase IgG 2 0 - 5 U/mL   Allergens (12) Foods    Specimen: Blood   Result Value Ref Range    Class Description Comment     Egg White 0.69 (A) Class II kU/L    Milk, Cow's 0.39 (A) Class I kU/L    CodFish 0.21 (A) Class 0/I kU/L    Sesame Seed 0.24 (A) Class 0/I kU/L    Peanut 0.37 (A) Class I kU/L    Soybean 0.31 (A) Class 0/I kU/L    Hazelnut 0.29 (A) Class 0/I kU/L    Shrimp 0.42 (A) Class I kU/L    Scallop 0.32 (A) Class I kU/L    Gluten 0.65 (A) Class II kU/L    [place] 0.25 (A) Class 0/I kU/L    Wheat 0.94 (A) Class II kU/L    Results for orders placed or performed in visit on 01/10/22   CBC Auto Differential    Specimen: Blood   Result Value Ref Range    WBC 8.21 3.40 - 10.80 10*3/mm3    RBC 4.76 3.77 - 5.28 10*6/mm3    Hemoglobin 13.6 12.0 - 15.9 g/dL    Hematocrit 39.3 34.0 - 46.6 %    MCV 82.6 79.0 - 97.0 fL    MCH 28.6 26.6 - 33.0 pg    MCHC 34.6 31.5 - 35.7 g/dL    RDW 13.7 12.3 - 15.4 %    RDW-SD 41.0 37.0 - 54.0 fl    MPV 9.5 6.0 - 12.0 fL    Platelets 268 140 - 450 10*3/mm3    Neutrophil % 73.6 42.7 - 76.0 %    Lymphocyte % 16.8 (L) 19.6 - 45.3 %    Monocyte % 7.3 5.0 - 12.0 %    Eosinophil % 1.5 0.3 - 6.2 %    Basophil % 0.4 0.0 - 1.5 %    Immature Grans % 0.4 0.0 - 0.5 %    Neutrophils, Absolute 6.05 1.70 - 7.00 10*3/mm3    Lymphocytes, Absolute 1.38 0.70 - 3.10 10*3/mm3    Monocytes, Absolute 0.60 0.10 - 0.90 10*3/mm3    Eosinophils, Absolute 0.12 0.00 - 0.40 10*3/mm3    Basophils, Absolute 0.03 0.00 - 0.20 10*3/mm3    Immature Grans, Absolute 0.03 0.00 - 0.05 10*3/mm3    nRBC 0.0 0.0 - 0.2 /100 WBC   Vitamin D 25 Hydroxy    Specimen: Blood   Result Value Ref Range    25 Hydroxy, Vitamin D 26.9 ng/ml   T3    Specimen: Blood   Result Value Ref Range    T3, Total 112.0 80.0 - 200.0 ng/dl   TSH    Specimen: Blood   Result Value Ref Range    TSH 6.710 (H) 0.270 - 4.200 uIU/mL   T4, Free    Specimen: Blood   Result Value Ref Range    Free T4 1.39 0.93 - 1.70 ng/dL   Iron    Specimen: Blood   Result Value Ref Range    Iron 36 (L) 37 - 145 mcg/dL   Vitamin B12    Specimen: Blood   Result Value Ref Range    Vitamin B-12 486 211 - 946 pg/mL   Comprehensive Metabolic Panel    Specimen: Blood   Result Value Ref Range    Glucose 82 65 - 99 mg/dL    BUN 14 6 - 20 mg/dL    Creatinine 0.78 0.57 - 1.00 mg/dL    Sodium 138 136 - 145 mmol/L    Potassium 4.4 3.5 - 5.2 mmol/L    Chloride 101 98 - 107 mmol/L    CO2 27.0 22.0 - 29.0 mmol/L    Calcium 9.8 8.6 - 10.5 mg/dL    Total Protein 6.5 6.0 - 8.5 g/dL    Albumin 4.50 3.50 - 5.20 g/dL    ALT  (SGPT) 15 1 - 33 U/L    AST (SGOT) 19 1 - 32 U/L    Alkaline Phosphatase 77 39 - 117 U/L    Total Bilirubin 0.3 0.0 - 1.2 mg/dL    eGFR Non African Amer 88 >60 mL/min/1.73    Globulin 2.0 gm/dL    A/G Ratio 2.3 g/dL    BUN/Creatinine Ratio 17.9 7.0 - 25.0    Anion Gap 10.0 5.0 - 15.0 mmol/L   Results for orders placed or performed in visit on 12/02/21   CBC Auto Differential    Specimen: Blood   Result Value Ref Range    WBC 7.27 3.40 - 10.80 10*3/mm3    RBC 4.86 3.77 - 5.28 10*6/mm3    Hemoglobin 13.9 12.0 - 15.9 g/dL    Hematocrit 41.3 34.0 - 46.6 %    MCV 85.0 79.0 - 97.0 fL    MCH 28.6 26.6 - 33.0 pg    MCHC 33.7 31.5 - 35.7 g/dL    RDW 13.6 12.3 - 15.4 %    RDW-SD 42.0 37.0 - 54.0 fl    MPV 8.8 6.0 - 12.0 fL    Platelets 250 140 - 450 10*3/mm3    Neutrophil % 71.6 42.7 - 76.0 %    Lymphocyte % 16.6 (L) 19.6 - 45.3 %    Monocyte % 7.4 5.0 - 12.0 %    Eosinophil % 3.4 0.3 - 6.2 %    Basophil % 0.7 0.0 - 1.5 %    Immature Grans % 0.3 0.0 - 0.5 %    Neutrophils, Absolute 5.20 1.70 - 7.00 10*3/mm3    Lymphocytes, Absolute 1.21 0.70 - 3.10 10*3/mm3    Monocytes, Absolute 0.54 0.10 - 0.90 10*3/mm3    Eosinophils, Absolute 0.25 0.00 - 0.40 10*3/mm3    Basophils, Absolute 0.05 0.00 - 0.20 10*3/mm3    Immature Grans, Absolute 0.02 0.00 - 0.05 10*3/mm3    nRBC 0.0 0.0 - 0.2 /100 WBC   Vitamin D 25 Hydroxy    Specimen: Blood   Result Value Ref Range    25 Hydroxy, Vitamin D 28.5 (L) 30.0 - 100.0 ng/ml   TSH    Specimen: Blood   Result Value Ref Range    TSH 1.580 0.270 - 4.200 uIU/mL   Comprehensive Metabolic Panel    Specimen: Blood   Result Value Ref Range    Glucose 88 65 - 99 mg/dL    BUN 13 6 - 20 mg/dL    Creatinine 1.02 (H) 0.57 - 1.00 mg/dL    Sodium 140 136 - 145 mmol/L    Potassium 4.4 3.5 - 5.2 mmol/L    Chloride 104 98 - 107 mmol/L    CO2 29.0 22.0 - 29.0 mmol/L    Calcium 9.4 8.6 - 10.5 mg/dL    Total Protein 7.4 6.0 - 8.5 g/dL    Albumin 4.60 3.50 - 5.20 g/dL    ALT (SGPT) 15 1 - 33 U/L    AST (SGOT) 21 1 -  32 U/L    Alkaline Phosphatase 80 39 - 117 U/L    Total Bilirubin 0.4 0.0 - 1.2 mg/dL    eGFR Non African Amer 65 >60 mL/min/1.73    Globulin 2.8 gm/dL    A/G Ratio 1.6 g/dL    BUN/Creatinine Ratio 12.7 7.0 - 25.0    Anion Gap 7.0 5.0 - 15.0 mmol/L     *Note: Due to a large number of results and/or encounters for the requested time period, some results have not been displayed. A complete set of results can be found in Results Review.

## 2022-03-16 ENCOUNTER — TRANSCRIBE ORDERS (OUTPATIENT)
Dept: GENERAL RADIOLOGY | Facility: HOSPITAL | Age: 27
End: 2022-03-16

## 2022-03-16 DIAGNOSIS — R19.7 DIARRHEA, UNSPECIFIED TYPE: Primary | ICD-10-CM

## 2022-03-18 LAB — REF LAB TEST METHOD: NORMAL

## 2022-03-18 RX ORDER — BUDESONIDE 3 MG/1
CAPSULE, COATED PELLETS ORAL
Qty: 154 CAPSULE | Refills: 0 | Status: SHIPPED | OUTPATIENT
Start: 2022-03-18 | End: 2022-06-22

## 2022-03-21 ENCOUNTER — APPOINTMENT (OUTPATIENT)
Dept: CT IMAGING | Facility: HOSPITAL | Age: 27
End: 2022-03-21

## 2022-03-21 ENCOUNTER — HOSPITAL ENCOUNTER (OUTPATIENT)
Dept: CT IMAGING | Facility: HOSPITAL | Age: 27
Discharge: HOME | End: 2022-03-21

## 2022-03-21 ENCOUNTER — LAB (OUTPATIENT)
Dept: LAB | Facility: HOSPITAL | Age: 27
End: 2022-03-21

## 2022-03-21 DIAGNOSIS — R19.7 DIARRHEA, UNSPECIFIED TYPE: ICD-10-CM

## 2022-03-21 DIAGNOSIS — R10.84 GENERALIZED ABDOMINAL PAIN: ICD-10-CM

## 2022-03-21 DIAGNOSIS — Z01.812 PRE-OPERATIVE LABORATORY EXAMINATION: Primary | ICD-10-CM

## 2022-03-21 DIAGNOSIS — R19.7 DIARRHEA, UNSPECIFIED TYPE: Primary | ICD-10-CM

## 2022-03-21 LAB — B-HCG UR QL: NEGATIVE

## 2022-03-21 PROCEDURE — 81025 URINE PREGNANCY TEST: CPT

## 2022-03-21 PROCEDURE — 74160 CT ABDOMEN W/CONTRAST: CPT

## 2022-03-21 PROCEDURE — 25010000002 IOPAMIDOL 61 % SOLUTION: Performed by: NURSE PRACTITIONER

## 2022-03-21 RX ADMIN — IOPAMIDOL 90 ML: 612 INJECTION, SOLUTION INTRAVENOUS at 16:00

## 2022-03-29 ENCOUNTER — OFFICE VISIT (OUTPATIENT)
Dept: GASTROENTEROLOGY | Facility: CLINIC | Age: 27
End: 2022-03-29

## 2022-03-29 VITALS
HEART RATE: 78 BPM | HEIGHT: 66 IN | BODY MASS INDEX: 20.54 KG/M2 | DIASTOLIC BLOOD PRESSURE: 74 MMHG | SYSTOLIC BLOOD PRESSURE: 111 MMHG | WEIGHT: 127.8 LBS

## 2022-03-29 DIAGNOSIS — K52.832 LYMPHOCYTIC COLITIS: Primary | ICD-10-CM

## 2022-03-29 PROCEDURE — 99214 OFFICE O/P EST MOD 30 MIN: CPT | Performed by: NURSE PRACTITIONER

## 2022-03-29 RX ORDER — EPINEPHRINE 0.3 MG/.3ML
INJECTION SUBCUTANEOUS
COMMUNITY
Start: 2022-03-09

## 2022-03-29 RX ORDER — PEG-3350, SODIUM SULFATE, SODIUM CHLORIDE, POTASSIUM CHLORIDE, SODIUM ASCORBATE AND ASCORBIC ACID 7.5-2.691G
1000 KIT ORAL EVERY 12 HOURS
Qty: 1000 ML | Refills: 0 | Status: SHIPPED | OUTPATIENT
Start: 2022-03-29 | End: 2022-07-05

## 2022-03-29 RX ORDER — DEXTROSE AND SODIUM CHLORIDE 5; .45 G/100ML; G/100ML
30 INJECTION, SOLUTION INTRAVENOUS CONTINUOUS PRN
Status: CANCELLED | OUTPATIENT
Start: 2022-06-24

## 2022-06-02 ENCOUNTER — OFFICE VISIT (OUTPATIENT)
Dept: ENDOCRINOLOGY | Facility: CLINIC | Age: 27
End: 2022-06-02

## 2022-06-02 ENCOUNTER — LAB (OUTPATIENT)
Dept: LAB | Facility: HOSPITAL | Age: 27
End: 2022-06-02

## 2022-06-02 VITALS
WEIGHT: 137.7 LBS | DIASTOLIC BLOOD PRESSURE: 72 MMHG | HEART RATE: 111 BPM | SYSTOLIC BLOOD PRESSURE: 116 MMHG | HEIGHT: 67 IN | OXYGEN SATURATION: 100 % | BODY MASS INDEX: 21.61 KG/M2

## 2022-06-02 DIAGNOSIS — E55.9 VITAMIN D DEFICIENCY: ICD-10-CM

## 2022-06-02 DIAGNOSIS — E06.3 HYPOTHYROIDISM DUE TO HASHIMOTO'S THYROIDITIS: Primary | ICD-10-CM

## 2022-06-02 DIAGNOSIS — E03.8 HYPOTHYROIDISM DUE TO HASHIMOTO'S THYROIDITIS: Primary | ICD-10-CM

## 2022-06-02 LAB
25(OH)D3 SERPL-MCNC: 24.7 NG/ML (ref 30–100)
ALBUMIN SERPL-MCNC: 4.4 G/DL (ref 3.5–5.2)
ALBUMIN/GLOB SERPL: 1.5 G/DL
ALP SERPL-CCNC: 89 U/L (ref 39–117)
ALT SERPL W P-5'-P-CCNC: 22 U/L (ref 1–33)
ANION GAP SERPL CALCULATED.3IONS-SCNC: 8 MMOL/L (ref 5–15)
AST SERPL-CCNC: 27 U/L (ref 1–32)
BASOPHILS # BLD AUTO: 0.04 10*3/MM3 (ref 0–0.2)
BASOPHILS NFR BLD AUTO: 0.5 % (ref 0–1.5)
BILIRUB SERPL-MCNC: 0.2 MG/DL (ref 0–1.2)
BUN SERPL-MCNC: 10 MG/DL (ref 6–20)
BUN/CREAT SERPL: 10.4 (ref 7–25)
CALCIUM SPEC-SCNC: 10.3 MG/DL (ref 8.6–10.5)
CHLORIDE SERPL-SCNC: 105 MMOL/L (ref 98–107)
CO2 SERPL-SCNC: 28 MMOL/L (ref 22–29)
CREAT SERPL-MCNC: 0.96 MG/DL (ref 0.57–1)
DEPRECATED RDW RBC AUTO: 41.4 FL (ref 37–54)
EGFRCR SERPLBLD CKD-EPI 2021: 82.8 ML/MIN/1.73
EOSINOPHIL # BLD AUTO: 0.35 10*3/MM3 (ref 0–0.4)
EOSINOPHIL NFR BLD AUTO: 4.2 % (ref 0.3–6.2)
ERYTHROCYTE [DISTWIDTH] IN BLOOD BY AUTOMATED COUNT: 13.7 % (ref 12.3–15.4)
GLOBULIN UR ELPH-MCNC: 2.9 GM/DL
GLUCOSE SERPL-MCNC: 89 MG/DL (ref 65–99)
HCT VFR BLD AUTO: 41 % (ref 34–46.6)
HGB BLD-MCNC: 14 G/DL (ref 12–15.9)
IMM GRANULOCYTES # BLD AUTO: 0.06 10*3/MM3 (ref 0–0.05)
IMM GRANULOCYTES NFR BLD AUTO: 0.7 % (ref 0–0.5)
LYMPHOCYTES # BLD AUTO: 1 10*3/MM3 (ref 0.7–3.1)
LYMPHOCYTES NFR BLD AUTO: 12.1 % (ref 19.6–45.3)
MCH RBC QN AUTO: 28.4 PG (ref 26.6–33)
MCHC RBC AUTO-ENTMCNC: 34.1 G/DL (ref 31.5–35.7)
MCV RBC AUTO: 83.2 FL (ref 79–97)
MONOCYTES # BLD AUTO: 0.66 10*3/MM3 (ref 0.1–0.9)
MONOCYTES NFR BLD AUTO: 8 % (ref 5–12)
NEUTROPHILS NFR BLD AUTO: 6.13 10*3/MM3 (ref 1.7–7)
NEUTROPHILS NFR BLD AUTO: 74.5 % (ref 42.7–76)
NRBC BLD AUTO-RTO: 0 /100 WBC (ref 0–0.2)
PLATELET # BLD AUTO: 244 10*3/MM3 (ref 140–450)
PMV BLD AUTO: 8.7 FL (ref 6–12)
POTASSIUM SERPL-SCNC: 4.6 MMOL/L (ref 3.5–5.2)
PROT SERPL-MCNC: 7.3 G/DL (ref 6–8.5)
RBC # BLD AUTO: 4.93 10*6/MM3 (ref 3.77–5.28)
SODIUM SERPL-SCNC: 141 MMOL/L (ref 136–145)
TSH SERPL DL<=0.05 MIU/L-ACNC: 0.17 UIU/ML (ref 0.27–4.2)
WBC NRBC COR # BLD: 8.24 10*3/MM3 (ref 3.4–10.8)

## 2022-06-02 PROCEDURE — 80050 GENERAL HEALTH PANEL: CPT | Performed by: NURSE PRACTITIONER

## 2022-06-02 PROCEDURE — 36415 COLL VENOUS BLD VENIPUNCTURE: CPT | Performed by: NURSE PRACTITIONER

## 2022-06-02 PROCEDURE — 82306 VITAMIN D 25 HYDROXY: CPT | Performed by: NURSE PRACTITIONER

## 2022-06-02 PROCEDURE — 99214 OFFICE O/P EST MOD 30 MIN: CPT | Performed by: NURSE PRACTITIONER

## 2022-06-02 RX ORDER — LAMOTRIGINE 100 MG/1
100 TABLET ORAL 2 TIMES DAILY
COMMUNITY
Start: 2022-05-19

## 2022-06-02 NOTE — PROGRESS NOTES
"Chief Complaint  Thyroid Problem    Subjective          Lala Padilla presents to [facility] ENDOCRINOLOGY  History of Present Illness       In office visit       27 year old female presents for follow up     Reason hypothyroidism      duration since 2015         Timing constant         Quality controlled         Severity - mild     Context - diagnosed with routine lab work      Location/size - thyroid      THYROID ULTRASOUND     HISTORY: Hypothyroidism. Acquired hypothyroidism.     The thyroid was scanned with a small parts transducer.     COMPARISON: None     FINDINGS:  The right lobe measures 4.23 cm in length by 1.13 cm AP by 1.23  cm transverse.  The left lobe measures 4.30 cm in length by 1.28 cm AP by 1.14 cm  transverse.  Unremarkable isthmus.     Thyroid of inhomogeneous echotexture.  No focal nodule or mass.     IMPRESSION:  CONCLUSION:  Heterogeneous thyroid which could represent thyroiditis.  No focal nodule or mass.     [id number]     Electronically signed by:  Jm Lee MD  7/26/2019 1:35 PM CDT  Workstation: [id number]            Alleviating Factors -compliance with  levothyroxine 125 mcg one daily            She takes the medication alone with no other medications or food          Review of Systems - General ROS: negative        Objective   Vital Signs:   /72   Pulse 111   Ht 170.2 cm (67\")   Wt 62.5 kg (137 lb 11.2 oz)   SpO2 100%   BMI 21.57 kg/m²     Physical Exam  Constitutional:       Appearance: Normal appearance.   Cardiovascular:      Rate and Rhythm: Regular rhythm.      Heart sounds: Normal heart sounds.   Pulmonary:      Breath sounds: Normal breath sounds.   Musculoskeletal:         General: Normal range of motion.      Cervical back: Normal range of motion.   Neurological:      Mental Status: She is alert.        Result Review :   The following data was reviewed by: MILLY Alcala on 06/02/2022:  Common labs    Common Labsle " 10/12/21 12/2/21 12/2/21 1/10/22 1/10/22     [id number]   Glucose 71  88  82   BUN 10  13  14   Creatinine 0.86  1.02 (A)  0.78   eGFR Non African Am 79  65  88   Sodium 139  140  138   Potassium 4.0  4.4  4.4   Chloride 102  104  101   Calcium 9.5  9.4  9.8   Albumin 4.60  4.60  4.50   Total Bilirubin   0.4  0.3   Alkaline Phosphatase   80  77   AST (SGOT)   21  19   ALT (SGPT)   15  15   WBC  7.27  8.21    Hemoglobin  13.9  13.6    Hematocrit  41.3  39.3    Platelets  250  268    (A) Abnormal value                      Assessment and Plan    Diagnoses and all orders for this visit:    1. Hypothyroidism due to Hashimoto's thyroiditis (Primary)  -     CBC & Differential  -     Comprehensive Metabolic Panel  -     TSH  -     Vitamin D 25 Hydroxy    2. Vitamin D deficiency  -     CBC & Differential  -     Comprehensive Metabolic Panel  -     TSH  -     Vitamin D 25 Hydroxy             Hypothyroidism due to hashimoto's               Taking Levothyroxine 125 mcg daily              Lab Results   Component Value Date    TSH 6.710 (H) 01/10/2022             Bone Health     Vitamin d def..        Taking MVI daily with vitamin d         Component      Latest Ref Rng & Units 4/27/2021   25 Hydroxy, Vitamin D      ng/ml 31.5                          Follow Up   Return in about 6 months (around 12/2/2022) for Recheck.  Patient was given instructions and counseling regarding her condition or for health maintenance advice. Please see specific information pulled into the AVS if appropriate.         This document has been electronically signed by MILLY Alcala on June 2, 2022 08:47 CDT.

## 2022-06-22 RX ORDER — METHYLPHENIDATE HYDROCHLORIDE 40 MG/1
40 CAPSULE ORAL NIGHTLY
COMMUNITY
End: 2022-09-06

## 2022-06-24 ENCOUNTER — HOSPITAL ENCOUNTER (OUTPATIENT)
Facility: HOSPITAL | Age: 27
Setting detail: HOSPITAL OUTPATIENT SURGERY
Discharge: HOME | End: 2022-06-24
Attending: INTERNAL MEDICINE | Admitting: INTERNAL MEDICINE

## 2022-06-24 ENCOUNTER — ANESTHESIA (OUTPATIENT)
Dept: GASTROENTEROLOGY | Facility: HOSPITAL | Age: 27
End: 2022-06-24

## 2022-06-24 ENCOUNTER — ANESTHESIA EVENT (OUTPATIENT)
Dept: GASTROENTEROLOGY | Facility: HOSPITAL | Age: 27
End: 2022-06-24

## 2022-06-24 VITALS
DIASTOLIC BLOOD PRESSURE: 63 MMHG | HEIGHT: 67 IN | TEMPERATURE: 97.2 F | HEART RATE: 87 BPM | BODY MASS INDEX: 20.88 KG/M2 | RESPIRATION RATE: 18 BRPM | WEIGHT: 133 LBS | SYSTOLIC BLOOD PRESSURE: 104 MMHG | OXYGEN SATURATION: 97 %

## 2022-06-24 DIAGNOSIS — K52.832 LYMPHOCYTIC COLITIS: ICD-10-CM

## 2022-06-24 PROCEDURE — 25010000002 FENTANYL CITRATE (PF) 50 MCG/ML SOLUTION: Performed by: NURSE ANESTHETIST, CERTIFIED REGISTERED

## 2022-06-24 PROCEDURE — 25010000002 PROPOFOL 10 MG/ML EMULSION: Performed by: NURSE ANESTHETIST, CERTIFIED REGISTERED

## 2022-06-24 PROCEDURE — 25010000002 MIDAZOLAM PER 1 MG: Performed by: NURSE ANESTHETIST, CERTIFIED REGISTERED

## 2022-06-24 PROCEDURE — 45380 COLONOSCOPY AND BIOPSY: CPT | Performed by: INTERNAL MEDICINE

## 2022-06-24 RX ORDER — PROMETHAZINE HYDROCHLORIDE 25 MG/1
25 TABLET ORAL ONCE AS NEEDED
Status: DISCONTINUED | OUTPATIENT
Start: 2022-06-24 | End: 2022-06-24 | Stop reason: HOSPADM

## 2022-06-24 RX ORDER — PROPOFOL 10 MG/ML
VIAL (ML) INTRAVENOUS AS NEEDED
Status: DISCONTINUED | OUTPATIENT
Start: 2022-06-24 | End: 2022-06-24 | Stop reason: SURG

## 2022-06-24 RX ORDER — DEXTROSE AND SODIUM CHLORIDE 5; .45 G/100ML; G/100ML
30 INJECTION, SOLUTION INTRAVENOUS CONTINUOUS PRN
Status: DISCONTINUED | OUTPATIENT
Start: 2022-06-24 | End: 2022-06-24 | Stop reason: HOSPADM

## 2022-06-24 RX ORDER — MIDAZOLAM HYDROCHLORIDE 1 MG/ML
INJECTION INTRAMUSCULAR; INTRAVENOUS AS NEEDED
Status: DISCONTINUED | OUTPATIENT
Start: 2022-06-24 | End: 2022-06-24 | Stop reason: SURG

## 2022-06-24 RX ORDER — MEPERIDINE HYDROCHLORIDE 25 MG/ML
12.5 INJECTION INTRAMUSCULAR; INTRAVENOUS; SUBCUTANEOUS
Status: DISCONTINUED | OUTPATIENT
Start: 2022-06-24 | End: 2022-06-24 | Stop reason: HOSPADM

## 2022-06-24 RX ORDER — PROMETHAZINE HYDROCHLORIDE 25 MG/1
25 SUPPOSITORY RECTAL ONCE AS NEEDED
Status: DISCONTINUED | OUTPATIENT
Start: 2022-06-24 | End: 2022-06-24 | Stop reason: HOSPADM

## 2022-06-24 RX ORDER — ONDANSETRON 2 MG/ML
4 INJECTION INTRAMUSCULAR; INTRAVENOUS ONCE AS NEEDED
Status: DISCONTINUED | OUTPATIENT
Start: 2022-06-24 | End: 2022-06-24 | Stop reason: HOSPADM

## 2022-06-24 RX ORDER — FENTANYL CITRATE 50 UG/ML
INJECTION, SOLUTION INTRAMUSCULAR; INTRAVENOUS AS NEEDED
Status: DISCONTINUED | OUTPATIENT
Start: 2022-06-24 | End: 2022-06-24 | Stop reason: SURG

## 2022-06-24 RX ADMIN — FENTANYL CITRATE 100 MCG: 50 INJECTION INTRAMUSCULAR; INTRAVENOUS at 10:35

## 2022-06-24 RX ADMIN — PROPOFOL 30 MG: 10 INJECTION, EMULSION INTRAVENOUS at 10:40

## 2022-06-24 RX ADMIN — MIDAZOLAM HYDROCHLORIDE 2 MG: 1 INJECTION, SOLUTION INTRAMUSCULAR; INTRAVENOUS at 10:35

## 2022-06-24 RX ADMIN — DEXTROSE AND SODIUM CHLORIDE 30 ML/HR: 5; 450 INJECTION, SOLUTION INTRAVENOUS at 09:19

## 2022-06-24 RX ADMIN — PROPOFOL 30 MG: 10 INJECTION, EMULSION INTRAVENOUS at 10:44

## 2022-06-24 RX ADMIN — PROPOFOL 70 MG: 10 INJECTION, EMULSION INTRAVENOUS at 10:35

## 2022-06-24 NOTE — ANESTHESIA PREPROCEDURE EVALUATION
Anesthesia Evaluation     Patient summary reviewed and Nursing notes reviewed   NPO Solid Status: > 8 hours  NPO Liquid Status: > 8 hours           Airway   Mallampati: II  TM distance: >3 FB  Neck ROM: full  No difficulty expected  Dental - normal exam     Pulmonary - negative pulmonary ROS and normal exam   Cardiovascular - negative cardio ROS and normal exam        Neuro/Psych  (+) psychiatric history Anxiety and ADHD,    GI/Hepatic/Renal/Endo    (+)   thyroid problem hypothyroidism    Musculoskeletal (-) negative ROS    Abdominal  - normal exam   Substance History - negative use     OB/GYN negative ob/gyn ROS         Other - negative ROS                         Anesthesia Plan    ASA 2     MAC     intravenous induction     Anesthetic plan, risks, benefits, and alternatives have been provided, discussed and informed consent has been obtained with: patient.        CODE STATUS:

## 2022-06-24 NOTE — ANESTHESIA POSTPROCEDURE EVALUATION
Patient: Lala Padilla    Procedure Summary     Date: 06/24/22 Room / Location: [facility] ENDOSCOPY 1 / [facility] ENDOSCOPY    Anesthesia Start: 1035 Anesthesia Stop: 1050    Procedure: COLONOSCOPY (N/A ) Diagnosis:       Lymphocytic colitis      (Lymphocytic colitis [K52.832])    Surgeons: Polo Gil MD Provider: Olesya Jang CRNA    Anesthesia Type: MAC ASA Status: 2          Anesthesia Type: MAC    Vitals  No vitals data found for the desired time range.          Post Anesthesia Care and Evaluation    Patient location during evaluation: bedside  Patient participation: waiting for patient participation  Level of consciousness: sleepy but conscious  Pain score: 0  Pain management: adequate    Airway patency: patent  Anesthetic complications: No anesthetic complications  PONV Status: none  Cardiovascular status: acceptable  Respiratory status: acceptable  Hydration status: acceptable    Comments: Hr 70, 91/51, rr 14, 100%

## 2022-06-30 LAB — REF LAB TEST METHOD: NORMAL

## 2022-07-05 ENCOUNTER — OFFICE VISIT (OUTPATIENT)
Dept: GASTROENTEROLOGY | Facility: CLINIC | Age: 27
End: 2022-07-05

## 2022-07-05 VITALS
WEIGHT: 140.2 LBS | BODY MASS INDEX: 22 KG/M2 | HEART RATE: 101 BPM | SYSTOLIC BLOOD PRESSURE: 117 MMHG | DIASTOLIC BLOOD PRESSURE: 77 MMHG | HEIGHT: 67 IN

## 2022-07-05 DIAGNOSIS — K58.0 IRRITABLE BOWEL SYNDROME WITH DIARRHEA: Primary | ICD-10-CM

## 2022-07-05 PROCEDURE — 99213 OFFICE O/P EST LOW 20 MIN: CPT | Performed by: NURSE PRACTITIONER

## 2022-07-05 NOTE — PROGRESS NOTES
Chief Complaint   Patient presents with   • Diarrhea       Subjective    Lala Padilla is a 27 y.o. female. she is here today for follow-up.    History of Present Illness  27-year-old female presents for follow-up after colonoscopy.  She has lymphocytic colitis treated with Entocort reports while on Entocort symptoms greatly improved bowel movements were formed 1 time per day and she can eat without worry of sudden diarrhea.  When she finished course of medication bowel habits returned to loose watery after most any intake with mucus mixed within the stool.  Follow-up colonoscopy 6/24/2022 noted adequate prep normal perianal digital rectal exam external/internal hemorrhoids several biopsies were obtained.  Terminal ileum random colonic biopsy no no significant histologic abnormality repeat recommended at age 49 for surveillance.       The following portions of the patient's history were reviewed and updated as appropriate:   Past Medical History:   Diagnosis Date   • Acute tonsillitis    • ADHD    • Bipolar 1 disorder (HCC)    • Bronchitis    • Cellulitis     History of abscess, near left axilla      • Contusion of knee    • Depressive disorder    • Diarrhea    • Disease of thyroid gland    • Dysmenorrhea 09/28/2017   • Dysuria    • Encounter for gynecological examination (general) (routine) without abnormal findings    • Encounter for removal of sutures    • Encounter for surveillance of contraceptive pills    • Folliculitis    • Fracture of phalanx of hand     Left thumb      • Gastroenteritis    • Generalized anxiety disorder    • Hypothyroidism, unspecified    • Irregular periods    • Kidney stone    • Knee pain    • Lipoma    • Ovarian cyst    • Pain in joint, ankle and foot    • Pain in wrist    • Panic disorder    • Ruptured cyst of ovary    • Sports physical    • Sprain of ankle    • Strain of neck muscle    • Tick bite    • Upper respiratory infection      Past Surgical History:   Procedure  Laterality Date   • ADENOIDECTOMY     • COLONOSCOPY N/A 2/28/2022    Procedure: COLONOSCOPY;  Surgeon: Polo Gil MD;  Location: [facility] ENDOSCOPY;  Service: Gastroenterology;  Laterality: N/A;   • COLONOSCOPY N/A 6/24/2022    Procedure: COLONOSCOPY;  Surgeon: Polo Gil MD;  Location: [facility] ENDOSCOPY;  Service: Gastroenterology;  Laterality: N/A;   • ENDOSCOPY N/A 2/28/2022    Procedure: ESOPHAGOGASTRODUODENOSCOPY;  Surgeon: Polo Gil MD;  Location: [facility] ENDOSCOPY;  Service: Gastroenterology;  Laterality: N/A;   • INCISION AND DRAINAGE ABSCESS  02/04/2009    Left axillary abscess   • INJECTION OF MEDICATION  05/31/2014    Toradol(1)   • INJECTION OF MEDICATION  05/31/2014    Zofran(1)   • KIDNEY STONE SURGERY     • TONSILLECTOMY       Family History   Problem Relation Age of Onset   • Cancer Other    • Diabetes Other    • Heart disease Other    • Hypertension Other    • Thyroid disease Other      OB History    No obstetric history on file.       Prior to Admission medications    Medication Sig Start Date End Date Taking? Authorizing Provider   EPINEPHrine (EPIPEN) 0.3 MG/0.3ML solution auto-injector injection INJECT CONTENTS OF 1 PEN AS NEEDED FOR ALLERGIC REACTION 3/9/22  Yes ProviderKole MD   lamoTRIgine (LaMICtal) 100 MG tablet Take 100 mg by mouth 2 (Two) Times a Day. 1/2 tab twice daily 5/19/22  Yes ProviderKole MD   levothyroxine (Synthroid) 125 MCG tablet Take 1 tablet by mouth Daily. 1/12/22  Yes Paola Snyder APRN   Methylphenidate HCl ER, PM, (Jornay PM) 40 MG capsule sustained-release 24 hr Take 40 mg by mouth Every Night.   Yes ProviderKole MD   zolpidem (AMBIEN) 10 MG tablet Take 5-10 mg by mouth At Night As Needed. 12/27/21  Yes Kole Cleaning MD   PEG-KCl-NaCl-NaSulf-Na Asc-C (MoviPrep) 100 g reconstituted solution powder Take 1,000 mL by mouth Every 12 (Twelve) Hours. 3/29/22 7/5/22  Carmen Deleon APRN     No Known Allergies  Social  "History     Socioeconomic History   • Marital status: Single   Tobacco Use   • Smoking status: Never Smoker   • Smokeless tobacco: Never Used   Vaping Use   • Vaping Use: Never used   Substance and Sexual Activity   • Alcohol use: No   • Drug use: No   • Sexual activity: Not Currently     Birth control/protection: None       Review of Systems  Review of Systems   Constitutional: Positive for fatigue. Negative for activity change, appetite change, chills, diaphoresis, fever and unexpected weight change.   HENT: Negative for sore throat and trouble swallowing.    Respiratory: Negative for shortness of breath.    Gastrointestinal: Positive for abdominal distention (constant bloating ), abdominal pain and diarrhea (2-4 per day with mucus in stool (stool was normal on entocort with no mucus) ). Negative for anal bleeding, blood in stool, constipation, nausea, rectal pain and vomiting.   Musculoskeletal: Negative for arthralgias.   Skin: Negative for pallor.   Neurological: Negative for light-headedness.        /77 (BP Location: Left arm)   Pulse 101   Ht 170.2 cm (67\")   Wt 63.6 kg (140 lb 3.2 oz)   LMP 06/22/2022   BMI 21.96 kg/m²     Objective    Physical Exam  Constitutional:       General: She is not in acute distress.     Appearance: Normal appearance. She is normal weight. She is not ill-appearing.   HENT:      Head: Normocephalic and atraumatic.   Pulmonary:      Effort: Pulmonary effort is normal.   Abdominal:      General: Abdomen is flat. Bowel sounds are normal. There is no distension.      Palpations: Abdomen is soft. There is no mass.      Tenderness: There is no abdominal tenderness.   Neurological:      Mental Status: She is alert.       Admission on 06/24/2022, Discharged on 06/24/2022   Component Date Value Ref Range Status   • Reference Lab Report 06/24/2022    Final                    Value:[facility]  [address] [id number]  Phone: [phone] or " "[phone]  Fax: [phone]  Sebastian Alcantar M.D., Medical Director    PATIENT NAME                           LABORATORY NO.  BERTHA LOPEZ.           [id number]  [id number]                         AGE              SEX  N           CLIENT REF #  [facility]           27      1995  F    [id number]   [id number]    [place]                       REQUESTING M.D.     ATTENDING MSethD.     COPY TO.  [address] [place] [address]                 [place] [address]             DATE COLLECTED      DATE RECEIVED      DATE REPORTED  2022    DIAGNOSIS:  A.   TERMINAL ILEUM BIOPSY:  No significant histologic abnormality  B.   COLON, BIOPSY:  No significant histologic abnormality    JBS/pah    CLINICAL HISTORY:  Lymphocytic colitis    SPECIMENS RECEIVED:  A.  TERMINAL ILEUM                           BIOPSY  B.  COLON, BIOPSY    MICROSCOPIC DESCRIPTION:  Tissue blocks are prepared and slides are examined microscopically on all  specimens. See diagnosis for details.    Professional interpretation rendered by Blade Jay M.D. at [facility], [address] [place] [address].    GROSS DESCRIPTION:  A.  Specimen is received in 1 formalin filled container labeled \"TI\" and  consists of 2 portions of tan soft tissue measuring 0.5 x 0.5 x 0.2 cm in  aggregate.  Submitted entirely in 1 cassette.  MTH  B.  Specimen is received in 1 formalin filled container labeled \"colonic  mucosa\" and consists of multiple portions of tan soft tissue measuring 0.4  x 0.4 x 0.1 cm in aggregate.  Submitted entirely in 1 cassette.    REVIEWED, DIAGNOSED AND ELECTRONICALLY  SIGNED BY:    Blade Jay M.D.  CPT CODES:  88305x2       Assessment & Plan      1. Irritable bowel syndrome with diarrhea    .   We will treat irritable bowel syndrome diarrhea predominant with Xifaxan 3 times a day for 2 weeks.  Follow-up " in 8 weeks for recheck return office sooner if needed.  Lymphocytic colitis noted on TI biopsy on colonoscopy in February-resolved.  Orders placed during this encounter include:  No orders of the defined types were placed in this encounter.      * Surgery not found *    Review and/or summary of lab tests, radiology, procedures, medications. Review and summary of old records and obtaining of history. The risks and benefits of my recommendations, as well as other treatment options were discussed with the patient today. Questions were answered.    New Medications Ordered This Visit   Medications   • riFAXIMin (Xifaxan) 550 MG tablet     Sig: Take 1 tablet by mouth 3 (Three) Times a Day.     Dispense:  42 tablet     Refill:  1       Follow-up: Return in about 4 weeks (around 8/2/2022) for Recheck, After test.          This document has been electronically signed by MILLY Torres on July 5, 2022 11:08 CDT           I spent 21 minutes caring for Lala on this date of service. This time includes time spent by me in the following activities:preparing for the visit, reviewing tests, obtaining and/or reviewing a separately obtained history, performing a medically appropriate examination and/or evaluation , counseling and educating the patient/family/caregiver, ordering medications, tests, or procedures, referring and communicating with other health care professionals , documenting information in the medical record and care coordination    Results for orders placed or performed during the hospital encounter of 06/24/22   TISSUE EXAM, [facility] ([facility])    Specimen: A: Small Intestine, Ileum; Tissue    B: Large Intestine; Tissue   Result Value Ref Range    Reference Lab Report       [facility]  [address] [place] [address]  Phone: [phone] or [phone]  Fax: [phone]  Sebastian Alcantar M.D., Medical Director    PATIENT NAME                           LABORATORY NO.  1800  " BERTHA MERCADO.           [id number]  [id number]                         AGE              SEX  [facility]           CLIENT REF #  [facility]           27      1995  F    [id number]   [id number]    [place]                       REQUESTING M.D.     ATTENDING M.D.     COPY TO.  [address] [place] [address]                 [place] [address]             DATE COLLECTED      DATE RECEIVED      DATE REPORTED  2022    DIAGNOSIS:  A.   TERMINAL ILEUM BIOPSY:  No significant histologic abnormality  B.   COLON, BIOPSY:  No significant histologic abnormality    JBS/pah    CLINICAL HISTORY:  Lymphocytic colitis    SPECIMENS RECEIVED:  A.  TERMINAL ILEUM  BIOPSY  B.  COLON, BIOPSY    MICROSCOPIC DESCRIPTION:  Tissue blocks are prepared and slides are examined microscopically on all  specimens. See diagnosis for details.    Professional interpretation rendered by Blade Jay M.D. at [facility],  [facility], [address] [place] [address] , [place] [address].    GROSS DESCRIPTION:  A.  Specimen is received in 1 formalin filled container labeled \"TI\" and  consists of 2 portions of tan soft tissue measuring 0.5 x 0.5 x 0.2 cm in  aggregate.  Submitted entirely in 1 cassette.  MTH  B.  Specimen is received in 1 formalin filled container labeled \"colonic  mucosa\" and consists of multiple portions of tan soft tissue measuring 0.4  x 0.4 x 0.1 cm in aggregate.  Submitted entirely in 1 cassette.    REVIEWED, DIAGNOSED AND ELECTRONICALLY  SIGNED BY:    Blade Jay M.D.  CPT CODES:  88305x2     Results for orders placed or performed in visit on 22   CBC Auto Differential    Specimen: Blood   Result Value Ref Range    WBC 8.24 3.40 - 10.80 10*3/mm3    RBC 4.93 3.77 - 5.28 10*6/mm3    Hemoglobin 14.0 12.0 - 15.9 g/dL    Hematocrit 41.0 34.0 - 46.6 %    MCV 83.2 79.0 - 97.0 fL    MCH 28.4 26.6 - 33.0 pg    MCHC 34.1 31.5 - 35.7 g/dL "    RDW 13.7 12.3 - 15.4 %    RDW-SD 41.4 37.0 - 54.0 fl    MPV 8.7 6.0 - 12.0 fL    Platelets 244 140 - 450 10*3/mm3    Neutrophil % 74.5 42.7 - 76.0 %    Lymphocyte % 12.1 (L) 19.6 - 45.3 %    Monocyte % 8.0 5.0 - 12.0 %    Eosinophil % 4.2 0.3 - 6.2 %    Basophil % 0.5 0.0 - 1.5 %    Immature Grans % 0.7 (H) 0.0 - 0.5 %    Neutrophils, Absolute 6.13 1.70 - 7.00 10*3/mm3    Lymphocytes, Absolute 1.00 0.70 - 3.10 10*3/mm3    Monocytes, Absolute 0.66 0.10 - 0.90 10*3/mm3    Eosinophils, Absolute 0.35 0.00 - 0.40 10*3/mm3    Basophils, Absolute 0.04 0.00 - 0.20 10*3/mm3    Immature Grans, Absolute 0.06 (H) 0.00 - 0.05 10*3/mm3    nRBC 0.0 0.0 - 0.2 /100 WBC   Vitamin D 25 Hydroxy    Specimen: Blood   Result Value Ref Range    25 Hydroxy, Vitamin D 24.7 (L) 30.0 - 100.0 ng/ml   TSH    Specimen: Blood   Result Value Ref Range    TSH 0.167 (L) 0.270 - 4.200 uIU/mL   Comprehensive Metabolic Panel    Specimen: Blood   Result Value Ref Range    Glucose 89 65 - 99 mg/dL    BUN 10 6 - 20 mg/dL    Creatinine 0.96 0.57 - 1.00 mg/dL    Sodium 141 136 - 145 mmol/L    Potassium 4.6 3.5 - 5.2 mmol/L    Chloride 105 98 - 107 mmol/L    CO2 28.0 22.0 - 29.0 mmol/L    Calcium 10.3 8.6 - 10.5 mg/dL    Total Protein 7.3 6.0 - 8.5 g/dL    Albumin 4.40 3.50 - 5.20 g/dL    ALT (SGPT) 22 1 - 33 U/L    AST (SGOT) 27 1 - 32 U/L    Alkaline Phosphatase 89 39 - 117 U/L    Total Bilirubin 0.2 0.0 - 1.2 mg/dL    Globulin 2.9 gm/dL    A/G Ratio 1.5 g/dL    BUN/Creatinine Ratio 10.4 7.0 - 25.0    Anion Gap 8.0 5.0 - 15.0 mmol/L    eGFR 82.8 >60.0 mL/min/1.73   Results for orders placed or performed in visit on 03/21/22   Pregnancy, Urine - Urine, Clean Catch    Specimen: Urine, Clean Catch   Result Value Ref Range    HCG, Urine QL Negative Negative   Results for orders placed or performed in visit on 03/07/22   IBD Sgi Diagnostic    Specimen: Blood   Result Value Ref Range    Reference Lab Report See Attached Report  IBD sgi Diagnostic    Results  for orders placed or performed during the hospital encounter of 02/28/22   Tissue Pathology Exam    Specimen: A: Small Intestine, Duodenum; Tissue    B: Gastric, Antrum; Tissue    C: Esophagus, Distal; Tissue    D: Small Intestine, Ileum; Tissue    E: Large Intestine; Tissue   Result Value Ref Range    Case Report       Surgical Pathology Report                         Case: [id number]                                  Authorizing Provider:  Polo Gil MD        Collected:           02/28/2022 01:22 PM          Ordering Location:     [facility]   Received:            02/28/2022 01:52 PM                                 [place] [facility]                                                     Pathologist:           Blade Jay MD                                                           Specimens:   1) - Small Intestine, Duodenum, JA                                                                  2) - Gastric, Antrum, JA                                                                            3) - Esophagus, Distal, JA                                                                          4) - Small Intestine, Ileum, TI   JA                                                                5) - Large Intestine, colonic mucosa   JA                                                  Final Diagnosis       See Scanned Report       Results for orders placed or performed in visit on 02/01/22   Glia(IgA / G) & TTG(IgA / G)    Specimen: Blood   Result Value Ref Range    Gliadin Deamidated Peptide Ab, IgA 3 0 - 19 units    Deaminated Gliadin Ab IgG 5 0 - 19 units    Tissue Transglutaminase IgA <2 0 - 3 U/mL    Tissue Transglutaminase IgG 2 0 - 5 U/mL   Allergens (12) Foods    Specimen: Blood   Result Value Ref Range    Class Description Comment     Egg White 0.69 (A) Class II kU/L    Milk, Cow's 0.39 (A) Class I kU/L    CodFish 0.21 (A) Class 0/I kU/L    Sesame Seed 0.24 (A) Class 0/I kU/L    Peanut  0.37 (A) Class I kU/L    Soybean 0.31 (A) Class 0/I kU/L    Hazelnut 0.29 (A) Class 0/I kU/L    Shrimp 0.42 (A) Class I kU/L    Scallop 0.32 (A) Class I kU/L    Gluten 0.65 (A) Class II kU/L    Chelsea 0.25 (A) Class 0/I kU/L    Wheat 0.94 (A) Class II kU/L   Results for orders placed or performed in visit on 01/10/22   CBC Auto Differential    Specimen: Blood   Result Value Ref Range    WBC 8.21 3.40 - 10.80 10*3/mm3    RBC 4.76 3.77 - 5.28 10*6/mm3    Hemoglobin 13.6 12.0 - 15.9 g/dL    Hematocrit 39.3 34.0 - 46.6 %    MCV 82.6 79.0 - 97.0 fL    MCH 28.6 26.6 - 33.0 pg    MCHC 34.6 31.5 - 35.7 g/dL    RDW 13.7 12.3 - 15.4 %    RDW-SD 41.0 37.0 - 54.0 fl    MPV 9.5 6.0 - 12.0 fL    Platelets 268 140 - 450 10*3/mm3    Neutrophil % 73.6 42.7 - 76.0 %    Lymphocyte % 16.8 (L) 19.6 - 45.3 %    Monocyte % 7.3 5.0 - 12.0 %    Eosinophil % 1.5 0.3 - 6.2 %    Basophil % 0.4 0.0 - 1.5 %    Immature Grans % 0.4 0.0 - 0.5 %    Neutrophils, Absolute 6.05 1.70 - 7.00 10*3/mm3    Lymphocytes, Absolute 1.38 0.70 - 3.10 10*3/mm3    Monocytes, Absolute 0.60 0.10 - 0.90 10*3/mm3    Eosinophils, Absolute 0.12 0.00 - 0.40 10*3/mm3    Basophils, Absolute 0.03 0.00 - 0.20 10*3/mm3    Immature Grans, Absolute 0.03 0.00 - 0.05 10*3/mm3    nRBC 0.0 0.0 - 0.2 /100 WBC   Vitamin D 25 Hydroxy    Specimen: Blood   Result Value Ref Range    25 Hydroxy, Vitamin D 26.9 ng/ml   T3    Specimen: Blood   Result Value Ref Range    T3, Total 112.0 80.0 - 200.0 ng/dl   TSH    Specimen: Blood   Result Value Ref Range    TSH 6.710 (H) 0.270 - 4.200 uIU/mL   T4, Free    Specimen: Blood   Result Value Ref Range    Free T4 1.39 0.93 - 1.70 ng/dL   Iron    Specimen: Blood   Result Value Ref Range    Iron 36 (L) 37 - 145 mcg/dL   Vitamin B12    Specimen: Blood   Result Value Ref Range    Vitamin B-12 486 211 - 946 pg/mL   Comprehensive Metabolic Panel    Specimen: Blood   Result Value Ref Range    Glucose 82 65 - 99 mg/dL    BUN 14 6 - 20 mg/dL    Creatinine  0.78 0.57 - 1.00 mg/dL    Sodium 138 136 - 145 mmol/L    Potassium 4.4 3.5 - 5.2 mmol/L    Chloride 101 98 - 107 mmol/L    CO2 27.0 22.0 - 29.0 mmol/L    Calcium 9.8 8.6 - 10.5 mg/dL    Total Protein 6.5 6.0 - 8.5 g/dL    Albumin 4.50 3.50 - 5.20 g/dL    ALT (SGPT) 15 1 - 33 U/L    AST (SGOT) 19 1 - 32 U/L    Alkaline Phosphatase 77 39 - 117 U/L    Total Bilirubin 0.3 0.0 - 1.2 mg/dL    eGFR Non African Amer 88 >60 mL/min/1.73    Globulin 2.0 gm/dL    A/G Ratio 2.3 g/dL    BUN/Creatinine Ratio 17.9 7.0 - 25.0    Anion Gap 10.0 5.0 - 15.0 mmol/L     *Note: Due to a large number of results and/or encounters for the requested time period, some results have not been displayed. A complete set of results can be found in Results Review.

## 2022-08-05 ENCOUNTER — LAB (OUTPATIENT)
Dept: LAB | Facility: HOSPITAL | Age: 27
End: 2022-08-05

## 2022-08-05 ENCOUNTER — OFFICE VISIT (OUTPATIENT)
Dept: FAMILY MEDICINE CLINIC | Facility: CLINIC | Age: 27
End: 2022-08-05

## 2022-08-05 VITALS
HEIGHT: 67 IN | OXYGEN SATURATION: 99 % | DIASTOLIC BLOOD PRESSURE: 76 MMHG | BODY MASS INDEX: 21.35 KG/M2 | WEIGHT: 136 LBS | HEART RATE: 100 BPM | SYSTOLIC BLOOD PRESSURE: 112 MMHG

## 2022-08-05 DIAGNOSIS — E03.8 HYPOTHYROIDISM DUE TO HASHIMOTO'S THYROIDITIS: Primary | ICD-10-CM

## 2022-08-05 DIAGNOSIS — E06.3 HYPOTHYROIDISM DUE TO HASHIMOTO'S THYROIDITIS: Primary | ICD-10-CM

## 2022-08-05 PROCEDURE — 84443 ASSAY THYROID STIM HORMONE: CPT | Performed by: NURSE PRACTITIONER

## 2022-08-05 PROCEDURE — 36415 COLL VENOUS BLD VENIPUNCTURE: CPT | Performed by: NURSE PRACTITIONER

## 2022-08-05 PROCEDURE — 99213 OFFICE O/P EST LOW 20 MIN: CPT | Performed by: NURSE PRACTITIONER

## 2022-08-05 NOTE — PROGRESS NOTES
Chief Complaint   Patient presents with   • Hypothyroidism     6 month check up     Subjective   Lala Padilla is a 27 y.o. female.           Hypothyroidism  This is a chronic problem. The current episode started more than 1 year ago. The problem has been waxing and waning. Associated symptoms include fatigue. Pertinent negatives include no chest pain, congestion, coughing, headaches, joint swelling, myalgias, nausea, neck pain, numbness, sore throat, swollen glands or urinary symptoms. The symptoms are aggravated by stress. She has tried rest (synthroid ) for the symptoms. The treatment provided mild relief.        The following portions of the patient's history were reviewed and updated as appropriate: allergies, current medications, past social history and problem list.    Review of Systems   Constitutional: Positive for activity change and fatigue. Negative for appetite change and unexpected weight change.   HENT: Negative.  Negative for congestion, dental problem, drooling, ear discharge, ear pain, facial swelling, hearing loss, mouth sores, nosebleeds, postnasal drip, rhinorrhea, sinus pressure, sinus pain, sneezing, sore throat, tinnitus, trouble swallowing and voice change.         Reports trouble swallowing on and off    Eyes: Negative.  Negative for photophobia, pain, discharge, redness, itching and visual disturbance.   Respiratory: Negative.  Negative for apnea, cough, choking, chest tightness, shortness of breath, wheezing and stridor.    Cardiovascular: Negative.  Negative for chest pain, palpitations and leg swelling.   Gastrointestinal: Negative.  Negative for abdominal distention, anal bleeding, blood in stool, diarrhea, nausea and rectal pain.   Endocrine: Negative.  Negative for polydipsia, polyphagia and polyuria.   Genitourinary: Negative.  Negative for difficulty urinating and genital sores.   Musculoskeletal: Negative.  Negative for joint swelling, myalgias, neck pain and neck  "stiffness.   Skin: Negative for color change, pallor and wound.        Reports color changes extremities    Allergic/Immunologic: Negative.  Negative for environmental allergies, food allergies and immunocompromised state.   Neurological: Negative.  Negative for seizures, syncope, facial asymmetry, speech difficulty, light-headedness, numbness and headaches.   Hematological: Negative.  Negative for adenopathy. Does not bruise/bleed easily.   Psychiatric/Behavioral: Positive for decreased concentration. Negative for agitation, behavioral problems, confusion, dysphoric mood, hallucinations and self-injury. The patient is not hyperactive.         Currently treating for add   Bipolar        Objective   /76   Pulse 100   Ht 170.2 cm (67\")   Wt 61.7 kg (136 lb)   SpO2 99%   BMI 21.30 kg/m²   Physical Exam  Vitals and nursing note reviewed.   Constitutional:       General: She is not in acute distress.     Appearance: Normal appearance. She is not ill-appearing or diaphoretic.   HENT:      Head: Normocephalic and atraumatic.      Right Ear: Tympanic membrane normal.      Left Ear: Tympanic membrane normal.      Nose: Nose normal. No congestion or rhinorrhea.      Mouth/Throat:      Mouth: Mucous membranes are moist.      Pharynx: No oropharyngeal exudate or posterior oropharyngeal erythema.   Eyes:      Extraocular Movements: Extraocular movements intact.      Pupils: Pupils are equal, round, and reactive to light.   Neck:      Vascular: No carotid bruit.   Cardiovascular:      Rate and Rhythm: Normal rate and regular rhythm.      Pulses: Normal pulses.      Heart sounds: No murmur heard.    No friction rub. No gallop.   Pulmonary:      Effort: Pulmonary effort is normal. No respiratory distress.      Breath sounds: No stridor. No wheezing, rhonchi or rales.   Chest:      Chest wall: No tenderness.   Abdominal:      General: Abdomen is flat. There is no distension.      Palpations: There is no mass.      " Tenderness: There is no abdominal tenderness. There is no guarding or rebound.      Hernia: No hernia is present.   Musculoskeletal:         General: No swelling, tenderness, deformity or signs of injury. Normal range of motion.      Cervical back: Normal range of motion. No rigidity or tenderness.      Right lower leg: No edema.      Left lower leg: No edema.   Lymphadenopathy:      Cervical: No cervical adenopathy.   Skin:     General: Skin is warm.      Coloration: Skin is not jaundiced or pale.      Findings: No bruising, erythema, lesion or rash.      Comments: Discolored toes and fingers probably raynauds        Neurological:      General: No focal deficit present.      Mental Status: She is alert and oriented to person, place, and time.      Cranial Nerves: No cranial nerve deficit.      Sensory: No sensory deficit.      Motor: No weakness.      Coordination: Coordination normal.   Psychiatric:         Mood and Affect: Mood normal.         Behavior: Behavior normal.              Assessment & Plan     Problems Addressed this Visit        Endocrine and Metabolic    Hypothyroidism - Primary    Relevant Orders    TSH      Diagnoses       Codes Comments    Hypothyroidism due to Hashimoto's thyroiditis    -  Primary ICD-10-CM: E03.8, E06.3  ICD-9-CM: 244.8, 245.2          No orders of the defined types were placed in this encounter.    Current Outpatient Medications on File Prior to Visit   Medication Sig Dispense Refill   • EPINEPHrine (EPIPEN) 0.3 MG/0.3ML solution auto-injector injection INJECT CONTENTS OF 1 PEN AS NEEDED FOR ALLERGIC REACTION     • lamoTRIgine (LaMICtal) 100 MG tablet Take 100 mg by mouth 2 (Two) Times a Day. 1/2 tab twice daily     • levothyroxine (Synthroid) 125 MCG tablet Take 1 tablet by mouth Daily. 90 tablet 1   • Methylphenidate HCl ER, PM, (Jornay PM) 40 MG capsule sustained-release 24 hr Take 40 mg by mouth Every Night.     • zolpidem (AMBIEN) 10 MG tablet Take 5-10 mg by mouth At  Night As Needed.     • [DISCONTINUED] riFAXIMin (Xifaxan) 550 MG tablet Take 1 tablet by mouth 3 (Three) Times a Day. 42 tablet 1     No current facility-administered medications on file prior to visit.       15 minutes   Follow Up   No follow-ups on file.          It's not just what you eat, but when you eat  Eat breakfast, and eat smaller meals throughout the day. A healthy breakfast can jumpstart your metabolism, while eating small, healthy meals (rather than the standard three large meals) keeps your energy up.   Avoid eating at night. Try to eat dinner earlier and fast for 14-16 hours until breakfast the next morning. Studies suggest that eating only when you’re most active and giving your digestive system a long break each day may help to regulate weight.     Labs today, continue synthroid -follow up in 6 months as directed

## 2022-08-06 LAB — TSH SERPL DL<=0.05 MIU/L-ACNC: 24.5 UIU/ML (ref 0.27–4.2)

## 2022-08-08 RX ORDER — LEVOTHYROXINE SODIUM 0.12 MG/1
125 TABLET ORAL DAILY
Qty: 90 TABLET | Refills: 1 | Status: SHIPPED | OUTPATIENT
Start: 2022-08-08 | End: 2023-02-22 | Stop reason: SDUPTHER

## 2022-09-06 ENCOUNTER — OFFICE VISIT (OUTPATIENT)
Dept: GASTROENTEROLOGY | Facility: CLINIC | Age: 27
End: 2022-09-06

## 2022-09-06 VITALS
WEIGHT: 135.4 LBS | HEIGHT: 67 IN | SYSTOLIC BLOOD PRESSURE: 129 MMHG | DIASTOLIC BLOOD PRESSURE: 83 MMHG | BODY MASS INDEX: 21.25 KG/M2 | HEART RATE: 88 BPM

## 2022-09-06 DIAGNOSIS — K58.0 IRRITABLE BOWEL SYNDROME WITH DIARRHEA: Primary | ICD-10-CM

## 2022-09-06 PROCEDURE — 99213 OFFICE O/P EST LOW 20 MIN: CPT | Performed by: NURSE PRACTITIONER

## 2022-09-06 NOTE — PROGRESS NOTES
Chief Complaint   Patient presents with   • Follow-up     2 month    • Diarrhea       Subjective    Lala Padilla is a 27 y.o. female. she is here today for follow-up.    History of Present Illness  27-year-old female presents for recheck regarding irritable bowel syndrome diarrhea predominant.  She has history of lymphocytic colitis treated with Entocort.  Follow-up colonoscopy June 2022 colon appeared normal and biopsies showed no significant histologic abnormality repeat recommended age 49 for surveillance.  At that time patient was having bowel movements 6-8 times per day associated with urgency constant bloating and mucus within the stool.  States after taking 2-week course of Xifaxan bowel movements are 1-2 times per day with occasional mucus has not seen any blood in stool and reports lessened abdominal pain or bloating recently.       The following portions of the patient's history were reviewed and updated as appropriate:   Past Medical History:   Diagnosis Date   • Acute tonsillitis    • ADHD    • Bipolar 1 disorder (HCC)    • Bronchitis    • Cellulitis     History of abscess, near left axilla      • Contusion of knee    • Depressive disorder    • Diarrhea    • Disease of thyroid gland    • Dysmenorrhea 09/28/2017   • Dysuria    • Encounter for gynecological examination (general) (routine) without abnormal findings    • Encounter for removal of sutures    • Encounter for surveillance of contraceptive pills    • Folliculitis    • Fracture of phalanx of hand     Left thumb      • Gastroenteritis    • Generalized anxiety disorder    • Hypothyroidism, unspecified    • Irregular periods    • Kidney stone    • Knee pain    • Lipoma    • Ovarian cyst    • Pain in joint, ankle and foot    • Pain in wrist    • Panic disorder    • Ruptured cyst of ovary    • Sports physical    • Sprain of ankle    • Strain of neck muscle    • Tick bite    • Upper respiratory infection      Past Surgical History:    Procedure Laterality Date   • ADENOIDECTOMY     • COLONOSCOPY N/A 2/28/2022    Procedure: COLONOSCOPY;  Surgeon: Polo Gil MD;  Location: [facility] ENDOSCOPY;  Service: Gastroenterology;  Laterality: N/A;   • COLONOSCOPY N/A 6/24/2022    Procedure: COLONOSCOPY;  Surgeon: Polo Gil MD;  Location: [facility] ENDOSCOPY;  Service: Gastroenterology;  Laterality: N/A;   • ENDOSCOPY N/A 2/28/2022    Procedure: ESOPHAGOGASTRODUODENOSCOPY;  Surgeon: Polo Gil MD;  Location: [facility] ENDOSCOPY;  Service: Gastroenterology;  Laterality: N/A;   • INCISION AND DRAINAGE ABSCESS  02/04/2009    Left axillary abscess   • INJECTION OF MEDICATION  05/31/2014    Toradol(1)   • INJECTION OF MEDICATION  05/31/2014    Zofran(1)   • KIDNEY STONE SURGERY     • TONSILLECTOMY       Family History   Problem Relation Age of Onset   • Cancer Other    • Diabetes Other    • Heart disease Other    • Hypertension Other    • Thyroid disease Other      OB History    No obstetric history on file.       Prior to Admission medications    Medication Sig Start Date End Date Taking? Authorizing Provider   EPINEPHrine (EPIPEN) 0.3 MG/0.3ML solution auto-injector injection INJECT CONTENTS OF 1 PEN AS NEEDED FOR ALLERGIC REACTION 3/9/22  Yes Kole Cleaning MD   lamoTRIgine (LaMICtal) 100 MG tablet Take 100 mg by mouth 2 (Two) Times a Day. 1/2 tab twice daily 5/19/22  Yes Kole Cleaning MD   levothyroxine (Synthroid) 125 MCG tablet Take 1 tablet by mouth Daily. 8/8/22  Yes Paola Snyder APRN   zolpidem (AMBIEN) 10 MG tablet Take 5-10 mg by mouth At Night As Needed. 12/27/21  Yes Kole Cleaning MD   Methylphenidate HCl ER, PM, (Jameely PM) 40 MG capsule sustained-release 24 hr Take 40 mg by mouth Every Night.  9/6/22  Kole Cleaning MD     No Known Allergies  Social History     Socioeconomic History   • Marital status: Single   Tobacco Use   • Smoking status: Never Smoker   • Smokeless tobacco: Never Used   Vaping  "Use   • Vaping Use: Never used   Substance and Sexual Activity   • Alcohol use: No   • Drug use: No   • Sexual activity: Not Currently     Birth control/protection: None       Review of Systems  Review of Systems   Constitutional: Negative for activity change, appetite change, chills, diaphoresis, fatigue, fever and unexpected weight change.   HENT: Negative for sore throat and trouble swallowing.    Respiratory: Negative for shortness of breath.    Gastrointestinal: Negative for abdominal distention, abdominal pain, anal bleeding, blood in stool, constipation, diarrhea (much better now about 2x per day occ mucus ), nausea, rectal pain and vomiting.   Musculoskeletal: Negative for arthralgias.   Skin: Negative for pallor.   Neurological: Negative for light-headedness.        /83 (BP Location: Right arm)   Pulse 88   Ht 170.2 cm (67\")   Wt 61.4 kg (135 lb 6.4 oz)   BMI 21.21 kg/m²     Objective    Physical Exam  Constitutional:       General: She is not in acute distress.     Appearance: Normal appearance. She is normal weight. She is not ill-appearing.   HENT:      Head: Normocephalic and atraumatic.   Pulmonary:      Effort: Pulmonary effort is normal.   Abdominal:      General: Abdomen is flat. Bowel sounds are normal. There is no distension.      Palpations: Abdomen is soft. There is no mass.      Tenderness: There is abdominal tenderness (mild ) in the right lower quadrant.   Neurological:      Mental Status: She is alert.       Office Visit on 08/05/2022   Component Date Value Ref Range Status   • TSH 08/05/2022 24.500 (A) 0.270 - 4.200 uIU/mL Final     Assessment & Plan      1. Irritable bowel syndrome with diarrhea    .   Continue with diet lifestyle modifications discussed with patient if symptoms of cramping or diarrhea recur could send in dicyclomine or repeat course of Xifaxan if persistent.  Follow-up in 6 months for recheck return office sooner if needed    Orders placed during this encounter " include:  No orders of the defined types were placed in this encounter.      * Surgery not found *    Review and/or summary of lab tests, radiology, procedures, medications. Review and summary of old records and obtaining of history. The risks and benefits of my recommendations, as well as other treatment options were discussed with the patient today. Questions were answered.    No orders of the defined types were placed in this encounter.      Follow-up: Return in about 6 months (around 3/6/2023) for Recheck.          This document has been electronically signed by MILLY Torres on 2022 10:43 CDT           I spent 16 minutes caring for Lala on this date of service. This time includes time spent by me in the following activities:preparing for the visit, reviewing tests, obtaining and/or reviewing a separately obtained history, performing a medically appropriate examination and/or evaluation , counseling and educating the patient/family/caregiver, ordering medications, tests, or procedures, referring and communicating with other health care professionals , documenting information in the medical record and care coordination    Results for orders placed or performed in visit on 22   TSH    Specimen: Blood   Result Value Ref Range    TSH 24.500 (H) 0.270 - 4.200 uIU/mL   Results for orders placed or performed during the hospital encounter of 22   TISSUE EXAM, [facility] ([facility])    Specimen: A: Small Intestine, Ileum; Tissue    B: Large Intestine; Tissue   Result Value Ref Range    Reference Lab Report       [facility]  [address] [place] [address]  Phone: [phone] or [phone]  Fax: [phone]  Sebastian Alcantar M.D., Medical Director    PATIENT NAME                           LABORATORY NO.  [id number]  LALA MERCADO.           [id number]  [id number]                         AGE              SEX  SSN           CLIENT REF  "#  [facility]           27      1995  F    [id number]   [id number]    [place]                       REQUESTING M.D.     ATTENDING M.D.     COPY TO.  [address] [place] [address]                 [place] [address]             DATE COLLECTED      DATE RECEIVED      DATE REPORTED  06/24/2022 06/27/2022 06/30/2022    DIAGNOSIS:  A.   TERMINAL ILEUM BIOPSY:  No significant histologic abnormality  B.   COLON, BIOPSY:  No significant histologic abnormality    JBS/pah    CLINICAL HISTORY:  Lymphocytic colitis    SPECIMENS RECEIVED:  A.  TERMINAL ILEUM  BIOPSY  B.  COLON, BIOPSY    MICROSCOPIC DESCRIPTION:  Tissue blocks are prepared and slides are examined microscopically on all  specimens. See diagnosis for details.    Professional interpretation rendered by Blade Jay M.D. at [facility], [address] [place] [address] , [place] [address].    GROSS DESCRIPTION:  A.  Specimen is received in 1 formalin filled container labeled \"TI\" and  consists of 2 portions of tan soft tissue measuring 0.5 x 0.5 x 0.2 cm in  aggregate.  Submitted entirely in 1 cassette.  MTH  B.  Specimen is received in 1 formalin filled container labeled \"colonic  mucosa\" and consists of multiple portions of tan soft tissue measuring 0.4  x 0.4 x 0.1 cm in aggregate.  Submitted entirely in 1 cassette.    REVIEWED, DIAGNOSED AND ELECTRONICALLY  SIGNED BY:    Blade Jay M.D.  CPT CODES:  88305x2     Results for orders placed or performed in visit on 06/02/22   CBC Auto Differential    Specimen: Blood   Result Value Ref Range    WBC 8.24 3.40 - 10.80 10*3/mm3    RBC 4.93 3.77 - 5.28 10*6/mm3    Hemoglobin 14.0 12.0 - 15.9 g/dL    Hematocrit 41.0 34.0 - 46.6 %    MCV 83.2 79.0 - 97.0 fL    MCH 28.4 26.6 - 33.0 pg    MCHC 34.1 31.5 - 35.7 g/dL    RDW 13.7 12.3 - 15.4 %    RDW-SD 41.4 37.0 - 54.0 fl    MPV 8.7 6.0 - 12.0 fL    Platelets 244 140 - 450 10*3/mm3    Neutrophil % 74.5 " 42.7 - 76.0 %    Lymphocyte % 12.1 (L) 19.6 - 45.3 %    Monocyte % 8.0 5.0 - 12.0 %    Eosinophil % 4.2 0.3 - 6.2 %    Basophil % 0.5 0.0 - 1.5 %    Immature Grans % 0.7 (H) 0.0 - 0.5 %    Neutrophils, Absolute 6.13 1.70 - 7.00 10*3/mm3    Lymphocytes, Absolute 1.00 0.70 - 3.10 10*3/mm3    Monocytes, Absolute 0.66 0.10 - 0.90 10*3/mm3    Eosinophils, Absolute 0.35 0.00 - 0.40 10*3/mm3    Basophils, Absolute 0.04 0.00 - 0.20 10*3/mm3    Immature Grans, Absolute 0.06 (H) 0.00 - 0.05 10*3/mm3    nRBC 0.0 0.0 - 0.2 /100 WBC   Vitamin D 25 Hydroxy    Specimen: Blood   Result Value Ref Range    25 Hydroxy, Vitamin D 24.7 (L) 30.0 - 100.0 ng/ml   TSH    Specimen: Blood   Result Value Ref Range    TSH 0.167 (L) 0.270 - 4.200 uIU/mL   Comprehensive Metabolic Panel    Specimen: Blood   Result Value Ref Range    Glucose 89 65 - 99 mg/dL    BUN 10 6 - 20 mg/dL    Creatinine 0.96 0.57 - 1.00 mg/dL    Sodium 141 136 - 145 mmol/L    Potassium 4.6 3.5 - 5.2 mmol/L    Chloride 105 98 - 107 mmol/L    CO2 28.0 22.0 - 29.0 mmol/L    Calcium 10.3 8.6 - 10.5 mg/dL    Total Protein 7.3 6.0 - 8.5 g/dL    Albumin 4.40 3.50 - 5.20 g/dL    ALT (SGPT) 22 1 - 33 U/L    AST (SGOT) 27 1 - 32 U/L    Alkaline Phosphatase 89 39 - 117 U/L    Total Bilirubin 0.2 0.0 - 1.2 mg/dL    Globulin 2.9 gm/dL    A/G Ratio 1.5 g/dL    BUN/Creatinine Ratio 10.4 7.0 - 25.0    Anion Gap 8.0 5.0 - 15.0 mmol/L    eGFR 82.8 >60.0 mL/min/1.73   Results for orders placed or performed in visit on 03/21/22   Pregnancy, Urine - Urine, Clean Catch    Specimen: Urine, Clean Catch   Result Value Ref Range    HCG, Urine QL Negative Negative   Results for orders placed or performed in visit on 03/07/22   IBD Sgi Diagnostic    Specimen: Blood   Result Value Ref Range    Reference Lab Report See Attached Report  IBD sgi Diagnostic    Results for orders placed or performed during the hospital encounter of 02/28/22   Tissue Pathology Exam    Specimen: A: Small Intestine, Duodenum;  Tissue    B: Gastric, Antrum; Tissue    C: Esophagus, Distal; Tissue    D: Small Intestine, Ileum; Tissue    E: Large Intestine; Tissue   Result Value Ref Range    Case Report       Surgical Pathology Report                         Case: [id number]                                  Authorizing Provider:  Polo Gil MD        Collected:           02/28/2022 01:22 PM          Ordering Location:     [facility]   Received:            02/28/2022 01:52 PM                                 [facility]                                                     Pathologist:           Blade Jay MD                                                           Specimens:   1) - Small Intestine, Duodenum, JA                                                                  2) - Gastric, Antrum, JA                                                                            3) - Esophagus, Distal, JA                                                                          4) - Small Intestine, Ileum, TI   JA                                                                5) - Large Intestine, colonic mucosa   JA                                                  Final Diagnosis       See Scanned Report       Results for orders placed or performed in visit on 02/01/22   Glia(IgA / G) & TTG(IgA / G)    Specimen: Blood   Result Value Ref Range    Gliadin Deamidated Peptide Ab, IgA 3 0 - 19 units    Deaminated Gliadin Ab IgG 5 0 - 19 units    Tissue Transglutaminase IgA <2 0 - 3 U/mL    Tissue Transglutaminase IgG 2 0 - 5 U/mL   Allergens (12) Foods    Specimen: Blood   Result Value Ref Range    Class Description Comment     Egg White 0.69 (A) Class II kU/L    Milk, Cow's 0.39 (A) Class I kU/L    CodFish 0.21 (A) Class 0/I kU/L    Sesame Seed 0.24 (A) Class 0/I kU/L    Peanut 0.37 (A) Class I kU/L    Soybean 0.31 (A) Class 0/I kU/L    Hazelnut 0.29 (A) Class 0/I kU/L    Shrimp 0.42 (A) Class I kU/L    Scallop 0.32  (A) Class I kU/L    Gluten 0.65 (A) Class II kU/L    Dunn[place]on 0.25 (A) Class 0/I kU/L    Wheat 0.94 (A) Class II kU/L   Results for orders placed or performed in visit on 01/10/22   CBC Auto Differential    Specimen: Blood   Result Value Ref Range    WBC 8.21 3.40 - 10.80 10*3/mm3    RBC 4.76 3.77 - 5.28 10*6/mm3    Hemoglobin 13.6 12.0 - 15.9 g/dL    Hematocrit 39.3 34.0 - 46.6 %    MCV 82.6 79.0 - 97.0 fL    MCH 28.6 26.6 - 33.0 pg    MCHC 34.6 31.5 - 35.7 g/dL    RDW 13.7 12.3 - 15.4 %    RDW-SD 41.0 37.0 - 54.0 fl    MPV 9.5 6.0 - 12.0 fL    Platelets 268 140 - 450 10*3/mm3    Neutrophil % 73.6 42.7 - 76.0 %    Lymphocyte % 16.8 (L) 19.6 - 45.3 %    Monocyte % 7.3 5.0 - 12.0 %    Eosinophil % 1.5 0.3 - 6.2 %    Basophil % 0.4 0.0 - 1.5 %    Immature Grans % 0.4 0.0 - 0.5 %    Neutrophils, Absolute 6.05 1.70 - 7.00 10*3/mm3    Lymphocytes, Absolute 1.38 0.70 - 3.10 10*3/mm3    Monocytes, Absolute 0.60 0.10 - 0.90 10*3/mm3    Eosinophils, Absolute 0.12 0.00 - 0.40 10*3/mm3    Basophils, Absolute 0.03 0.00 - 0.20 10*3/mm3    Immature Grans, Absolute 0.03 0.00 - 0.05 10*3/mm3    nRBC 0.0 0.0 - 0.2 /100 WBC   Vitamin D 25 Hydroxy    Specimen: Blood   Result Value Ref Range    25 Hydroxy, Vitamin D 26.9 ng/ml   T3    Specimen: Blood   Result Value Ref Range    T3, Total 112.0 80.0 - 200.0 ng/dl   TSH    Specimen: Blood   Result Value Ref Range    TSH 6.710 (H) 0.270 - 4.200 uIU/mL   T4, Free    Specimen: Blood   Result Value Ref Range    Free T4 1.39 0.93 - 1.70 ng/dL   Iron    Specimen: Blood   Result Value Ref Range    Iron 36 (L) 37 - 145 mcg/dL   Vitamin B12    Specimen: Blood   Result Value Ref Range    Vitamin B-12 486 211 - 946 pg/mL   Comprehensive Metabolic Panel    Specimen: Blood   Result Value Ref Range    Glucose 82 65 - 99 mg/dL    BUN 14 6 - 20 mg/dL    Creatinine 0.78 0.57 - 1.00 mg/dL    Sodium 138 136 - 145 mmol/L    Potassium 4.4 3.5 - 5.2 mmol/L    Chloride 101 98 - 107 mmol/L    CO2 27.0 22.0 - 29.0  mmol/L    Calcium 9.8 8.6 - 10.5 mg/dL    Total Protein 6.5 6.0 - 8.5 g/dL    Albumin 4.50 3.50 - 5.20 g/dL    ALT (SGPT) 15 1 - 33 U/L    AST (SGOT) 19 1 - 32 U/L    Alkaline Phosphatase 77 39 - 117 U/L    Total Bilirubin 0.3 0.0 - 1.2 mg/dL    eGFR Non African Amer 88 >60 mL/min/1.73    Globulin 2.0 gm/dL    A/G Ratio 2.3 g/dL    BUN/Creatinine Ratio 17.9 7.0 - 25.0    Anion Gap 10.0 5.0 - 15.0 mmol/L     *Note: Due to a large number of results and/or encounters for the requested time period, some results have not been displayed. A complete set of results can be found in Results Review.

## 2022-11-18 ENCOUNTER — LAB (OUTPATIENT)
Dept: LAB | Facility: HOSPITAL | Age: 27
End: 2022-11-18

## 2022-11-18 ENCOUNTER — OFFICE VISIT (OUTPATIENT)
Dept: FAMILY MEDICINE CLINIC | Facility: CLINIC | Age: 27
End: 2022-11-18

## 2022-11-18 VITALS
WEIGHT: 132 LBS | DIASTOLIC BLOOD PRESSURE: 80 MMHG | HEIGHT: 67 IN | HEART RATE: 88 BPM | BODY MASS INDEX: 20.72 KG/M2 | SYSTOLIC BLOOD PRESSURE: 114 MMHG | OXYGEN SATURATION: 97 %

## 2022-11-18 DIAGNOSIS — F51.01 PRIMARY INSOMNIA: Primary | ICD-10-CM

## 2022-11-18 DIAGNOSIS — E03.8 HYPOTHYROIDISM DUE TO HASHIMOTO'S THYROIDITIS: ICD-10-CM

## 2022-11-18 DIAGNOSIS — E06.3 HYPOTHYROIDISM DUE TO HASHIMOTO'S THYROIDITIS: ICD-10-CM

## 2022-11-18 PROCEDURE — 84439 ASSAY OF FREE THYROXINE: CPT | Performed by: NURSE PRACTITIONER

## 2022-11-18 PROCEDURE — 36415 COLL VENOUS BLD VENIPUNCTURE: CPT | Performed by: NURSE PRACTITIONER

## 2022-11-18 PROCEDURE — 99213 OFFICE O/P EST LOW 20 MIN: CPT | Performed by: NURSE PRACTITIONER

## 2022-11-18 PROCEDURE — 82607 VITAMIN B-12: CPT | Performed by: NURSE PRACTITIONER

## 2022-11-18 PROCEDURE — 82306 VITAMIN D 25 HYDROXY: CPT | Performed by: NURSE PRACTITIONER

## 2022-11-18 PROCEDURE — 80050 GENERAL HEALTH PANEL: CPT | Performed by: NURSE PRACTITIONER

## 2022-11-18 PROCEDURE — 83540 ASSAY OF IRON: CPT | Performed by: NURSE PRACTITIONER

## 2022-11-18 PROCEDURE — 84480 ASSAY TRIIODOTHYRONINE (T3): CPT | Performed by: NURSE PRACTITIONER

## 2022-11-18 RX ORDER — HYDROXYZINE HYDROCHLORIDE 10 MG/1
TABLET, FILM COATED ORAL
Qty: 60 TABLET | Refills: 11 | Status: SHIPPED | OUTPATIENT
Start: 2022-11-18

## 2022-11-18 RX ORDER — RAMELTEON 8 MG/1
TABLET ORAL
COMMUNITY
Start: 2022-10-20 | End: 2023-02-20

## 2022-11-18 RX ORDER — LISDEXAMFETAMINE DIMESYLATE 20 MG/1
CAPSULE ORAL
COMMUNITY
Start: 2022-10-22

## 2022-11-18 NOTE — PROGRESS NOTES
Chief Complaint   Patient presents with   • Hypothyroidism   • not sleeping     Subjective   Lala Padilla is a 27 y.o. female.           Hypothyroidism  This is a chronic problem. The current episode started more than 1 year ago. The problem has been waxing and waning. Associated symptoms include fatigue. Pertinent negatives include no abdominal pain, anorexia, arthralgias, change in bowel habit, chest pain, congestion, coughing, headaches, joint swelling, myalgias, nausea, neck pain, numbness, sore throat, swollen glands, urinary symptoms, vertigo, visual change or weakness. The symptoms are aggravated by stress. She has tried rest (synthroid ) for the symptoms. The treatment provided mild relief.   Insomnia  This is a recurrent problem. The current episode started 1 to 4 weeks ago. The problem occurs every several days. The problem has been gradually worsening. Associated symptoms include fatigue. Pertinent negatives include no abdominal pain, anorexia, arthralgias, change in bowel habit, chest pain, congestion, coughing, headaches, joint swelling, myalgias, nausea, neck pain, numbness, sore throat, swollen glands, urinary symptoms, vertigo, visual change or weakness. The treatment provided mild relief.        The following portions of the patient's history were reviewed and updated as appropriate: allergies, current medications, past social history and problem list.    Review of Systems   Constitutional: Positive for activity change and fatigue. Negative for appetite change and unexpected weight change.   HENT: Negative.  Negative for congestion, dental problem, drooling, ear discharge, ear pain, facial swelling, hearing loss, mouth sores, nosebleeds, postnasal drip, rhinorrhea, sinus pressure, sinus pain, sneezing, sore throat, tinnitus, trouble swallowing and voice change.         Reports trouble swallowing on and off    Eyes: Negative.  Negative for photophobia, pain, discharge, redness,  "itching and visual disturbance.   Respiratory: Negative.  Negative for apnea, cough, choking, chest tightness, shortness of breath, wheezing and stridor.    Cardiovascular: Negative.  Negative for chest pain, palpitations and leg swelling.   Gastrointestinal: Negative.  Negative for abdominal distention, abdominal pain, anal bleeding, anorexia, blood in stool, change in bowel habit, diarrhea, nausea and rectal pain.   Endocrine: Negative.  Negative for polydipsia, polyphagia and polyuria.   Genitourinary: Negative.  Negative for difficulty urinating and genital sores.   Musculoskeletal: Negative.  Negative for arthralgias, joint swelling, myalgias, neck pain and neck stiffness.   Skin: Negative for color change, pallor and wound.        Reports color changes extremities    Allergic/Immunologic: Negative.  Negative for environmental allergies, food allergies and immunocompromised state.   Neurological: Negative.  Negative for vertigo, tremors, seizures, syncope, facial asymmetry, speech difficulty, weakness, light-headedness, numbness and headaches.   Hematological: Negative.  Negative for adenopathy. Does not bruise/bleed easily.   Psychiatric/Behavioral: Positive for decreased concentration and sleep disturbance. Negative for agitation, behavioral problems, confusion, dysphoric mood, hallucinations, self-injury and suicidal ideas. The patient is nervous/anxious and has insomnia. The patient is not hyperactive.         Currently treating for add   Bipolar        Objective   /80   Pulse 88   Ht 170.2 cm (67\")   Wt 59.9 kg (132 lb)   SpO2 97%   BMI 20.67 kg/m²   Physical Exam  Vitals and nursing note reviewed.   Constitutional:       General: She is not in acute distress.     Appearance: Normal appearance. She is not ill-appearing or diaphoretic.   HENT:      Head: Normocephalic and atraumatic.      Right Ear: Tympanic membrane normal. There is no impacted cerumen.      Left Ear: Tympanic membrane normal. " There is no impacted cerumen.      Nose: Nose normal. No congestion or rhinorrhea.      Mouth/Throat:      Mouth: Mucous membranes are moist.      Pharynx: No oropharyngeal exudate or posterior oropharyngeal erythema.   Eyes:      Extraocular Movements: Extraocular movements intact.      Pupils: Pupils are equal, round, and reactive to light.   Neck:      Vascular: No carotid bruit.   Cardiovascular:      Rate and Rhythm: Normal rate and regular rhythm.      Pulses: Normal pulses.      Heart sounds: No murmur heard.    No friction rub. No gallop.   Pulmonary:      Effort: Pulmonary effort is normal. No respiratory distress.      Breath sounds: No stridor. No wheezing, rhonchi or rales.   Chest:      Chest wall: No tenderness.   Abdominal:      General: Abdomen is flat. There is no distension.      Palpations: There is no mass.      Tenderness: There is no abdominal tenderness. There is no guarding or rebound.      Hernia: No hernia is present.   Musculoskeletal:         General: No swelling, tenderness, deformity or signs of injury. Normal range of motion.      Cervical back: Normal range of motion. No rigidity or tenderness.      Right lower leg: No edema.      Left lower leg: No edema.   Lymphadenopathy:      Cervical: No cervical adenopathy.   Skin:     General: Skin is warm.      Coloration: Skin is not jaundiced or pale.      Findings: No bruising, erythema, lesion or rash.      Comments: Discolored toes and fingers probable raynauds        Neurological:      General: No focal deficit present.      Mental Status: She is alert and oriented to person, place, and time.      Cranial Nerves: No cranial nerve deficit.      Sensory: No sensory deficit.      Motor: No weakness.      Coordination: Coordination normal.      Gait: Gait normal.      Deep Tendon Reflexes: Reflexes normal.   Psychiatric:         Mood and Affect: Mood normal.         Behavior: Behavior normal.               Assessment & Plan     Problems  Addressed this Visit        Endocrine and Metabolic    Hypothyroidism    Relevant Orders    CBC & Differential    Comprehensive Metabolic Panel    Iron    TSH    T3    T4, Free    Vitamin B12    Vitamin D,25-Hydroxy       Sleep    Primary insomnia - Primary    Relevant Orders    CBC & Differential    Comprehensive Metabolic Panel    Iron    TSH    T3    T4, Free    Vitamin B12    Vitamin D,25-Hydroxy   Diagnoses       Codes Comments    Primary insomnia    -  Primary ICD-10-CM: F51.01  ICD-9-CM: 307.42     Hypothyroidism due to Hashimoto's thyroiditis     ICD-10-CM: E03.8, E06.3  ICD-9-CM: 244.8, 245.2            New Medications Ordered This Visit   Medications   • hydrOXYzine (ATARAX) 10 MG tablet     Si-2 at night     Dispense:  60 tablet     Refill:  11     Current Outpatient Medications on File Prior to Visit   Medication Sig Dispense Refill   • EPINEPHrine (EPIPEN) 0.3 MG/0.3ML solution auto-injector injection INJECT CONTENTS OF 1 PEN AS NEEDED FOR ALLERGIC REACTION     • lamoTRIgine (LaMICtal) 100 MG tablet Take 100 mg by mouth 2 (Two) Times a Day. 1/2 tab twice daily     • levothyroxine (Synthroid) 125 MCG tablet Take 1 tablet by mouth Daily. 90 tablet 1   • ramelteon (ROZEREM) 8 MG tablet TAKE 1 TABLET BY MOUTH ONCE DAILY AT BEDTIME 30 MINUTES TO 1 HOUR BEFORE BEDTIME     • Vyvanse 20 MG capsule TAKE 1 CAPSULE BY MOUTH ONCE DAILY IN THE MORNING AS DIRECTED     • [DISCONTINUED] zolpidem (AMBIEN) 10 MG tablet Take 5-10 mg by mouth At Night As Needed.       No current facility-administered medications on file prior to visit.       20 minutes   Follow Up   Return in about 3 months (around 2023).        It's not just what you eat, but when you eat  Eat breakfast, and eat smaller meals throughout the day. A healthy breakfast can jumpstart your metabolism, while eating small, healthy meals (rather than the standard three large meals) keeps your energy up.   Avoid eating at night. Try to eat dinner earlier  and fast for 14-16 hours until breakfast the next morning. Studies suggest that eating only when you’re most active and giving your digestive system a long break each day may help to regulate weight.     Labs today as directed, follow up if worsen -otherwise follow up with mental health and endocrinology

## 2022-11-19 LAB
25(OH)D3 SERPL-MCNC: 28.9 NG/ML (ref 30–100)
ALBUMIN SERPL-MCNC: 4.5 G/DL (ref 3.5–5.2)
ALBUMIN/GLOB SERPL: 2 G/DL
ALP SERPL-CCNC: 86 U/L (ref 39–117)
ALT SERPL W P-5'-P-CCNC: 9 U/L (ref 1–33)
ANION GAP SERPL CALCULATED.3IONS-SCNC: 11 MMOL/L (ref 5–15)
AST SERPL-CCNC: 14 U/L (ref 1–32)
BASOPHILS # BLD AUTO: 0.03 10*3/MM3 (ref 0–0.2)
BASOPHILS NFR BLD AUTO: 0.4 % (ref 0–1.5)
BILIRUB SERPL-MCNC: 0.3 MG/DL (ref 0–1.2)
BUN SERPL-MCNC: 8 MG/DL (ref 6–20)
BUN/CREAT SERPL: 8 (ref 7–25)
CALCIUM SPEC-SCNC: 9.8 MG/DL (ref 8.6–10.5)
CHLORIDE SERPL-SCNC: 103 MMOL/L (ref 98–107)
CO2 SERPL-SCNC: 26 MMOL/L (ref 22–29)
CREAT SERPL-MCNC: 1 MG/DL (ref 0.57–1)
DEPRECATED RDW RBC AUTO: 37.2 FL (ref 37–54)
EGFRCR SERPLBLD CKD-EPI 2021: 78.9 ML/MIN/1.73
EOSINOPHIL # BLD AUTO: 0.26 10*3/MM3 (ref 0–0.4)
EOSINOPHIL NFR BLD AUTO: 3.1 % (ref 0.3–6.2)
ERYTHROCYTE [DISTWIDTH] IN BLOOD BY AUTOMATED COUNT: 13 % (ref 12.3–15.4)
GLOBULIN UR ELPH-MCNC: 2.2 GM/DL
GLUCOSE SERPL-MCNC: 82 MG/DL (ref 65–99)
HCT VFR BLD AUTO: 38.8 % (ref 34–46.6)
HGB BLD-MCNC: 13.4 G/DL (ref 12–15.9)
IMM GRANULOCYTES # BLD AUTO: 0.03 10*3/MM3 (ref 0–0.05)
IMM GRANULOCYTES NFR BLD AUTO: 0.4 % (ref 0–0.5)
IRON 24H UR-MRATE: 58 MCG/DL (ref 37–145)
LYMPHOCYTES # BLD AUTO: 1.44 10*3/MM3 (ref 0.7–3.1)
LYMPHOCYTES NFR BLD AUTO: 17.4 % (ref 19.6–45.3)
MCH RBC QN AUTO: 27.4 PG (ref 26.6–33)
MCHC RBC AUTO-ENTMCNC: 34.5 G/DL (ref 31.5–35.7)
MCV RBC AUTO: 79.3 FL (ref 79–97)
MONOCYTES # BLD AUTO: 0.53 10*3/MM3 (ref 0.1–0.9)
MONOCYTES NFR BLD AUTO: 6.4 % (ref 5–12)
NEUTROPHILS NFR BLD AUTO: 5.97 10*3/MM3 (ref 1.7–7)
NEUTROPHILS NFR BLD AUTO: 72.3 % (ref 42.7–76)
NRBC BLD AUTO-RTO: 0 /100 WBC (ref 0–0.2)
PLATELET # BLD AUTO: 314 10*3/MM3 (ref 140–450)
PMV BLD AUTO: 9.4 FL (ref 6–12)
POTASSIUM SERPL-SCNC: 4.1 MMOL/L (ref 3.5–5.2)
PROT SERPL-MCNC: 6.7 G/DL (ref 6–8.5)
RBC # BLD AUTO: 4.89 10*6/MM3 (ref 3.77–5.28)
SODIUM SERPL-SCNC: 140 MMOL/L (ref 136–145)
T3 SERPL-MCNC: 113 NG/DL (ref 80–200)
T4 FREE SERPL-MCNC: 1.71 NG/DL (ref 0.93–1.7)
TSH SERPL DL<=0.05 MIU/L-ACNC: 0.19 UIU/ML (ref 0.27–4.2)
VIT B12 BLD-MCNC: 502 PG/ML (ref 211–946)
WBC NRBC COR # BLD: 8.26 10*3/MM3 (ref 3.4–10.8)

## 2023-02-20 ENCOUNTER — OFFICE VISIT (OUTPATIENT)
Dept: FAMILY MEDICINE CLINIC | Facility: CLINIC | Age: 28
End: 2023-02-20
Payer: COMMERCIAL

## 2023-02-20 ENCOUNTER — LAB (OUTPATIENT)
Dept: LAB | Facility: HOSPITAL | Age: 28
End: 2023-02-20
Payer: COMMERCIAL

## 2023-02-20 VITALS
HEART RATE: 89 BPM | WEIGHT: 135 LBS | DIASTOLIC BLOOD PRESSURE: 70 MMHG | SYSTOLIC BLOOD PRESSURE: 100 MMHG | HEIGHT: 67 IN | OXYGEN SATURATION: 100 % | BODY MASS INDEX: 21.19 KG/M2

## 2023-02-20 DIAGNOSIS — E03.9 HYPOTHYROIDISM (ACQUIRED): Primary | ICD-10-CM

## 2023-02-20 DIAGNOSIS — F51.01 PRIMARY INSOMNIA: ICD-10-CM

## 2023-02-20 PROCEDURE — 82306 VITAMIN D 25 HYDROXY: CPT | Performed by: NURSE PRACTITIONER

## 2023-02-20 PROCEDURE — 99213 OFFICE O/P EST LOW 20 MIN: CPT | Performed by: NURSE PRACTITIONER

## 2023-02-20 PROCEDURE — 82607 VITAMIN B-12: CPT | Performed by: NURSE PRACTITIONER

## 2023-02-20 PROCEDURE — 36415 COLL VENOUS BLD VENIPUNCTURE: CPT | Performed by: NURSE PRACTITIONER

## 2023-02-20 PROCEDURE — 84481 FREE ASSAY (FT-3): CPT | Performed by: NURSE PRACTITIONER

## 2023-02-20 PROCEDURE — 84443 ASSAY THYROID STIM HORMONE: CPT | Performed by: NURSE PRACTITIONER

## 2023-02-20 PROCEDURE — 84439 ASSAY OF FREE THYROXINE: CPT | Performed by: NURSE PRACTITIONER

## 2023-02-20 RX ORDER — QUETIAPINE FUMARATE 50 MG/1
50 TABLET, FILM COATED ORAL DAILY
COMMUNITY
Start: 2023-01-31

## 2023-02-21 LAB
25(OH)D3 SERPL-MCNC: 16.3 NG/ML (ref 30–100)
T3FREE SERPL-MCNC: 2.21 PG/ML (ref 2–4.4)
T4 FREE SERPL-MCNC: 0.47 NG/DL (ref 0.93–1.7)
TSH SERPL DL<=0.05 MIU/L-ACNC: 43 UIU/ML (ref 0.27–4.2)
VIT B12 BLD-MCNC: 556 PG/ML (ref 211–946)

## 2023-02-22 RX ORDER — LEVOTHYROXINE SODIUM 0.12 MG/1
125 TABLET ORAL DAILY
Qty: 90 TABLET | Refills: 3 | Status: SHIPPED | OUTPATIENT
Start: 2023-02-22

## 2023-03-02 ENCOUNTER — TELEMEDICINE (OUTPATIENT)
Dept: ENDOCRINOLOGY | Facility: CLINIC | Age: 28
End: 2023-03-02
Payer: COMMERCIAL

## 2023-03-02 DIAGNOSIS — E55.9 VITAMIN D DEFICIENCY: ICD-10-CM

## 2023-03-02 DIAGNOSIS — R53.83 OTHER FATIGUE: ICD-10-CM

## 2023-03-02 DIAGNOSIS — E06.3 HYPOTHYROIDISM DUE TO HASHIMOTO'S THYROIDITIS: Primary | ICD-10-CM

## 2023-03-02 DIAGNOSIS — E03.8 HYPOTHYROIDISM DUE TO HASHIMOTO'S THYROIDITIS: Primary | ICD-10-CM

## 2023-03-02 PROCEDURE — 99214 OFFICE O/P EST MOD 30 MIN: CPT | Performed by: NURSE PRACTITIONER

## 2023-03-02 NOTE — PROGRESS NOTES
Chief Complaint  Thyroid Problem    Subjective          Lala Padilla presents to [facility]  History of Present Illness         You have chosen to receive care through a telehealth visit.  Do you consent to use a video/audio connection for your medical care today? Yes        TELEHEALTH VIDEO VISIT     This a video visit due to [facility] current guidelines for social distancing due to the COVID 19 pandemic      Mode of Visit: Video  Location of patient: home  You have chosen to receive care through a telehealth visit.  Does the patient consent to use a video/audio connection for your medical care today? Yes  The visit included audio and video interaction. No technical issues occurred during this visit.       28 year old female presents for follow up         Reason hypothyroidism      duration since 2015         Timing constant         Quality not controlled  She has been out of medication         Severity - mild     Context - diagnosed with routine lab work      Location/size - thyroid      THYROID ULTRASOUND     HISTORY: Hypothyroidism. Acquired hypothyroidism.     The thyroid was scanned with a small parts transducer.     COMPARISON: None     FINDINGS:  The right lobe measures 4.23 cm in length by 1.13 cm AP by 1.23  cm transverse.  The left lobe measures 4.30 cm in length by 1.28 cm AP by 1.14 cm  transverse.  Unremarkable isthmus.     Thyroid of inhomogeneous echotexture.  No focal nodule or mass.     IMPRESSION:  CONCLUSION:  Heterogeneous thyroid which could represent thyroiditis.  No focal nodule or mass.     [id number]     Electronically signed by:  Jm Lee MD  7/26/2019 1:35 PM CDT  Workstation: [id number]                   Review of Systems - General ROS: positive for  - fatigue        Objective   Vital Signs:   There were no vitals taken for this visit.    Physical Exam  Neurological:      General: No focal deficit present.      Mental Status: She is alert.    Psychiatric:         Mood and Affect: Mood normal.         Thought Content: Thought content normal.         Judgment: Judgment normal.        Result Review :   The following data was reviewed by: MILLY Alcala on 06/02/2022:  Common labs    Common Labs 6/2/22 6/2/22 11/18/22 11/18/22    0855 [id number] 1609 1609   Glucose  89  82   BUN  10  8   Creatinine  0.96  1.00   Sodium  141  140   Potassium  4.6  4.1   Chloride  105  103   Calcium  10.3  9.8   Albumin  4.40  4.50   Total Bilirubin  0.2  0.3   Alkaline Phosphatase  89  86   AST (SGOT)  27  14   ALT (SGPT)  22  9   WBC 8.24  8.26    Hemoglobin 14.0  13.4    Hematocrit 41.0  38.8    Platelets 244  314                        Assessment and Plan    Diagnoses and all orders for this visit:    1. Hypothyroidism due to Hashimoto's thyroiditis (Primary)  -     TSH    2. Vitamin D deficiency    3. Other fatigue             Hypothyroidism due to hashimoto's               Taking Levothyroxine 125 mcg daily --- she was not taking medication for over 2 months     Restart levothyroxine 125 mcg daily     Reassess labs in 6 weeks            Lab Results   Component Value Date    TSH 43.000 (H) 02/20/2023         Fatigue     Restart levothyroxine         Bone Health     Vitamin d def..        Taking MVI daily with vitamin d         Component      Latest Ref Rng & Units 4/27/2021   25 Hydroxy, Vitamin D      ng/ml 31.5                          Follow Up   No follow-ups on file.  Patient was given instructions and counseling regarding her condition or for health maintenance advice. Please see specific information pulled into the AVS if appropriate.         This document has been electronically signed by MILLY Alcala on March 2, 2023 08:36 CST.

## 2023-03-06 ENCOUNTER — OFFICE VISIT (OUTPATIENT)
Dept: GASTROENTEROLOGY | Facility: CLINIC | Age: 28
End: 2023-03-06
Payer: COMMERCIAL

## 2023-03-06 VITALS
WEIGHT: 135 LBS | HEIGHT: 67 IN | HEART RATE: 80 BPM | SYSTOLIC BLOOD PRESSURE: 102 MMHG | BODY MASS INDEX: 21.19 KG/M2 | DIASTOLIC BLOOD PRESSURE: 62 MMHG

## 2023-03-06 DIAGNOSIS — K52.832 LYMPHOCYTIC COLITIS: ICD-10-CM

## 2023-03-06 DIAGNOSIS — K58.0 IRRITABLE BOWEL SYNDROME WITH DIARRHEA: Primary | ICD-10-CM

## 2023-03-06 PROCEDURE — 99213 OFFICE O/P EST LOW 20 MIN: CPT | Performed by: NURSE PRACTITIONER

## 2023-03-06 NOTE — PROGRESS NOTES
"                                                                                                                  Chief Complaint   Patient presents with   • Diarrhea       Subjective    Lala Padilla is a 28 y.o. female. she is here today for follow-up.                                                                  Assessment & Plan                                     1. Irritable bowel syndrome with diarrhea    2. Lymphocytic colitis      Plan; symptoms well controlled without lifestyle modifications follow-up in 1 year discussed importance of sooner follow-up if diarrhea, blood in stool or flare of any GI symptoms occur.     Follow-up: Return in about 1 year (around 3/6/2024) for Recheck.     HPI  28-year-old female presents for recheck regarding irritable bowel syndrome diarrhea predominant.  States over the last 6 months she has done very well bowel movements are typically 1 time per day normal consistency with no melena or hematochezia reports occasional mucus in stool.  She was treated for lymphocytic colitis with course of Entocort symptoms recurred took 2-week course of rifaximin and bowel movements have regulated since that time.  Colonoscopy June 2022.       Review of Systems  Review of Systems   Constitutional: Negative for activity change, appetite change, chills, diaphoresis, fatigue, fever and unexpected weight change.   HENT: Negative for sore throat and trouble swallowing.    Respiratory: Negative for shortness of breath.    Gastrointestinal: Negative for abdominal distention, abdominal pain (rare ), anal bleeding, blood in stool, constipation, diarrhea, nausea, rectal pain and vomiting.   Musculoskeletal: Negative for arthralgias.   Skin: Negative for pallor.   Neurological: Negative for light-headedness.       /62 (BP Location: Left arm)   Pulse 80   Ht 170.2 cm (67\")   Wt 61.2 kg (135 lb)   BMI 21.14 kg/m²     Objective      Physical Exam  Constitutional:       General: " She is not in acute distress.     Appearance: Normal appearance. She is normal weight. She is not ill-appearing.   HENT:      Head: Normocephalic and atraumatic.   Pulmonary:      Effort: Pulmonary effort is normal.   Abdominal:      General: Abdomen is flat. Bowel sounds are normal. There is no distension.      Palpations: Abdomen is soft. There is no mass.      Tenderness: There is no abdominal tenderness.   Neurological:      Mental Status: She is alert.               The following portions of the patient's history were reviewed and updated as appropriate:   Past Medical History:   Diagnosis Date   • Acute tonsillitis    • ADHD    • Bipolar 1 disorder (HCC)    • Bronchitis    • Cellulitis     History of abscess, near left axilla      • Contusion of knee    • Depressive disorder    • Diarrhea    • Disease of thyroid gland    • Dysmenorrhea 09/28/2017   • Dysuria    • Encounter for gynecological examination (general) (routine) without abnormal findings    • Encounter for removal of sutures    • Encounter for surveillance of contraceptive pills    • Folliculitis    • Fracture of phalanx of hand     Left thumb      • Gastroenteritis    • Generalized anxiety disorder    • Hypothyroidism, unspecified    • Irregular periods    • Kidney stone    • Knee pain    • Lipoma    • Ovarian cyst    • Pain in joint, ankle and foot    • Pain in wrist    • Panic disorder    • Ruptured cyst of ovary    • Sports physical    • Sprain of ankle    • Strain of neck muscle    • Tick bite    • Upper respiratory infection      Past Surgical History:   Procedure Laterality Date   • ADENOIDECTOMY     • COLONOSCOPY N/A 2/28/2022    Procedure: COLONOSCOPY;  Surgeon: Polo Gil MD;  Location: [facility] ENDOSCOPY;  Service: Gastroenterology;  Laterality: N/A;   • COLONOSCOPY N/A 6/24/2022    Procedure: COLONOSCOPY;  Surgeon: Polo Gil MD;  Location: [facility] ENDOSCOPY;  Service: Gastroenterology;  Laterality: N/A;   • ENDOSCOPY N/A  2/28/2022    Procedure: ESOPHAGOGASTRODUODENOSCOPY;  Surgeon: Polo Gil MD;  Location: [facility] ENDOSCOPY;  Service: Gastroenterology;  Laterality: N/A;   • INCISION AND DRAINAGE ABSCESS  02/04/2009    Left axillary abscess   • INJECTION OF MEDICATION  05/31/2014    Toradol(1)   • INJECTION OF MEDICATION  05/31/2014    Zofran(1)   • KIDNEY STONE SURGERY     • TONSILLECTOMY       Family History   Problem Relation Age of Onset   • Cancer Other    • Diabetes Other    • Heart disease Other    • Hypertension Other    • Thyroid disease Other      OB History    No obstetric history on file.       No Known Allergies  Social History     Socioeconomic History   • Marital status: Single   Tobacco Use   • Smoking status: Never   • Smokeless tobacco: Never   Vaping Use   • Vaping Use: Never used   Substance and Sexual Activity   • Alcohol use: No   • Drug use: No   • Sexual activity: Not Currently     Birth control/protection: None     Current Medications:  Prior to Admission medications    Medication Sig Start Date End Date Taking? Authorizing Provider   EPINEPHrine (EPIPEN) 0.3 MG/0.3ML solution auto-injector injection INJECT CONTENTS OF 1 PEN AS NEEDED FOR ALLERGIC REACTION 3/9/22   Kole Cleaning MD   hydrOXYzine (ATARAX) 10 MG tablet 1-2 at night 11/18/22   Paola Snyder APRN   lamoTRIgine (LaMICtal) 100 MG tablet Take 100 mg by mouth 2 (Two) Times a Day. 1/2 tab twice daily 5/19/22   Kole Cleaning MD   levothyroxine (Synthroid) 125 MCG tablet Take 1 tablet by mouth Daily. 2/22/23   Paola Snyder APRN   QUEtiapine (SEROquel) 50 MG tablet Take 50 mg by mouth Daily. 1/31/23   Kole Cleaning MD   Vyvanse 20 MG capsule TAKE 1 CAPSULE BY MOUTH ONCE DAILY IN THE MORNING AS DIRECTED 10/22/22   Kole Cleaning MD     Orders placed during this encounter include:  No orders of the defined types were placed in this encounter.    * Surgery not found *  No orders of the defined types  were placed in this encounter.        Review and/or summary of lab tests, radiology, procedures, medications. Review and summary of old records and obtaining of history. The risks and benefits of my recommendations, as well as other treatment options were discussed . Any questions/concerned were answered. Patient voiced understanding and agreement.          This document has been electronically signed by MILLY Torres on March 6, 2023 10:12 CST                                               Results for orders placed or performed in visit on 02/20/23   Vitamin D,25-Hydroxy    Specimen: Blood   Result Value Ref Range    25 Hydroxy, Vitamin D 16.3 (L) 30.0 - 100.0 ng/ml   T3, free    Specimen: Blood   Result Value Ref Range    T3, Free 2.21 2.00 - 4.40 pg/mL   TSH    Specimen: Blood   Result Value Ref Range    TSH 43.000 (H) 0.270 - 4.200 uIU/mL   T4, free    Specimen: Blood   Result Value Ref Range    Free T4 0.47 (L) 0.93 - 1.70 ng/dL   Vitamin B12    Specimen: Blood   Result Value Ref Range    Vitamin B-12 556 211 - 946 pg/mL   Results for orders placed or performed in visit on 11/18/22   CBC Auto Differential    Specimen: Blood   Result Value Ref Range    WBC 8.26 3.40 - 10.80 10*3/mm3    RBC 4.89 3.77 - 5.28 10*6/mm3    Hemoglobin 13.4 12.0 - 15.9 g/dL    Hematocrit 38.8 34.0 - 46.6 %    MCV 79.3 79.0 - 97.0 fL    MCH 27.4 26.6 - 33.0 pg    MCHC 34.5 31.5 - 35.7 g/dL    RDW 13.0 12.3 - 15.4 %    RDW-SD 37.2 37.0 - 54.0 fl    MPV 9.4 6.0 - 12.0 fL    Platelets 314 140 - 450 10*3/mm3    Neutrophil % 72.3 42.7 - 76.0 %    Lymphocyte % 17.4 (L) 19.6 - 45.3 %    Monocyte % 6.4 5.0 - 12.0 %    Eosinophil % 3.1 0.3 - 6.2 %    Basophil % 0.4 0.0 - 1.5 %    Immature Grans % 0.4 0.0 - 0.5 %    Neutrophils, Absolute 5.97 1.70 - 7.00 10*3/mm3    Lymphocytes, Absolute 1.44 0.70 - 3.10 10*3/mm3    Monocytes, Absolute 0.53 0.10 - 0.90 10*3/mm3    Eosinophils, Absolute 0.26 0.00 - 0.40 10*3/mm3    Basophils, Absolute 0.03  0.00 - 0.20 10*3/mm3    Immature Grans, Absolute 0.03 0.00 - 0.05 10*3/mm3    nRBC 0.0 0.0 - 0.2 /100 WBC   Vitamin D,25-Hydroxy    Specimen: Blood   Result Value Ref Range    25 Hydroxy, Vitamin D 28.9 (L) 30.0 - 100.0 ng/ml   T3    Specimen: Blood   Result Value Ref Range    T3, Total 113.0 80.0 - 200.0 ng/dl   TSH    Specimen: Blood   Result Value Ref Range    TSH 0.193 (L) 0.270 - 4.200 uIU/mL   T4, Free    Specimen: Blood   Result Value Ref Range    Free T4 1.71 (H) 0.93 - 1.70 ng/dL   Iron    Specimen: Blood   Result Value Ref Range    Iron 58 37 - 145 mcg/dL   Vitamin B12    Specimen: Blood   Result Value Ref Range    Vitamin B-12 502 211 - 946 pg/mL   Comprehensive Metabolic Panel    Specimen: Blood   Result Value Ref Range    Glucose 82 65 - 99 mg/dL    BUN 8 6 - 20 mg/dL    Creatinine 1.00 0.57 - 1.00 mg/dL    Sodium 140 136 - 145 mmol/L    Potassium 4.1 3.5 - 5.2 mmol/L    Chloride 103 98 - 107 mmol/L    CO2 26.0 22.0 - 29.0 mmol/L    Calcium 9.8 8.6 - 10.5 mg/dL    Total Protein 6.7 6.0 - 8.5 g/dL    Albumin 4.50 3.50 - 5.20 g/dL    ALT (SGPT) 9 1 - 33 U/L    AST (SGOT) 14 1 - 32 U/L    Alkaline Phosphatase 86 39 - 117 U/L    Total Bilirubin 0.3 0.0 - 1.2 mg/dL    Globulin 2.2 gm/dL    A/G Ratio 2.0 g/dL    BUN/Creatinine Ratio 8.0 7.0 - 25.0    Anion Gap 11.0 5.0 - 15.0 mmol/L    eGFR 78.9 >60.0 mL/min/1.73   Results for orders placed or performed in visit on 08/05/22   TSH    Specimen: Blood   Result Value Ref Range    TSH 24.500 (H) 0.270 - 4.200 uIU/mL   Results for orders placed or performed during the hospital encounter of 06/24/22   TISSUE EXAM, [facility] ([facility])    Specimen: A: Small Intestine, Ileum; Tissue    B: Large Intestine; Tissue   Result Value Ref Range    Reference Lab Report       [facility]  [address] [place] [address]  Phone: [phone] or [phone]  Fax: [phone]  Sebastian Alcantar M.D., Medical Director    PATIENT NAME               "             LABORATORY NO.  [id number]  [address].           [id number]  [id number]                         AGE              SEX  [facility]           CLIENT REF #  [facility]           27      1995  F    [id number]   [id number]    [place]                       REQUESTING M.D.     ATTENDING M.D.     COPY TO.  [address] [place] [address]                 [place] [address]             DATE COLLECTED      DATE RECEIVED      DATE REPORTED  2022    DIAGNOSIS:  A.   TERMINAL ILEUM BIOPSY:  No significant histologic abnormality  B.   COLON, BIOPSY:  No significant histologic abnormality    JBS/pah    CLINICAL HISTORY:  Lymphocytic colitis    SPECIMENS RECEIVED:  A.  TERMINAL ILEUM  BIOPSY  B.  COLON, BIOPSY    MICROSCOPIC DESCRIPTION:  Tissue blocks are prepared and slides are examined microscopically on all  specimens. See diagnosis for details.    Professional interpretation rendered by Blade Jay M.D. at [email or web address]  [employer], [address] [place] [address] , [place] [address].    GROSS DESCRIPTION:  A.  Specimen is received in 1 formalin filled container labeled \"TI\" and  consists of 2 portions of tan soft tissue measuring 0.5 x 0.5 x 0.2 cm in  aggregate.  Submitted entirely in 1 cassette.  MTH  B.  Specimen is received in 1 formalin filled container labeled \"colonic  mucosa\" and consists of multiple portions of tan soft tissue measuring 0.4  x 0.4 x 0.1 cm in aggregate.  Submitted entirely in 1 cassette.    REVIEWED, DIAGNOSED AND ELECTRONICALLY  SIGNED BY:    Blade Jay M.D.  CPT CODES:  88305x2     Results for orders placed or performed in visit on 22   CBC Auto Differential    Specimen: Blood   Result Value Ref Range    WBC 8.24 3.40 - 10.80 10*3/mm3    RBC 4.93 3.77 - 5.28 10*6/mm3    Hemoglobin 14.0 12.0 - 15.9 g/dL    Hematocrit 41.0 34.0 - 46.6 %    MCV 83.2 79.0 - 97.0 fL    MCH 28.4 26.6 - 33.0 " pg    MCHC 34.1 31.5 - 35.7 g/dL    RDW 13.7 12.3 - 15.4 %    RDW-SD 41.4 37.0 - 54.0 fl    MPV 8.7 6.0 - 12.0 fL    Platelets 244 140 - 450 10*3/mm3    Neutrophil % 74.5 42.7 - 76.0 %    Lymphocyte % 12.1 (L) 19.6 - 45.3 %    Monocyte % 8.0 5.0 - 12.0 %    Eosinophil % 4.2 0.3 - 6.2 %    Basophil % 0.5 0.0 - 1.5 %    Immature Grans % 0.7 (H) 0.0 - 0.5 %    Neutrophils, Absolute 6.13 1.70 - 7.00 10*3/mm3    Lymphocytes, Absolute 1.00 0.70 - 3.10 10*3/mm3    Monocytes, Absolute 0.66 0.10 - 0.90 10*3/mm3    Eosinophils, Absolute 0.35 0.00 - 0.40 10*3/mm3    Basophils, Absolute 0.04 0.00 - 0.20 10*3/mm3    Immature Grans, Absolute 0.06 (H) 0.00 - 0.05 10*3/mm3    nRBC 0.0 0.0 - 0.2 /100 WBC   Vitamin D 25 Hydroxy    Specimen: Blood   Result Value Ref Range    25 Hydroxy, Vitamin D 24.7 (L) 30.0 - 100.0 ng/ml   TSH    Specimen: Blood   Result Value Ref Range    TSH 0.167 (L) 0.270 - 4.200 uIU/mL   Comprehensive Metabolic Panel    Specimen: Blood   Result Value Ref Range    Glucose 89 65 - 99 mg/dL    BUN 10 6 - 20 mg/dL    Creatinine 0.96 0.57 - 1.00 mg/dL    Sodium 141 136 - 145 mmol/L    Potassium 4.6 3.5 - 5.2 mmol/L    Chloride 105 98 - 107 mmol/L    CO2 28.0 22.0 - 29.0 mmol/L    Calcium 10.3 8.6 - 10.5 mg/dL    Total Protein 7.3 6.0 - 8.5 g/dL    Albumin 4.40 3.50 - 5.20 g/dL    ALT (SGPT) 22 1 - 33 U/L    AST (SGOT) 27 1 - 32 U/L    Alkaline Phosphatase 89 39 - 117 U/L    Total Bilirubin 0.2 0.0 - 1.2 mg/dL    Globulin 2.9 gm/dL    A/G Ratio 1.5 g/dL    BUN/Creatinine Ratio 10.4 7.0 - 25.0    Anion Gap 8.0 5.0 - 15.0 mmol/L    eGFR 82.8 >60.0 mL/min/1.73     *Note: Due to a large number of results and/or encounters for the requested time period, some results have not been displayed. A complete set of results can be found in Results Review.

## 2023-04-14 ENCOUNTER — LAB (OUTPATIENT)
Dept: LAB | Facility: OTHER | Age: 28
End: 2023-04-14
Payer: COMMERCIAL

## 2023-04-14 PROCEDURE — 84443 ASSAY THYROID STIM HORMONE: CPT | Performed by: NURSE PRACTITIONER

## 2023-04-15 LAB — TSH SERPL DL<=0.05 MIU/L-ACNC: 0.13 UIU/ML (ref 0.27–4.2)

## 2023-04-17 ENCOUNTER — TELEPHONE (OUTPATIENT)
Dept: ENDOCRINOLOGY | Facility: CLINIC | Age: 28
End: 2023-04-17
Payer: COMMERCIAL

## 2023-04-17 NOTE — TELEPHONE ENCOUNTER
----- Message from MILLY Alcala sent at 4/17/2023  8:15 AM CDT -----  Thyroid mildly hyper, keep same dosage but every Sunday only take 1/2 tablet   Well-Child Checkup: 6 to 8 Years     Struggles in school can indicate problems with a child’s health or development. If your child is having trouble in school, talk to the child’s healthcare provider.    Even if your child is healthy, keep bringing him o Teaching your child healthy eating and lifestyle habits can lead to a lifetime of good health. To help, set a good example with your words and actions. Remember, good habits formed now will stay with your child forever.  Here are some tips:  · Help your chi Now that your child is in school, a good night’s sleep is even more important. At this age, your child needs about 10 hours of sleep each night. Here are some tips:  · Set a bedtime and make sure your child follows it each night.   · TV, computer, and video Bedwetting, or urinating when sleeping, can be frustrating for both you and your child. But it’s usually not a sign of a major problem. Your child’s body may simply need more time to mature.  If a child suddenly starts wetting the bed, the cause is often a

## 2023-05-22 ENCOUNTER — OFFICE VISIT (OUTPATIENT)
Dept: FAMILY MEDICINE CLINIC | Facility: CLINIC | Age: 28
End: 2023-05-22
Payer: COMMERCIAL

## 2023-05-22 ENCOUNTER — LAB (OUTPATIENT)
Dept: LAB | Facility: HOSPITAL | Age: 28
End: 2023-05-22
Payer: COMMERCIAL

## 2023-05-22 VITALS
DIASTOLIC BLOOD PRESSURE: 70 MMHG | OXYGEN SATURATION: 100 % | HEIGHT: 67 IN | HEART RATE: 116 BPM | SYSTOLIC BLOOD PRESSURE: 120 MMHG | WEIGHT: 129 LBS | BODY MASS INDEX: 20.25 KG/M2

## 2023-05-22 DIAGNOSIS — E03.9 HYPOTHYROIDISM (ACQUIRED): Primary | ICD-10-CM

## 2023-05-22 DIAGNOSIS — F51.01 PRIMARY INSOMNIA: ICD-10-CM

## 2023-05-22 PROCEDURE — 84480 ASSAY TRIIODOTHYRONINE (T3): CPT | Performed by: NURSE PRACTITIONER

## 2023-05-22 PROCEDURE — 84439 ASSAY OF FREE THYROXINE: CPT | Performed by: NURSE PRACTITIONER

## 2023-05-22 PROCEDURE — 36415 COLL VENOUS BLD VENIPUNCTURE: CPT | Performed by: NURSE PRACTITIONER

## 2023-05-22 PROCEDURE — 80050 GENERAL HEALTH PANEL: CPT | Performed by: NURSE PRACTITIONER

## 2023-05-22 PROCEDURE — 99214 OFFICE O/P EST MOD 30 MIN: CPT | Performed by: NURSE PRACTITIONER

## 2023-05-22 NOTE — PROGRESS NOTES
Chief Complaint   Patient presents with   • Hypothyroidism     3 month check up     Subjective   Lala Padilla is a 28 y.o. female.           Hypothyroidism  This is a chronic problem. The current episode started more than 1 year ago. The problem has been waxing and waning. Associated symptoms include fatigue. Pertinent negatives include no abdominal pain, arthralgias, chest pain, chills, congestion, coughing, diaphoresis, fever, headaches, joint swelling, myalgias, nausea, neck pain, numbness, sore throat, swollen glands, urinary symptoms, visual change, vomiting or weakness. The symptoms are aggravated by stress. She has tried rest (synthroid ) for the symptoms. The treatment provided mild relief.   Insomnia  This is a recurrent problem. The current episode started 1 to 4 weeks ago. The problem occurs every several days. The problem has been waxing and waning. Associated symptoms include fatigue. Pertinent negatives include no abdominal pain, arthralgias, chest pain, chills, congestion, coughing, diaphoresis, fever, headaches, joint swelling, myalgias, nausea, neck pain, numbness, sore throat, swollen glands, urinary symptoms, visual change, vomiting or weakness. The treatment provided mild relief.        The following portions of the patient's history were reviewed and updated as appropriate: allergies, current medications, past social history and problem list.    Review of Systems   Constitutional: Positive for activity change and fatigue. Negative for appetite change, chills, diaphoresis, fever and unexpected weight change.   HENT: Negative.  Negative for congestion, dental problem, drooling, ear discharge, ear pain, facial swelling, hearing loss, mouth sores, nosebleeds, postnasal drip, rhinorrhea, sinus pressure, sinus pain, sneezing, sore throat, tinnitus, trouble swallowing and voice change.             Eyes: Negative.  Negative for photophobia, pain, discharge, redness, itching and visual  "disturbance.   Respiratory: Negative.  Negative for apnea, cough, choking, chest tightness, shortness of breath, wheezing and stridor.    Cardiovascular: Negative.  Negative for chest pain, palpitations and leg swelling.   Gastrointestinal: Negative.  Negative for abdominal distention, abdominal pain, anal bleeding, blood in stool, constipation, diarrhea, nausea, rectal pain and vomiting.   Endocrine: Negative.  Negative for polydipsia, polyphagia and polyuria.   Genitourinary: Negative.  Negative for difficulty urinating, dyspareunia, dysuria, enuresis and genital sores.   Musculoskeletal: Negative for arthralgias, back pain, gait problem, joint swelling, myalgias, neck pain and neck stiffness.   Skin: Negative for color change, pallor and wound.   Allergic/Immunologic: Negative.  Negative for environmental allergies, food allergies and immunocompromised state.   Neurological: Negative.  Negative for dizziness, tremors, seizures, syncope, facial asymmetry, speech difficulty, weakness, light-headedness, numbness and headaches.   Hematological: Negative.  Negative for adenopathy. Does not bruise/bleed easily.   Psychiatric/Behavioral: Positive for sleep disturbance. Negative for agitation, behavioral problems, confusion, decreased concentration, dysphoric mood, hallucinations, self-injury and suicidal ideas. The patient has insomnia. The patient is not nervous/anxious and is not hyperactive.         Currently treating for add   Bipolar        Objective   /70   Pulse 116   Ht 170.2 cm (67\")   Wt 58.5 kg (129 lb)   SpO2 100%   BMI 20.20 kg/m²   Physical Exam  Vitals and nursing note reviewed.   Constitutional:       General: She is not in acute distress.     Appearance: Normal appearance. She is not ill-appearing or diaphoretic.   HENT:      Head: Normocephalic and atraumatic.      Right Ear: Tympanic membrane normal. There is no impacted cerumen.      Left Ear: Tympanic membrane normal. There is no impacted " cerumen.      Nose: Nose normal. No congestion or rhinorrhea.      Mouth/Throat:      Mouth: Mucous membranes are moist.      Pharynx: No oropharyngeal exudate or posterior oropharyngeal erythema.   Eyes:      General: No scleral icterus.        Right eye: No discharge.         Left eye: No discharge.      Extraocular Movements: Extraocular movements intact.      Pupils: Pupils are equal, round, and reactive to light.   Neck:      Vascular: No carotid bruit.   Cardiovascular:      Rate and Rhythm: Normal rate and regular rhythm.      Pulses: Normal pulses.      Heart sounds: No murmur heard.    No friction rub. No gallop.   Pulmonary:      Effort: Pulmonary effort is normal. No respiratory distress.      Breath sounds: No stridor. No wheezing, rhonchi or rales.   Chest:      Chest wall: No tenderness.   Abdominal:      General: Abdomen is flat. There is no distension.      Palpations: There is no mass.      Tenderness: There is no abdominal tenderness. There is no right CVA tenderness, left CVA tenderness, guarding or rebound.      Hernia: No hernia is present.   Musculoskeletal:         General: No swelling, tenderness, deformity or signs of injury. Normal range of motion.      Cervical back: Normal range of motion. No rigidity or tenderness.      Right lower leg: No edema.      Left lower leg: No edema.   Lymphadenopathy:      Cervical: No cervical adenopathy.   Skin:     General: Skin is warm.      Coloration: Skin is not jaundiced or pale.      Findings: No bruising, erythema, lesion or rash.      Comments:      Neurological:      General: No focal deficit present.      Mental Status: She is alert and oriented to person, place, and time.      Cranial Nerves: No cranial nerve deficit.      Sensory: No sensory deficit.      Motor: No weakness.      Coordination: Coordination normal.      Gait: Gait normal.      Deep Tendon Reflexes: Reflexes normal.   Psychiatric:         Mood and Affect: Mood normal.          Behavior: Behavior normal.              Assessment & Plan     Problems Addressed this Visit        Sleep    Primary insomnia   Other Visit Diagnoses     Hypothyroidism (acquired)    -  Primary    Relevant Orders    TSH    T3    T4, Free    CBC & Differential    Comprehensive Metabolic Panel      Diagnoses       Codes Comments    Hypothyroidism (acquired)    -  Primary ICD-10-CM: E03.9  ICD-9-CM: 244.9     Primary insomnia     ICD-10-CM: F51.01  ICD-9-CM: 307.42          No orders of the defined types were placed in this encounter.    Current Outpatient Medications on File Prior to Visit   Medication Sig Dispense Refill   • EPINEPHrine (EPIPEN) 0.3 MG/0.3ML solution auto-injector injection INJECT CONTENTS OF 1 PEN AS NEEDED FOR ALLERGIC REACTION     • hydrOXYzine (ATARAX) 10 MG tablet 1-2 at night 60 tablet 11   • lamoTRIgine (LaMICtal) 100 MG tablet Take 1 tablet by mouth 2 (Two) Times a Day. 1/2 tab twice daily     • levothyroxine (Synthroid) 125 MCG tablet Take 1 tablet by mouth Daily. (Patient taking differently: Take 1 tablet by mouth Daily. Except 1/2 tablet on Sun) 90 tablet 3   • QUEtiapine (SEROquel) 50 MG tablet Take 1 tablet by mouth Daily.     • Vyvanse 20 MG capsule TAKE 1 CAPSULE BY MOUTH ONCE DAILY IN THE MORNING AS DIRECTED       No current facility-administered medications on file prior to visit.       20 minutes   Follow Up   Return in about 3 months (around 8/22/2023) for Next scheduled follow up.      It's not just what you eat, but when you eat  Eat breakfast, and eat smaller meals throughout the day. A healthy breakfast can jumpstart your metabolism, while eating small, healthy meals (rather than the standard three large meals) keeps your energy up.   Avoid eating at night. Try to eat dinner earlier and fast for 14-16 hours until breakfast the next morning. Studies suggest that eating only when you’re most active and giving your digestive system a long break each day may help to regulate weight.        Labs today   Monitor heart rate, taking vyvanse bid-instructed to hold nighttime dosage due to elevated heart rate and concern for tachycardia -also concern for over stimulated thyroid  Will notify of results when available.   Obtain pulse ox as directed

## 2023-05-23 ENCOUNTER — TELEPHONE (OUTPATIENT)
Dept: FAMILY MEDICINE CLINIC | Facility: CLINIC | Age: 28
End: 2023-05-23
Payer: COMMERCIAL

## 2023-05-23 LAB
ALBUMIN SERPL-MCNC: 4.7 G/DL (ref 3.5–5.2)
ALBUMIN/GLOB SERPL: 1.8 G/DL
ALP SERPL-CCNC: 89 U/L (ref 39–117)
ALT SERPL W P-5'-P-CCNC: 10 U/L (ref 1–33)
ANION GAP SERPL CALCULATED.3IONS-SCNC: 15 MMOL/L (ref 5–15)
AST SERPL-CCNC: 17 U/L (ref 1–32)
BASOPHILS # BLD AUTO: 0.04 10*3/MM3 (ref 0–0.2)
BASOPHILS NFR BLD AUTO: 0.4 % (ref 0–1.5)
BILIRUB SERPL-MCNC: 0.3 MG/DL (ref 0–1.2)
BUN SERPL-MCNC: 10 MG/DL (ref 6–20)
BUN/CREAT SERPL: 10.5 (ref 7–25)
CALCIUM SPEC-SCNC: 9.8 MG/DL (ref 8.6–10.5)
CHLORIDE SERPL-SCNC: 100 MMOL/L (ref 98–107)
CO2 SERPL-SCNC: 23 MMOL/L (ref 22–29)
CREAT SERPL-MCNC: 0.95 MG/DL (ref 0.57–1)
DEPRECATED RDW RBC AUTO: 40.2 FL (ref 37–54)
EGFRCR SERPLBLD CKD-EPI 2021: 83.3 ML/MIN/1.73
EOSINOPHIL # BLD AUTO: 0.12 10*3/MM3 (ref 0–0.4)
EOSINOPHIL NFR BLD AUTO: 1.3 % (ref 0.3–6.2)
ERYTHROCYTE [DISTWIDTH] IN BLOOD BY AUTOMATED COUNT: 13.4 % (ref 12.3–15.4)
GLOBULIN UR ELPH-MCNC: 2.6 GM/DL
GLUCOSE SERPL-MCNC: 88 MG/DL (ref 65–99)
HCT VFR BLD AUTO: 43.6 % (ref 34–46.6)
HGB BLD-MCNC: 14.7 G/DL (ref 12–15.9)
IMM GRANULOCYTES # BLD AUTO: 0.02 10*3/MM3 (ref 0–0.05)
IMM GRANULOCYTES NFR BLD AUTO: 0.2 % (ref 0–0.5)
LYMPHOCYTES # BLD AUTO: 1.37 10*3/MM3 (ref 0.7–3.1)
LYMPHOCYTES NFR BLD AUTO: 14.3 % (ref 19.6–45.3)
MCH RBC QN AUTO: 27.8 PG (ref 26.6–33)
MCHC RBC AUTO-ENTMCNC: 33.7 G/DL (ref 31.5–35.7)
MCV RBC AUTO: 82.6 FL (ref 79–97)
MONOCYTES # BLD AUTO: 0.68 10*3/MM3 (ref 0.1–0.9)
MONOCYTES NFR BLD AUTO: 7.1 % (ref 5–12)
NEUTROPHILS NFR BLD AUTO: 7.37 10*3/MM3 (ref 1.7–7)
NEUTROPHILS NFR BLD AUTO: 76.7 % (ref 42.7–76)
NRBC BLD AUTO-RTO: 0 /100 WBC (ref 0–0.2)
PLATELET # BLD AUTO: 332 10*3/MM3 (ref 140–450)
PMV BLD AUTO: 9.6 FL (ref 6–12)
POTASSIUM SERPL-SCNC: 4.3 MMOL/L (ref 3.5–5.2)
PROT SERPL-MCNC: 7.3 G/DL (ref 6–8.5)
RBC # BLD AUTO: 5.28 10*6/MM3 (ref 3.77–5.28)
SODIUM SERPL-SCNC: 138 MMOL/L (ref 136–145)
T3 SERPL-MCNC: 102 NG/DL (ref 80–200)
T4 FREE SERPL-MCNC: 1.57 NG/DL (ref 0.93–1.7)
TSH SERPL DL<=0.05 MIU/L-ACNC: 0.13 UIU/ML (ref 0.27–4.2)
WBC NRBC COR # BLD: 9.6 10*3/MM3 (ref 3.4–10.8)

## 2023-05-23 RX ORDER — LEVOTHYROXINE SODIUM 0.1 MG/1
100 TABLET ORAL DAILY
Qty: 90 TABLET | Refills: 3 | Status: SHIPPED | OUTPATIENT
Start: 2023-05-23

## 2023-05-23 NOTE — PROGRESS NOTES
Per MILLY Guevara, Ms.Brothivan has been called with recent lab results & recommendations.  Continue current medications and follow-up as planned or sooner if any problems.   New Rx sent

## 2023-05-23 NOTE — TELEPHONE ENCOUNTER
Per IMLLY Guevara, Ms.Brothivan has been called with recent lab results & recommendations.  Continue current medications and follow-up as planned or sooner if any problems.   New Rx sent

## 2023-05-23 NOTE — TELEPHONE ENCOUNTER
----- Message from MILLY Keen sent at 5/23/2023  2:16 PM CDT -----  Regarding: FW:  Can you Izaiah her know she needs to decrease her thyroid dosage to 100 mcg will need a new rx  ----- Message -----  From: Lab, Background User  Sent: 5/23/2023   1:12 AM CDT  To: MILLY Keen

## 2023-07-25 RX ORDER — LEVOTHYROXINE SODIUM 0.1 MG/1
100 TABLET ORAL DAILY
Qty: 90 TABLET | Refills: 3 | Status: SHIPPED | OUTPATIENT
Start: 2023-07-25

## 2023-07-25 NOTE — TELEPHONE ENCOUNTER
UPCOMING APPTS  With Family Medicine (MILLY Keen)  08/23/2023 at 3:45 PM  LAST OFFICE VISIT - THIS DEPT  5/22/2023 Aarti Obando, MILLY Lindquist

## 2023-08-23 ENCOUNTER — OFFICE VISIT (OUTPATIENT)
Dept: FAMILY MEDICINE CLINIC | Facility: CLINIC | Age: 28
End: 2023-08-23
Payer: COMMERCIAL

## 2023-08-23 ENCOUNTER — LAB (OUTPATIENT)
Dept: LAB | Facility: HOSPITAL | Age: 28
End: 2023-08-23
Payer: COMMERCIAL

## 2023-08-23 VITALS
HEART RATE: 99 BPM | WEIGHT: 130.8 LBS | DIASTOLIC BLOOD PRESSURE: 88 MMHG | SYSTOLIC BLOOD PRESSURE: 98 MMHG | HEIGHT: 67 IN | RESPIRATION RATE: 16 BRPM | OXYGEN SATURATION: 100 % | BODY MASS INDEX: 20.53 KG/M2

## 2023-08-23 DIAGNOSIS — L98.9 SKIN LESION OF SCALP: ICD-10-CM

## 2023-08-23 DIAGNOSIS — E03.9 HYPOTHYROIDISM, UNSPECIFIED TYPE: ICD-10-CM

## 2023-08-23 DIAGNOSIS — F51.01 PRIMARY INSOMNIA: Primary | ICD-10-CM

## 2023-08-23 PROCEDURE — 36415 COLL VENOUS BLD VENIPUNCTURE: CPT | Performed by: NURSE PRACTITIONER

## 2023-08-23 PROCEDURE — 84443 ASSAY THYROID STIM HORMONE: CPT | Performed by: NURSE PRACTITIONER

## 2023-08-23 PROCEDURE — 99214 OFFICE O/P EST MOD 30 MIN: CPT | Performed by: NURSE PRACTITIONER

## 2023-08-23 NOTE — PROGRESS NOTES
Chief Complaint   Patient presents with    Follow-up    Hypothyroidism    Insomnia            Subjective   Lala Padilla is a 28 y.o. female.           Hypothyroidism  This is a chronic problem. The current episode started more than 1 year ago. The problem occurs daily. The problem has been resolved. Associated symptoms include fatigue. Pertinent negatives include no abdominal pain, arthralgias, chest pain, chills, congestion, coughing, diaphoresis, fever, headaches, joint swelling, myalgias, nausea, neck pain, numbness, rash, sore throat, vomiting or weakness. Treatments tried: synthroid. The treatment provided significant relief.   Insomnia  This is a recurrent problem. The current episode started 1 to 4 weeks ago. The problem occurs every several days. The problem has been waxing and waning. Associated symptoms include fatigue. Pertinent negatives include no abdominal pain, arthralgias, chest pain, chills, congestion, coughing, diaphoresis, fever, headaches, joint swelling, myalgias, nausea, neck pain, numbness, rash, sore throat, vomiting or weakness. The treatment provided mild relief.   Rash  Associated symptoms include fatigue. Pertinent negatives include no congestion, cough, diarrhea, eye pain, fever, rhinorrhea, shortness of breath, sore throat or vomiting.      The following portions of the patient's history were reviewed and updated as appropriate: allergies, current medications, past social history and problem list.    Review of Systems   Constitutional:  Positive for activity change and fatigue. Negative for appetite change, chills, diaphoresis, fever and unexpected weight change.   HENT: Negative.  Negative for congestion, dental problem, drooling, ear discharge, ear pain, facial swelling, hearing loss, mouth sores, nosebleeds, postnasal drip, rhinorrhea, sinus pressure, sinus pain, sneezing, sore throat, tinnitus, trouble swallowing and voice change.             Eyes: Negative.   "Negative for photophobia, pain, discharge, redness, itching and visual disturbance.   Respiratory: Negative.  Negative for apnea, cough, choking, chest tightness, shortness of breath, wheezing and stridor.    Cardiovascular: Negative.  Negative for chest pain, palpitations and leg swelling.   Gastrointestinal: Negative.  Negative for abdominal distention, abdominal pain, anal bleeding, blood in stool, constipation, diarrhea, nausea, rectal pain and vomiting.   Endocrine: Negative.  Negative for polydipsia, polyphagia and polyuria.   Genitourinary: Negative.  Negative for difficulty urinating, dyspareunia, dysuria, enuresis and genital sores.   Musculoskeletal:  Negative for arthralgias, back pain, gait problem, joint swelling, myalgias, neck pain and neck stiffness.   Skin:  Negative for color change, pallor, rash and wound.        Skin lesion scalp-growing and changing    Allergic/Immunologic: Negative.  Negative for environmental allergies, food allergies and immunocompromised state.   Neurological: Negative.  Negative for dizziness, tremors, seizures, syncope, facial asymmetry, speech difficulty, weakness, light-headedness, numbness and headaches.   Hematological: Negative.  Negative for adenopathy. Does not bruise/bleed easily.   Psychiatric/Behavioral:  Positive for sleep disturbance. Negative for agitation, behavioral problems, confusion, decreased concentration, dysphoric mood, hallucinations, self-injury and suicidal ideas. The patient has insomnia. The patient is not nervous/anxious and is not hyperactive.      Objective   BP 98/88 (BP Location: Left arm, Patient Position: Sitting, Cuff Size: Adult)   Pulse 99   Resp 16   Ht 170.2 cm (67\")   Wt 59.3 kg (130 lb 12.8 oz)   LMP 08/16/2023 (Exact Date)   SpO2 100%   BMI 20.49 kg/mý   Physical Exam  Vitals and nursing note reviewed.   Constitutional:       General: She is not in acute distress.     Appearance: Normal appearance. She is not ill-appearing " or diaphoretic.   HENT:      Head: Normocephalic and atraumatic.      Right Ear: Tympanic membrane normal. There is no impacted cerumen.      Left Ear: Tympanic membrane normal. There is no impacted cerumen.      Nose: Nose normal. No congestion or rhinorrhea.      Mouth/Throat:      Mouth: Mucous membranes are moist.      Pharynx: No oropharyngeal exudate or posterior oropharyngeal erythema.   Eyes:      General: No scleral icterus.        Right eye: No discharge.         Left eye: No discharge.      Extraocular Movements: Extraocular movements intact.      Pupils: Pupils are equal, round, and reactive to light.   Neck:      Vascular: No carotid bruit.   Cardiovascular:      Rate and Rhythm: Normal rate and regular rhythm.      Pulses: Normal pulses.      Heart sounds: No murmur heard.    No friction rub. No gallop.   Pulmonary:      Effort: Pulmonary effort is normal. No respiratory distress.      Breath sounds: No stridor. No wheezing, rhonchi or rales.   Chest:      Chest wall: No tenderness.   Abdominal:      General: Abdomen is flat. There is no distension.      Palpations: There is no mass.      Tenderness: There is no abdominal tenderness. There is no right CVA tenderness, left CVA tenderness, guarding or rebound.      Hernia: No hernia is present.   Musculoskeletal:         General: No swelling, tenderness, deformity or signs of injury. Normal range of motion.      Cervical back: Normal range of motion. No rigidity or tenderness.      Right lower leg: No edema.      Left lower leg: No edema.   Lymphadenopathy:      Cervical: No cervical adenopathy.   Skin:     General: Skin is warm.      Coloration: Skin is not jaundiced or pale.      Findings: Lesion present. No bruising, erythema or rash.      Comments: Skin lesion scalp-growing and changing right lateral scalp      Neurological:      General: No focal deficit present.      Mental Status: She is alert and oriented to person, place, and time.      Cranial  Nerves: No cranial nerve deficit.      Sensory: No sensory deficit.      Motor: No weakness.      Coordination: Coordination normal.      Gait: Gait normal.      Deep Tendon Reflexes: Reflexes normal.   Psychiatric:         Mood and Affect: Mood normal.         Behavior: Behavior normal.            Assessment & Plan     Problems Addressed this Visit          Endocrine and Metabolic    Hypothyroidism       Sleep    Primary insomnia - Primary    Relevant Orders    TSH     Other Visit Diagnoses       Skin lesion of scalp        Relevant Orders    Ambulatory Referral to Dermatology          Diagnoses         Codes Comments    Primary insomnia    -  Primary ICD-10-CM: F51.01  ICD-9-CM: 307.42     Hypothyroidism, unspecified type     ICD-10-CM: E03.9  ICD-9-CM: 244.9     Skin lesion of scalp     ICD-10-CM: L98.9  ICD-9-CM: 709.9            No orders of the defined types were placed in this encounter.    Current Outpatient Medications on File Prior to Visit   Medication Sig Dispense Refill    EPINEPHrine (EPIPEN) 0.3 MG/0.3ML solution auto-injector injection INJECT CONTENTS OF 1 PEN AS NEEDED FOR ALLERGIC REACTION      hydrOXYzine (ATARAX) 10 MG tablet 1-2 at night 60 tablet 11    lamoTRIgine (LaMICtal) 100 MG tablet Take 1 tablet by mouth 2 (Two) Times a Day. 1/2 tab twice daily      levothyroxine (Synthroid) 100 MCG tablet Take 1 tablet by mouth Daily. 90 tablet 3    QUEtiapine (SEROquel) 50 MG tablet Take 1 tablet by mouth Daily.      Vyvanse 20 MG capsule TAKE 1 CAPSULE BY MOUTH ONCE DAILY IN THE MORNING AS DIRECTED       No current facility-administered medications on file prior to visit.       20 minutes  Follow Up   No follow-ups on file.    Referral to dermatology as directed-skin lesion on scalp  Labs for thyroid  Continue other meds as directed  Follow up if worsen

## 2023-08-24 ENCOUNTER — TELEPHONE (OUTPATIENT)
Dept: FAMILY MEDICINE CLINIC | Facility: CLINIC | Age: 28
End: 2023-08-24
Payer: COMMERCIAL

## 2023-08-24 LAB — TSH SERPL DL<=0.05 MIU/L-ACNC: 1.51 UIU/ML (ref 0.27–4.2)

## 2023-08-24 NOTE — TELEPHONE ENCOUNTER
-Per MILLY Guevara, Ms. Brothers has been called with recent lab results & recommendations.  Continue current medications and follow-up as planned or sooner if any problems.     ---- Message from MILLY Keen sent at 8/24/2023  7:30 AM CDT -----  Can you let her know labs are perfect, first time in a while-continue dosage she is taking. Good news

## 2023-09-27 ENCOUNTER — TELEPHONE (OUTPATIENT)
Dept: ENDOCRINOLOGY | Facility: CLINIC | Age: 28
End: 2023-09-27
Payer: COMMERCIAL

## (undated) DEVICE — BITEBLOCK ENDO W/STRAP 60F A/ LF DISP

## (undated) DEVICE — SINGLE-USE BIOPSY FORCEPS: Brand: RADIAL JAW 4